# Patient Record
Sex: MALE | Race: BLACK OR AFRICAN AMERICAN | Employment: UNEMPLOYED | URBAN - METROPOLITAN AREA
[De-identification: names, ages, dates, MRNs, and addresses within clinical notes are randomized per-mention and may not be internally consistent; named-entity substitution may affect disease eponyms.]

---

## 2017-11-17 ENCOUNTER — HOSPITAL ENCOUNTER (OUTPATIENT)
Dept: GENERAL RADIOLOGY | Age: 27
Discharge: HOME | End: 2017-11-17
Payer: MEDICARE

## 2017-11-17 ENCOUNTER — HOSPITAL ENCOUNTER (OUTPATIENT)
Dept: PREADMISSION TESTING | Age: 27
Discharge: HOME | End: 2017-11-17
Payer: MEDICARE

## 2017-11-17 VITALS
WEIGHT: 158 LBS | HEIGHT: 68 IN | OXYGEN SATURATION: 98 % | BODY MASS INDEX: 23.95 KG/M2 | SYSTOLIC BLOOD PRESSURE: 146 MMHG | TEMPERATURE: 97.4 F | DIASTOLIC BLOOD PRESSURE: 73 MMHG | RESPIRATION RATE: 16 BRPM | HEART RATE: 58 BPM

## 2017-11-17 DIAGNOSIS — R52 PAIN: ICD-10-CM

## 2017-11-17 LAB
ABSOLUTE EOS #: 0.4 K/UL (ref 0–0.4)
ABSOLUTE IMMATURE GRANULOCYTE: NORMAL K/UL (ref 0–0.3)
ABSOLUTE LYMPH #: 2 K/UL (ref 1–4.8)
ABSOLUTE MONO #: 0.7 K/UL (ref 0.1–1.3)
ANION GAP SERPL CALCULATED.3IONS-SCNC: 10 MMOL/L (ref 9–17)
BASOPHILS # BLD: 1 %
BASOPHILS ABSOLUTE: 0 K/UL (ref 0–0.2)
BUN BLDV-MCNC: 10 MG/DL (ref 6–20)
BUN/CREAT BLD: NORMAL (ref 9–20)
CALCIUM SERPL-MCNC: 9.1 MG/DL (ref 8.6–10.4)
CHLORIDE BLD-SCNC: 106 MMOL/L (ref 98–107)
CO2: 26 MMOL/L (ref 20–31)
CREAT SERPL-MCNC: 0.86 MG/DL (ref 0.7–1.2)
DIFFERENTIAL TYPE: NORMAL
EOSINOPHILS RELATIVE PERCENT: 6 %
GFR AFRICAN AMERICAN: >60 ML/MIN
GFR NON-AFRICAN AMERICAN: >60 ML/MIN
GFR SERPL CREATININE-BSD FRML MDRD: NORMAL ML/MIN/{1.73_M2}
GFR SERPL CREATININE-BSD FRML MDRD: NORMAL ML/MIN/{1.73_M2}
GLUCOSE BLD-MCNC: 86 MG/DL (ref 70–99)
HCT VFR BLD CALC: 45.4 % (ref 41–53)
HEMOGLOBIN: 15.6 G/DL (ref 13.5–17.5)
IMMATURE GRANULOCYTES: NORMAL %
LYMPHOCYTES # BLD: 34 %
MCH RBC QN AUTO: 33.1 PG (ref 26–34)
MCHC RBC AUTO-ENTMCNC: 34.3 G/DL (ref 31–37)
MCV RBC AUTO: 96.7 FL (ref 80–100)
MONOCYTES # BLD: 12 %
PDW BLD-RTO: 13.5 % (ref 11.5–14.9)
PLATELET # BLD: 186 K/UL (ref 150–450)
PLATELET ESTIMATE: NORMAL
PMV BLD AUTO: 10.5 FL (ref 6–12)
POTASSIUM SERPL-SCNC: 4.4 MMOL/L (ref 3.7–5.3)
RBC # BLD: 4.7 M/UL (ref 4.5–5.9)
RBC # BLD: NORMAL 10*6/UL
SEG NEUTROPHILS: 47 %
SEGMENTED NEUTROPHILS ABSOLUTE COUNT: 2.9 K/UL (ref 1.3–9.1)
SODIUM BLD-SCNC: 142 MMOL/L (ref 135–144)
WBC # BLD: 6 K/UL (ref 3.5–11)
WBC # BLD: NORMAL 10*3/UL

## 2017-11-17 PROCEDURE — 36415 COLL VENOUS BLD VENIPUNCTURE: CPT

## 2017-11-17 PROCEDURE — 85025 COMPLETE CBC W/AUTO DIFF WBC: CPT

## 2017-11-17 PROCEDURE — 73620 X-RAY EXAM OF FOOT: CPT

## 2017-11-17 PROCEDURE — 80048 BASIC METABOLIC PNL TOTAL CA: CPT

## 2017-11-17 ASSESSMENT — PAIN DESCRIPTION - LOCATION: LOCATION: FOOT

## 2017-11-17 ASSESSMENT — PAIN DESCRIPTION - ORIENTATION: ORIENTATION: RIGHT

## 2017-11-17 ASSESSMENT — PAIN DESCRIPTION - PAIN TYPE: TYPE: CHRONIC PAIN

## 2017-11-17 NOTE — H&P
HISTORY and [facility]       NAME:  Clemente Morales  MRN: [id number]   YOB: 1990   Date: 11/17/2017   Age: 32 y.o. Gender: male       COMPLAINT AND PRESENT HISTORY:     Clemente Morales is 32 y.o.,  male,undergoing preadmission testing for right Hallux Valgus. Will be undergoing surgery for Right Bunionectomy. The symptoms started 2 years ago. Hx of fx of great toe as a child. Pt C/O of pain, deformity, limitation in the range of motion. Was incarcerated for 6 yrs and wore inadequate shoes and on cement deepti primarily. Complains of irritation of socks to his right foot, pain and numbness to his middle toe. Pt denies any other symptoms. Had a recent wart removed. No redness, swelling or rashes. No recent falls or trauma. PAST MEDICAL HISTORY     Past Medical History:   Diagnosis Date    Diarrhea     Eczema     Poor historian        Pt denies any history of Diabetes mellitus type 2, hypertension, stroke, heart disease, COPD, Asthma, GERD, HLD, Cancer, Seizures,Thyroid disease, Kidney Disease, Hepatitis, TB, Psychiatric Disorders or Substance abuse. SURGICAL HISTORY       Past Surgical History:   Procedure Laterality Date    EYE SURGERY Right     removed part of foreign body       FAMILY HISTORY     History reviewed. No pertinent family history.     SOCIAL HISTORY       Social History     Social History    Marital status: Single     Spouse name: N/A    Number of children: N/A    Years of education: N/A     Social History Main Topics    Smoking status: Current Every Day Smoker     Packs/day: 0.20     Years: 4.00     Types: Cigarettes     Start date: 11/17/2012    Smokeless tobacco: Never Used      Comment: 3-4 cig per day    Alcohol use No    Drug use: No    Sexual activity: Not Asked     Other Topics Concern    None     Social History Narrative    None        REVIEW OF SYSTEMS      No Known Allergies    Current Outpatient Prescriptions on File Prior to Encounter   Medication Sig Dispense Refill    ibuprofen (ADVIL;MOTRIN) 800 MG tablet Take 1 tablet by mouth every 8 hours as needed for Pain 20 tablet 0     No current facility-administered medications on file prior to encounter. General health:  Fairly good. No fever or chills. Skin:  No itching, redness or rash. HEENT:  No headache, epistaxis or sore throat. Visual deficit in right eye              Neck:  No pain, stiffness or masses. Cardiovascular/Respiratory system:  No chest pain, palpitation or shortness of breath. Gastrointestinal tract: No abdominal pain, nausea, vomiting, diarrhea or constipation. Genitourinary:  No burning on micturition. No hesitancy, urgency, frequency or discoloration of urine. Locomotor:  See HPI. Neuropsychiatric:  No referable complaints. GENERAL PHYSICAL EXAM:     Vitals: BP (!) 146/73   Pulse 58   Temp 97.4 °F (36.3 °C)   Resp 16   Ht 5' 8\" (1.727 m)   Wt 158 lb (71.7 kg)   SpO2 98%   BMI 24.02 kg/m²  Body mass index is 24.02 kg/m². GENERAL APPEARANCE:   Roddy Norris is 32 y.o.,  male, not obese, nourished, conscious, alert. Does not appear to be distress or pain at this time. SKIN:  Warm, dry, no cyanosis or jaundice. HEAD:  Normocephalic, atraumatic, no swelling or tenderness. EYES:  Pupils non-equal , reactive to light. Cross -eyed in right. EARS:  No discharge, no marked hearing loss. NOSE:  No rhinorrhea, epistaxis or septal deformity. THROAT:  Not congested. No ulceration bleeding or discharge. NECK:  No stiffness, trachea central.  No palpable masses or L.N.                 CHEST:  Symmetrical and equal on expansion. HEART:  RRR S1 > S2.  No audible murmurs or gallops. LUNGS:  Equal on expansion, normal breath sounds. No adventitious sounds. ABDOMEN:  Obese. Soft on palpation. No localized tenderness. No guarding or rigidity. No palpable organomegaly. LYMPHATICS:  No palpable cervical lymphadenopathy. LOCOMOTOR, BACK AND SPINE:  No tenderness or deformities. Has a limp. Uses a cane to help with ambulation. EXTREMITIES:  Symmetrical, no pedal edema. Steffanys sign negative. No discoloration or ulcerations. NEUROLOGIC:  The patient is conscious, alert, oriented, No apparent focal sensory or motor deficits. PROVISIONAL DIAGNOSES / SURGERY:       WALKER BUNION  FOOT BUNIONECTOMY OLIVER / HAMMERTOE REPAIR ND TOE & MPJ RELEASE 2ND METATARSAL    There are no active problems to display for this patient.           SOM LINK CNP on 11/17/2017 at 2:44 PM

## 2017-11-30 ENCOUNTER — ANESTHESIA EVENT (OUTPATIENT)
Dept: OPERATING ROOM | Age: 27
End: 2017-11-30
Payer: MEDICARE

## 2017-12-01 ENCOUNTER — ANESTHESIA (OUTPATIENT)
Dept: OPERATING ROOM | Age: 27
End: 2017-12-01
Payer: MEDICARE

## 2017-12-01 ENCOUNTER — HOSPITAL ENCOUNTER (OUTPATIENT)
Age: 27
Setting detail: OUTPATIENT SURGERY
Discharge: HOME | End: 2017-12-01
Attending: PODIATRIST | Admitting: PODIATRIST
Payer: MEDICARE

## 2017-12-01 VITALS
TEMPERATURE: 98.1 F | BODY MASS INDEX: 23.95 KG/M2 | HEIGHT: 68 IN | HEART RATE: 80 BPM | DIASTOLIC BLOOD PRESSURE: 73 MMHG | OXYGEN SATURATION: 98 % | WEIGHT: 158 LBS | SYSTOLIC BLOOD PRESSURE: 144 MMHG | RESPIRATION RATE: 16 BRPM

## 2017-12-01 VITALS — SYSTOLIC BLOOD PRESSURE: 86 MMHG | DIASTOLIC BLOOD PRESSURE: 47 MMHG | OXYGEN SATURATION: 100 %

## 2017-12-01 PROBLEM — M20.11 HALLUX ABDUCTOVALGUS WITH BUNIONS, RIGHT: Status: ACTIVE | Noted: 2017-12-01

## 2017-12-01 PROBLEM — M20.41 HAMMERTOE OF SECOND TOE OF RIGHT FOOT: Status: ACTIVE | Noted: 2017-12-01

## 2017-12-01 PROBLEM — M21.611 HALLUX ABDUCTOVALGUS WITH BUNIONS, RIGHT: Status: ACTIVE | Noted: 2017-12-01

## 2017-12-01 PROBLEM — M77.41 METATARSALGIA OF RIGHT FOOT: Status: ACTIVE | Noted: 2017-12-01

## 2017-12-01 PROCEDURE — 3600000012 HC SURGERY LEVEL 2 ADDTL 15MIN: Performed by: PODIATRIST

## 2017-12-01 PROCEDURE — 2500000003 HC RX 250 WO HCPCS: Performed by: PODIATRIST

## 2017-12-01 PROCEDURE — 6360000002 HC RX W HCPCS: Performed by: PODIATRIST

## 2017-12-01 PROCEDURE — 7100000001 HC PACU RECOVERY - ADDTL 15 MIN: Performed by: PODIATRIST

## 2017-12-01 PROCEDURE — 7100000000 HC PACU RECOVERY - FIRST 15 MIN: Performed by: PODIATRIST

## 2017-12-01 PROCEDURE — 2720000010 HC SURG SUPPLY STERILE: Performed by: PODIATRIST

## 2017-12-01 PROCEDURE — 3600000002 HC SURGERY LEVEL 2 BASE: Performed by: PODIATRIST

## 2017-12-01 PROCEDURE — 2580000003 HC RX 258: Performed by: PODIATRIST

## 2017-12-01 PROCEDURE — 3700000000 HC ANESTHESIA ATTENDED CARE: Performed by: PODIATRIST

## 2017-12-01 PROCEDURE — 7100000030 HC ASPR PHASE II RECOVERY - FIRST 15 MIN: Performed by: PODIATRIST

## 2017-12-01 PROCEDURE — C1713 ANCHOR/SCREW BN/BN,TIS/BN: HCPCS | Performed by: PODIATRIST

## 2017-12-01 PROCEDURE — A6446 CONFORM BAND S W>=3" <5"/YD: HCPCS | Performed by: PODIATRIST

## 2017-12-01 PROCEDURE — A6403 STERILE GAUZE>16 <= 48 SQ IN: HCPCS | Performed by: PODIATRIST

## 2017-12-01 PROCEDURE — 6360000002 HC RX W HCPCS: Performed by: NURSE ANESTHETIST, CERTIFIED REGISTERED

## 2017-12-01 PROCEDURE — 7100000031 HC ASPR PHASE II RECOVERY - ADDTL 15 MIN: Performed by: PODIATRIST

## 2017-12-01 PROCEDURE — 2500000003 HC RX 250 WO HCPCS: Performed by: NURSE ANESTHETIST, CERTIFIED REGISTERED

## 2017-12-01 PROCEDURE — 3700000001 HC ADD 15 MINUTES (ANESTHESIA): Performed by: PODIATRIST

## 2017-12-01 PROCEDURE — 2580000003 HC RX 258: Performed by: ANESTHESIOLOGY

## 2017-12-01 DEVICE — K WIRE FIX L6IN DIA0.062IN 1600662] MICROAIRE SURGICAL INSTRUMENTS INC]: Type: IMPLANTABLE DEVICE | Site: FOOT | Status: FUNCTIONAL

## 2017-12-01 DEVICE — K WIRE FIX L6IN DIA0.045IN 1600645] MICROAIRE SURGICAL INSTRUMENTS INC]: Type: IMPLANTABLE DEVICE | Site: SECOND TOE | Status: FUNCTIONAL

## 2017-12-01 RX ORDER — MORPHINE SULFATE 10 MG/ML
2 INJECTION, SOLUTION INTRAMUSCULAR; INTRAVENOUS EVERY 5 MIN PRN
Status: DISCONTINUED | OUTPATIENT
Start: 2017-12-01 | End: 2017-12-01 | Stop reason: HOSPADM

## 2017-12-01 RX ORDER — PROPOFOL 10 MG/ML
INJECTION, EMULSION INTRAVENOUS PRN
Status: DISCONTINUED | OUTPATIENT
Start: 2017-12-01 | End: 2017-12-01 | Stop reason: SDUPTHER

## 2017-12-01 RX ORDER — LIDOCAINE HYDROCHLORIDE 10 MG/ML
INJECTION, SOLUTION EPIDURAL; INFILTRATION; INTRACAUDAL; PERINEURAL PRN
Status: DISCONTINUED | OUTPATIENT
Start: 2017-12-01 | End: 2017-12-01 | Stop reason: HOSPADM

## 2017-12-01 RX ORDER — CEFAZOLIN SODIUM 1 G/3ML
INJECTION, POWDER, FOR SOLUTION INTRAMUSCULAR; INTRAVENOUS
Status: DISCONTINUED
Start: 2017-12-01 | End: 2017-12-01 | Stop reason: HOSPADM

## 2017-12-01 RX ORDER — LIDOCAINE HYDROCHLORIDE 10 MG/ML
INJECTION, SOLUTION EPIDURAL; INFILTRATION; INTRACAUDAL; PERINEURAL PRN
Status: DISCONTINUED | OUTPATIENT
Start: 2017-12-01 | End: 2017-12-01 | Stop reason: SDUPTHER

## 2017-12-01 RX ORDER — ACETAMINOPHEN 325 MG/1
650 TABLET ORAL EVERY 4 HOURS PRN
Status: DISCONTINUED | OUTPATIENT
Start: 2017-12-01 | End: 2017-12-01 | Stop reason: HOSPADM

## 2017-12-01 RX ORDER — MIDAZOLAM HYDROCHLORIDE 1 MG/ML
INJECTION INTRAMUSCULAR; INTRAVENOUS PRN
Status: DISCONTINUED | OUTPATIENT
Start: 2017-12-01 | End: 2017-12-01 | Stop reason: SDUPTHER

## 2017-12-01 RX ORDER — SODIUM CHLORIDE 0.9 % (FLUSH) 0.9 %
10 SYRINGE (ML) INJECTION EVERY 12 HOURS SCHEDULED
Status: DISCONTINUED | OUTPATIENT
Start: 2017-12-01 | End: 2017-12-01 | Stop reason: HOSPADM

## 2017-12-01 RX ORDER — FENTANYL CITRATE 50 UG/ML
INJECTION, SOLUTION INTRAMUSCULAR; INTRAVENOUS PRN
Status: DISCONTINUED | OUTPATIENT
Start: 2017-12-01 | End: 2017-12-01 | Stop reason: SDUPTHER

## 2017-12-01 RX ORDER — KETOROLAC TROMETHAMINE 30 MG/ML
INJECTION, SOLUTION INTRAMUSCULAR; INTRAVENOUS PRN
Status: DISCONTINUED | OUTPATIENT
Start: 2017-12-01 | End: 2017-12-01 | Stop reason: SDUPTHER

## 2017-12-01 RX ORDER — LIDOCAINE HYDROCHLORIDE 10 MG/ML
1 INJECTION, SOLUTION EPIDURAL; INFILTRATION; INTRACAUDAL; PERINEURAL
Status: DISCONTINUED | OUTPATIENT
Start: 2017-12-01 | End: 2017-12-01 | Stop reason: HOSPADM

## 2017-12-01 RX ORDER — ONDANSETRON 2 MG/ML
4 INJECTION INTRAMUSCULAR; INTRAVENOUS
Status: DISCONTINUED | OUTPATIENT
Start: 2017-12-01 | End: 2017-12-01 | Stop reason: HOSPADM

## 2017-12-01 RX ORDER — HYDROMORPHONE HCL 110MG/55ML
0.5 PATIENT CONTROLLED ANALGESIA SYRINGE INTRAVENOUS EVERY 5 MIN PRN
Status: DISCONTINUED | OUTPATIENT
Start: 2017-12-01 | End: 2017-12-01 | Stop reason: HOSPADM

## 2017-12-01 RX ORDER — SODIUM CHLORIDE, SODIUM LACTATE, POTASSIUM CHLORIDE, CALCIUM CHLORIDE 600; 310; 30; 20 MG/100ML; MG/100ML; MG/100ML; MG/100ML
INJECTION, SOLUTION INTRAVENOUS CONTINUOUS
Status: DISCONTINUED | OUTPATIENT
Start: 2017-12-01 | End: 2017-12-01 | Stop reason: HOSPADM

## 2017-12-01 RX ORDER — KETAMINE HYDROCHLORIDE 50 MG/ML
INJECTION, SOLUTION, CONCENTRATE INTRAMUSCULAR; INTRAVENOUS PRN
Status: DISCONTINUED | OUTPATIENT
Start: 2017-12-01 | End: 2017-12-01 | Stop reason: SDUPTHER

## 2017-12-01 RX ORDER — DEXAMETHASONE SODIUM PHOSPHATE 4 MG/ML
INJECTION, SOLUTION INTRA-ARTICULAR; INTRALESIONAL; INTRAMUSCULAR; INTRAVENOUS; SOFT TISSUE PRN
Status: DISCONTINUED | OUTPATIENT
Start: 2017-12-01 | End: 2017-12-01 | Stop reason: SDUPTHER

## 2017-12-01 RX ORDER — SODIUM CHLORIDE 0.9 % (FLUSH) 0.9 %
10 SYRINGE (ML) INJECTION PRN
Status: DISCONTINUED | OUTPATIENT
Start: 2017-12-01 | End: 2017-12-01 | Stop reason: HOSPADM

## 2017-12-01 RX ORDER — OXYCODONE HYDROCHLORIDE AND ACETAMINOPHEN 5; 325 MG/1; MG/1
1 TABLET ORAL EVERY 4 HOURS PRN
Qty: 3 TABLET | Refills: 0 | Status: SHIPPED | OUTPATIENT
Start: 2017-12-01 | End: 2018-11-14

## 2017-12-01 RX ORDER — PROPOFOL 10 MG/ML
INJECTION, EMULSION INTRAVENOUS CONTINUOUS PRN
Status: DISCONTINUED | OUTPATIENT
Start: 2017-12-01 | End: 2017-12-01 | Stop reason: SDUPTHER

## 2017-12-01 RX ORDER — ONDANSETRON 2 MG/ML
INJECTION INTRAMUSCULAR; INTRAVENOUS PRN
Status: DISCONTINUED | OUTPATIENT
Start: 2017-12-01 | End: 2017-12-01 | Stop reason: SDUPTHER

## 2017-12-01 RX ORDER — OXYCODONE HYDROCHLORIDE AND ACETAMINOPHEN 5; 325 MG/1; MG/1
1 TABLET ORAL
Status: DISCONTINUED | OUTPATIENT
Start: 2017-12-01 | End: 2017-12-01 | Stop reason: HOSPADM

## 2017-12-01 RX ORDER — DIPHENHYDRAMINE HYDROCHLORIDE 50 MG/ML
12.5 INJECTION INTRAMUSCULAR; INTRAVENOUS
Status: DISCONTINUED | OUTPATIENT
Start: 2017-12-01 | End: 2017-12-01 | Stop reason: HOSPADM

## 2017-12-01 RX ORDER — CEPHALEXIN 500 MG/1
500 CAPSULE ORAL 2 TIMES DAILY
Qty: 14 CAPSULE | Refills: 0 | Status: SHIPPED | OUTPATIENT
Start: 2017-12-01 | End: 2017-12-08

## 2017-12-01 RX ORDER — BUPIVACAINE HYDROCHLORIDE 5 MG/ML
INJECTION, SOLUTION EPIDURAL; INTRACAUDAL PRN
Status: DISCONTINUED | OUTPATIENT
Start: 2017-12-01 | End: 2017-12-01 | Stop reason: HOSPADM

## 2017-12-01 RX ADMIN — PROPOFOL 20 MG: 10 INJECTION, EMULSION INTRAVENOUS at 08:04

## 2017-12-01 RX ADMIN — HYDROMORPHONE HYDROCHLORIDE 0.5 MG: 1 INJECTION, SOLUTION INTRAMUSCULAR; INTRAVENOUS; SUBCUTANEOUS at 08:55

## 2017-12-01 RX ADMIN — KETOROLAC TROMETHAMINE 30 MG: 30 INJECTION, SOLUTION INTRAMUSCULAR at 09:27

## 2017-12-01 RX ADMIN — CEFAZOLIN 2 G: 1 INJECTION, POWDER, FOR SOLUTION INTRAMUSCULAR; INTRAVENOUS at 07:54

## 2017-12-01 RX ADMIN — PROPOFOL 120 MCG/KG/MIN: 10 INJECTION, EMULSION INTRAVENOUS at 07:56

## 2017-12-01 RX ADMIN — ONDANSETRON 4 MG: 2 INJECTION INTRAMUSCULAR; INTRAVENOUS at 08:33

## 2017-12-01 RX ADMIN — KETAMINE HYDROCHLORIDE 30 MG: 50 INJECTION, SOLUTION INTRAMUSCULAR; INTRAVENOUS at 07:56

## 2017-12-01 RX ADMIN — FENTANYL CITRATE 25 MCG: 50 INJECTION, SOLUTION INTRAMUSCULAR; INTRAVENOUS at 08:10

## 2017-12-01 RX ADMIN — PROPOFOL 100 MG: 10 INJECTION, EMULSION INTRAVENOUS at 08:20

## 2017-12-01 RX ADMIN — DEXAMETHASONE SODIUM PHOSPHATE 8 MG: 4 INJECTION, SOLUTION INTRAMUSCULAR; INTRAVENOUS at 07:56

## 2017-12-01 RX ADMIN — HYDROMORPHONE HYDROCHLORIDE 1 MG: 1 INJECTION, SOLUTION INTRAMUSCULAR; INTRAVENOUS; SUBCUTANEOUS at 08:20

## 2017-12-01 RX ADMIN — PHENYLEPHRINE HYDROCHLORIDE 100 MCG: 10 INJECTION INTRAVENOUS at 09:00

## 2017-12-01 RX ADMIN — LIDOCAINE HYDROCHLORIDE 50 MG: 10 INJECTION, SOLUTION EPIDURAL; INFILTRATION; INTRACAUDAL; PERINEURAL at 07:56

## 2017-12-01 RX ADMIN — SODIUM CHLORIDE, POTASSIUM CHLORIDE, SODIUM LACTATE AND CALCIUM CHLORIDE: 600; 310; 30; 20 INJECTION, SOLUTION INTRAVENOUS at 08:30

## 2017-12-01 RX ADMIN — FENTANYL CITRATE 25 MCG: 50 INJECTION, SOLUTION INTRAMUSCULAR; INTRAVENOUS at 08:05

## 2017-12-01 RX ADMIN — SODIUM CHLORIDE, POTASSIUM CHLORIDE, SODIUM LACTATE AND CALCIUM CHLORIDE: 600; 310; 30; 20 INJECTION, SOLUTION INTRAVENOUS at 07:54

## 2017-12-01 RX ADMIN — FENTANYL CITRATE 50 MCG: 50 INJECTION, SOLUTION INTRAMUSCULAR; INTRAVENOUS at 07:56

## 2017-12-01 RX ADMIN — SODIUM CHLORIDE, POTASSIUM CHLORIDE, SODIUM LACTATE AND CALCIUM CHLORIDE: 600; 310; 30; 20 INJECTION, SOLUTION INTRAVENOUS at 06:38

## 2017-12-01 RX ADMIN — PROPOFOL 20 MG: 10 INJECTION, EMULSION INTRAVENOUS at 08:10

## 2017-12-01 RX ADMIN — MIDAZOLAM 2 MG: 1 INJECTION INTRAMUSCULAR; INTRAVENOUS at 07:54

## 2017-12-01 RX ADMIN — PHENYLEPHRINE HYDROCHLORIDE 100 MCG: 10 INJECTION INTRAVENOUS at 08:35

## 2017-12-01 RX ADMIN — PROPOFOL 20 MG: 10 INJECTION, EMULSION INTRAVENOUS at 08:00

## 2017-12-01 ASSESSMENT — PULMONARY FUNCTION TESTS
PIF_VALUE: 14
PIF_VALUE: 1
PIF_VALUE: 14
PIF_VALUE: 3
PIF_VALUE: 1
PIF_VALUE: 11
PIF_VALUE: 3
PIF_VALUE: 2
PIF_VALUE: 1
PIF_VALUE: 11
PIF_VALUE: 3
PIF_VALUE: 2
PIF_VALUE: 1
PIF_VALUE: 14
PIF_VALUE: 3
PIF_VALUE: 2
PIF_VALUE: 1
PIF_VALUE: 14
PIF_VALUE: 3
PIF_VALUE: 0
PIF_VALUE: 3
PIF_VALUE: 3
PIF_VALUE: 11
PIF_VALUE: 14
PIF_VALUE: 1
PIF_VALUE: 3
PIF_VALUE: 1
PIF_VALUE: 0
PIF_VALUE: 16
PIF_VALUE: 1
PIF_VALUE: 3
PIF_VALUE: 1
PIF_VALUE: 3
PIF_VALUE: 1
PIF_VALUE: 11
PIF_VALUE: 3
PIF_VALUE: 3
PIF_VALUE: 1
PIF_VALUE: 14
PIF_VALUE: 3
PIF_VALUE: 14
PIF_VALUE: 3
PIF_VALUE: 1
PIF_VALUE: 2
PIF_VALUE: 4
PIF_VALUE: 1
PIF_VALUE: 2
PIF_VALUE: 11
PIF_VALUE: 1
PIF_VALUE: 3
PIF_VALUE: 16
PIF_VALUE: 1
PIF_VALUE: 3
PIF_VALUE: 14
PIF_VALUE: 1
PIF_VALUE: 1
PIF_VALUE: 14
PIF_VALUE: 1
PIF_VALUE: 11
PIF_VALUE: 1
PIF_VALUE: 14
PIF_VALUE: 1
PIF_VALUE: 3
PIF_VALUE: 13
PIF_VALUE: 14
PIF_VALUE: 3
PIF_VALUE: 1
PIF_VALUE: 3
PIF_VALUE: 11
PIF_VALUE: 3
PIF_VALUE: 13
PIF_VALUE: 14
PIF_VALUE: 1
PIF_VALUE: 3
PIF_VALUE: 16
PIF_VALUE: 1
PIF_VALUE: 3
PIF_VALUE: 11
PIF_VALUE: 3
PIF_VALUE: 1
PIF_VALUE: 3
PIF_VALUE: 1
PIF_VALUE: 14
PIF_VALUE: 3
PIF_VALUE: 1
PIF_VALUE: 3
PIF_VALUE: 3
PIF_VALUE: 2

## 2017-12-01 ASSESSMENT — PAIN DESCRIPTION - PAIN TYPE: TYPE: REFERRED PAIN

## 2017-12-01 ASSESSMENT — PAIN SCALES - GENERAL
PAINLEVEL_OUTOF10: 0
PAINLEVEL_OUTOF10: 0

## 2017-12-01 ASSESSMENT — LIFESTYLE VARIABLES: SMOKING_STATUS: 1

## 2017-12-01 ASSESSMENT — PAIN - FUNCTIONAL ASSESSMENT: PAIN_FUNCTIONAL_ASSESSMENT: 0-10

## 2017-12-01 NOTE — H&P
HISTORY and [facility]       NAME:  Ahmet Mann  MRN: [id number]   YOB: 1990   Date: 12/1/2017   Age: 32 y.o. Gender: male     H&P Update Note    H&P from 11/17/2017  reviewed and updated, Patient examined. Vitals: /70   Pulse 85   Temp 97.3 °F (36.3 °C) (Oral)   Resp 17   Ht 5' 8\" (1.727 m)   Wt 158 lb (71.7 kg)   SpO2 100%   BMI 24.02 kg/m²  Body mass index is 24.02 kg/m². No interval changes. Pt had F.B. Rt eye, with subsequent surg, Pt has irregular pupil with a divergent squint and visual loss. I concur with the findings.     YUNIEL TOTH PA-C on 12/1/2017 at 7:17 AM

## 2017-12-01 NOTE — FLOWSHEET NOTE
12/01/17 0749   Encounter Summary   Services provided to: Patient and family together   Referral/Consult From: [facility] Family members   Continue Visiting (12/1/17)   Complexity of Encounter Moderate   Length of Encounter 30 minutes   Spiritual Assessment Completed Yes   Routine   Type Pre-procedure   Assessment Coping; Approachable; Anxious; Hopeful   Intervention Discussed relationship with God;Discussed belief system/Episcopal practices/va; Active listening;Explored feelings, thoughts, concerns; Discussed meaning/purpose   Outcome Engaged in conversation; Shared life review; Shared reminiscences; Expressed gratitude;Comfort   Spiritual/Jew   Type Spiritual support

## 2017-12-01 NOTE — ANESTHESIA PRE PROCEDURE
Department of Anesthesiology  Preprocedure Note       Name:  Lydia May   Age:  32 y.o.  :  1990                                          MRN:  [id number]         Date:  2017      Surgeon: Rosendo Sherwood):  René Duncan DPM    Procedure: Procedure(s):  FOOT BUNIONECTOMY OLIVER / HAMMERTOE REPAIR ND TOE & MPJ RELEASE 2ND METATARSAL    Medications prior to admission:   Prior to Admission medications    Medication Sig Start Date End Date Taking? Authorizing Provider   ibuprofen (ADVIL;MOTRIN) 800 MG tablet Take 1 tablet by mouth every 8 hours as needed for Pain 17  Yes Aide Haines PA-C       Current medications:    Current Facility-Administered Medications   Medication Dose Route Frequency Provider Last Rate Last Dose    lactated ringers infusion   Intravenous Continuous Prachi Hinojosa  mL/hr at 17 9790      sodium chloride flush 0.9 % injection 10 mL  10 mL Intravenous 2 times per day Prachi Hinojosa MD        sodium chloride flush 0.9 % injection 10 mL  10 mL Intravenous PRN Prachi Hinojosa MD        lidocaine PF 1 % injection 1 mL  1 mL Intradermal Once PRN Prachi Hinojosa MD        ceFAZolin (ANCEF) 2 g in sterile water 20 mL IV syringe  2 g Intravenous Once René Duncan DPM        ceFAZolin (ANCEF) 1 g injection                Allergies:  No Known Allergies    Problem List:  There is no problem list on file for this patient.       Past Medical History:        Diagnosis Date    Diarrhea     Eczema     Poor historian        Past Surgical History:        Procedure Laterality Date    EYE SURGERY Right     removed part of foreign body       Social History:    Social History   Substance Use Topics    Smoking status: Current Every Day Smoker     Packs/day: 0.20     Years: 4.00     Types: Cigarettes     Start date: 2012    Smokeless tobacco: Never Used      Comment: 3-4 cig per day    Alcohol use No                                Ready to quit: Not Answered  Counseling given: ROS        (-) murmur,  friction rub, carotid bruit and peripheral edema                Neuro/Psych:   Negative Neuro/Psych ROS              GI/Hepatic/Renal: Neg GI/Hepatic/Renal ROS            Endo/Other: Negative Endo/Other ROS                    Abdominal:           Vascular: negative vascular ROS. Anesthesia Plan      general and MAC     ASA 2       Induction: intravenous. MIPS: Postoperative opioids intended and Prophylactic antiemetics administered. Anesthetic plan and risks discussed with patient. Plan discussed with CRNA.                   Carrie Arizmendi MD   12/1/2017

## 2017-12-01 NOTE — OP NOTE
Operative Report  12/1/2017  Rohan Barone  32 y.o. Noahjuan antoniored      Assistants:none    Pre-operative Diagnosis:Hallux Abducto valgus right Hammertoe  2nd right Metatarsalgia 2nd metatarsal right foot    Post-operative Diagnosis:same    Procedure: Ambrosio bunionectomy right foot with K wire fixation. Arthroplasty 2nd toe right foot. MPJ release 2nd metatarsal right foot    Anesthesia:General LMA anesthesia    Hemostasis:Ankle tourniquet at 250    Estimated Blood Loss: minimal    Materials:K wire    Injectables:50:50 mixture of Xylocaine 2% plain and .5% marcaine    []  Marcaine: hexadrol /decadron    Pathology/Microbiology:[]no specimen sent    Complications    Indications: Patient is a 32 y.o. male with a chief complaint of pain right foot who is being seen by Dr. Sachin Singh and being treated for 2 years. At this time all conservative options have been exhausted and surgical intervention is necessary. The procedure has been explained to the patient and they understand the risks, benefits and possible complications including pain, recurrence, swelling, RSDS. No promises have been made as to surgical outcome. Procedure: The patient was transported from the pre-op holding to the operating room and placed in a supine position. A pneumatic ankle tourniquet was applied to the right ankle. A pre-operative injection of 16cc of xylocaine 1% plain and .5% marcaine was injected into the right foot in a H block. The right foot was then prepped and draped in the normal aspetic manner. The right foot was then exsanguinated with an esmark and the tourniquet was inflated to 250 mmHg. Attention was then directed to the dorsal aspect of the  1st metatarsal-phalangeal joint of the right The full thickness incision was made along the dorsal-medial aspect of the right 1st MPJ approximately 4cm longitudinally.  Blunt dissection was carried down to the level of metatarsal-phalangeal capsule, making sure to carefully retract skin edges, avoid vital neurovascular structures and cauterize any/all small bleeding vessels. Blunt dissection was used to visualize the lateral soft tissue attachments of the 1st MPJ. A scalpel was used to release the Adductor Hallucis insertion, Fibers of the deep transverse Metatarsal ligament and the fibular suspensory ligament. A longitudinal capsular incision was then utilized on the medial aspect of the 1st MPJ. Sagittal saw was used to remove the medial eminence. A 0.045 k-wire was driven medial to lateral as a point of reference for the metatarsal parabola of the lesser metatarsals. A 60 degree angle on the medial aspect of the metatarsal head. An Donata Braulio Osteotomy was then performed, making a chevron cut in the 1st metatarsal head. The capital fragment was then transposed laterally, approximately 4mm. The capital fragment was held in place with temporary fixation consisting of a 0.045 kwire. A second  kwire driven Distal plantar latera to proximal dorsal medial. The remaining medial  bone shell is removed with saggital saw and  a rasp is used to smooth the site. The incision was flushed with copious amounts of sterile saline. The subcutaneous tissues were re-appoximated with 3.0 vicryl. The skin was closed using monocryl 4.0. K wire was then bent and cut. A post-op injection of hexadrol and marcaine. The incision was dressed with adaptic, 4x4's, kerlix, etc.     PROCEDURE #2- ARTHROPLASTY PIPJ SECOND DIGIT WITH K WIRE STABILIZATION RIGHT FOOT:   A 3 cm linear longitudinal incision was created over the second digit centered over the PIPJ. The incision was deepened through the subcutaneous tissues with care being taken to identify and retract all vital neurovascular structures. All bleeders were cauterized as necessary. A transverse tenotomy capsulotomy was performed to the proximal intraphalangeal joint of the second digit right/left foot.  The head of the proximal phalanx was then freed of

## 2018-10-28 ENCOUNTER — HOSPITAL ENCOUNTER (EMERGENCY)
Age: 28
Discharge: HOME | End: 2018-10-28
Attending: EMERGENCY MEDICINE
Payer: MEDICARE

## 2018-10-28 VITALS
HEART RATE: 110 BPM | RESPIRATION RATE: 18 BRPM | BODY MASS INDEX: 25.17 KG/M2 | OXYGEN SATURATION: 100 % | HEIGHT: 68 IN | WEIGHT: 166.06 LBS | SYSTOLIC BLOOD PRESSURE: 143 MMHG | DIASTOLIC BLOOD PRESSURE: 78 MMHG | TEMPERATURE: 98 F

## 2018-10-28 DIAGNOSIS — R10.13 EPIGASTRIC PAIN: Primary | ICD-10-CM

## 2018-10-28 LAB
ABSOLUTE EOS #: 0.2 K/UL (ref 0–0.4)
ABSOLUTE IMMATURE GRANULOCYTE: ABNORMAL K/UL (ref 0–0.3)
ABSOLUTE LYMPH #: 1.6 K/UL (ref 1–4.8)
ABSOLUTE MONO #: 0.8 K/UL (ref 0.2–0.8)
ALBUMIN SERPL-MCNC: 4.1 G/DL (ref 3.5–5.2)
ALBUMIN/GLOBULIN RATIO: ABNORMAL (ref 1–2.5)
ALP BLD-CCNC: 72 U/L (ref 40–129)
ALT SERPL-CCNC: 25 U/L (ref 5–41)
AMYLASE: 119 U/L (ref 28–100)
ANION GAP SERPL CALCULATED.3IONS-SCNC: 11 MMOL/L (ref 9–17)
AST SERPL-CCNC: 27 U/L
BASOPHILS # BLD: 0 % (ref 0–2)
BASOPHILS ABSOLUTE: 0 K/UL (ref 0–0.2)
BILIRUB SERPL-MCNC: 0.23 MG/DL (ref 0.3–1.2)
BILIRUBIN DIRECT: <0.08 MG/DL
BILIRUBIN, INDIRECT: ABNORMAL MG/DL (ref 0–1)
BUN BLDV-MCNC: 11 MG/DL (ref 6–20)
BUN/CREAT BLD: 14 (ref 9–20)
CALCIUM SERPL-MCNC: 9 MG/DL (ref 8.6–10.4)
CHLORIDE BLD-SCNC: 109 MMOL/L (ref 98–107)
CO2: 24 MMOL/L (ref 20–31)
CREAT SERPL-MCNC: 0.79 MG/DL (ref 0.7–1.2)
DIFFERENTIAL TYPE: ABNORMAL
EOSINOPHILS RELATIVE PERCENT: 5 % (ref 1–4)
GFR AFRICAN AMERICAN: >60 ML/MIN
GFR NON-AFRICAN AMERICAN: >60 ML/MIN
GFR SERPL CREATININE-BSD FRML MDRD: ABNORMAL ML/MIN/{1.73_M2}
GFR SERPL CREATININE-BSD FRML MDRD: ABNORMAL ML/MIN/{1.73_M2}
GLOBULIN: ABNORMAL G/DL (ref 1.5–3.8)
GLUCOSE BLD-MCNC: 93 MG/DL (ref 70–99)
HCT VFR BLD CALC: 42.3 % (ref 41–53)
HEMOGLOBIN: 14.3 G/DL (ref 13.5–17.5)
IMMATURE GRANULOCYTES: ABNORMAL %
LIPASE: 37 U/L (ref 13–60)
LYMPHOCYTES # BLD: 36 % (ref 24–44)
MCH RBC QN AUTO: 33.1 PG (ref 26–34)
MCHC RBC AUTO-ENTMCNC: 33.8 G/DL (ref 31–37)
MCV RBC AUTO: 97.8 FL (ref 80–100)
MONOCYTES # BLD: 18 % (ref 1–7)
NRBC AUTOMATED: ABNORMAL PER 100 WBC
PDW BLD-RTO: 14 % (ref 11.5–14.5)
PLATELET # BLD: 161 K/UL (ref 130–400)
PLATELET ESTIMATE: ABNORMAL
PMV BLD AUTO: 10.7 FL (ref 6–12)
POTASSIUM SERPL-SCNC: 4 MMOL/L (ref 3.7–5.3)
RBC # BLD: 4.33 M/UL (ref 4.5–5.9)
RBC # BLD: ABNORMAL 10*6/UL
SEG NEUTROPHILS: 41 % (ref 36–66)
SEGMENTED NEUTROPHILS ABSOLUTE COUNT: 1.9 K/UL (ref 1.8–7.7)
SODIUM BLD-SCNC: 144 MMOL/L (ref 135–144)
TOTAL PROTEIN: 6.5 G/DL (ref 6.4–8.3)
WBC # BLD: 4.6 K/UL (ref 3.5–11)
WBC # BLD: ABNORMAL 10*3/UL

## 2018-10-28 PROCEDURE — 83690 ASSAY OF LIPASE: CPT

## 2018-10-28 PROCEDURE — 80048 BASIC METABOLIC PNL TOTAL CA: CPT

## 2018-10-28 PROCEDURE — 6370000000 HC RX 637 (ALT 250 FOR IP): Performed by: NURSE PRACTITIONER

## 2018-10-28 PROCEDURE — 85025 COMPLETE CBC W/AUTO DIFF WBC: CPT

## 2018-10-28 PROCEDURE — 99283 EMERGENCY DEPT VISIT LOW MDM: CPT

## 2018-10-28 PROCEDURE — 82150 ASSAY OF AMYLASE: CPT

## 2018-10-28 PROCEDURE — 80076 HEPATIC FUNCTION PANEL: CPT

## 2018-10-28 RX ORDER — OMEPRAZOLE 40 MG/1
40 CAPSULE, DELAYED RELEASE ORAL DAILY
Qty: 30 CAPSULE | Refills: 0 | Status: SHIPPED | OUTPATIENT
Start: 2018-10-28 | End: 2018-11-14

## 2018-10-28 RX ORDER — DICYCLOMINE HYDROCHLORIDE 10 MG/1
10 CAPSULE ORAL EVERY 6 HOURS PRN
Qty: 20 CAPSULE | Refills: 0 | Status: SHIPPED | OUTPATIENT
Start: 2018-10-28 | End: 2018-11-14

## 2018-10-28 RX ADMIN — LIDOCAINE HYDROCHLORIDE: 20 SOLUTION ORAL; TOPICAL at 11:09

## 2018-10-28 ASSESSMENT — ENCOUNTER SYMPTOMS
SHORTNESS OF BREATH: 0
COUGH: 0
SORE THROAT: 0
VOMITING: 0
ABDOMINAL PAIN: 1
SINUS PRESSURE: 0
NAUSEA: 0
CONSTIPATION: 0
RHINORRHEA: 0
COLOR CHANGE: 0
DIARRHEA: 0
WHEEZING: 0

## 2018-10-28 ASSESSMENT — PAIN DESCRIPTION - PAIN TYPE: TYPE: ACUTE PAIN

## 2018-10-28 ASSESSMENT — PAIN SCALES - GENERAL
PAINLEVEL_OUTOF10: 7
PAINLEVEL_OUTOF10: 6

## 2018-10-28 ASSESSMENT — PAIN DESCRIPTION - DESCRIPTORS: DESCRIPTORS: ACHING

## 2018-10-28 NOTE — ED PROVIDER NOTES
[address] [place] [address] ED  eMERGENCY dEPARTMENT eNCOUnter      Pt Name: Albina Dubin  MRN: [id number]  Carissagfsusan 1990  Date of evaluation: 10/28/2018  Provider: Dolores Martell NP, RODNEY - [facility]       Chief Complaint   Patient presents with    Abdominal Pain    Verruca Vulgaris         HISTORY OF PRESENT ILLNESS  (Location/Symptom, Timing/Onset, Context/Setting, Quality, Duration, Modifying Factors, Severity.)   Albina Dubin is a 25 y.o. male who presents to the emergency department today by private automobile for evaluation of abdominal pain. The patient states that he has had this epigastric abdominal pain for the last 3 years. He states that it has gotten progressively worse since Friday. He denies any nausea or vomiting. He denies any ETOH abuse. Nursing Notes were reviewed. ALLERGIES     Patient has no known allergies. CURRENT MEDICATIONS       Discharge Medication List as of 10/28/2018 12:25 PM      CONTINUE these medications which have NOT CHANGED    Details   ibuprofen (ADVIL;MOTRIN) 800 MG tablet Take 1 tablet by mouth every 8 hours as needed for Pain, Disp-20 tablet, R-0      oxyCODONE-acetaminophen (PERCOCET) 5-325 MG per tablet Take 1 tablet by mouth every 4 hours as needed for Pain ., Disp-3 tablet, R-0Print             PAST MEDICAL HISTORY         Diagnosis Date    Diarrhea     Eczema     Poor historian        SURGICAL HISTORY           Procedure Laterality Date    BUNIONECTOMY Right 12/1/2017    FOOT BUNIONECTOMY OLIVER / HAMMERTOE REPAIR 2ND TOE & MPJ RELEASE 2ND METATARSAL performed by Jeremy Lopez DPM at [address] Right     removed part of foreign body         FAMILY HISTORY     History reviewed. No pertinent family history. No family status information on file. SOCIAL HISTORY      reports that he has been smoking Cigarettes. He started smoking about 5 years ago. He has a 0.80 pack-year smoking history.  He has never used smokeless tobacco.

## 2018-11-14 ENCOUNTER — OFFICE VISIT (OUTPATIENT)
Dept: FAMILY MEDICINE CLINIC | Age: 28
End: 2018-11-14
Payer: MEDICARE

## 2018-11-14 VITALS
OXYGEN SATURATION: 99 % | DIASTOLIC BLOOD PRESSURE: 83 MMHG | HEIGHT: 68 IN | HEART RATE: 71 BPM | WEIGHT: 160.8 LBS | SYSTOLIC BLOOD PRESSURE: 132 MMHG | BODY MASS INDEX: 24.37 KG/M2

## 2018-11-14 DIAGNOSIS — Z13.220 SCREENING, LIPID: ICD-10-CM

## 2018-11-14 DIAGNOSIS — E55.9 VITAMIN D DEFICIENCY: ICD-10-CM

## 2018-11-14 DIAGNOSIS — B07.0 PLANTAR WART OF BOTH FEET: ICD-10-CM

## 2018-11-14 DIAGNOSIS — B35.1 ONYCHOMYCOSIS: ICD-10-CM

## 2018-11-14 DIAGNOSIS — K21.9 GASTROESOPHAGEAL REFLUX DISEASE, ESOPHAGITIS PRESENCE NOT SPECIFIED: ICD-10-CM

## 2018-11-14 DIAGNOSIS — R73.9 HYPERGLYCEMIA: ICD-10-CM

## 2018-11-14 DIAGNOSIS — F41.1 GAD (GENERALIZED ANXIETY DISORDER): ICD-10-CM

## 2018-11-14 DIAGNOSIS — M21.961 FOOT DEFORMITY, BILATERAL: ICD-10-CM

## 2018-11-14 DIAGNOSIS — M21.619 BUNION OF UNSPECIFIED FOOT: ICD-10-CM

## 2018-11-14 DIAGNOSIS — R19.7 DIARRHEA, UNSPECIFIED TYPE: ICD-10-CM

## 2018-11-14 DIAGNOSIS — Z11.4 SCREENING FOR HIV (HUMAN IMMUNODEFICIENCY VIRUS): ICD-10-CM

## 2018-11-14 DIAGNOSIS — R10.10 PAIN OF UPPER ABDOMEN: Primary | ICD-10-CM

## 2018-11-14 DIAGNOSIS — M21.962 FOOT DEFORMITY, BILATERAL: ICD-10-CM

## 2018-11-14 DIAGNOSIS — E53.9 VITAMIN B DEFICIENCY: ICD-10-CM

## 2018-11-14 DIAGNOSIS — B07.8 PALMAR WART: ICD-10-CM

## 2018-11-14 DIAGNOSIS — Z13.29 SCREENING FOR THYROID DISORDER: ICD-10-CM

## 2018-11-14 PROCEDURE — 96160 PT-FOCUSED HLTH RISK ASSMT: CPT | Performed by: FAMILY MEDICINE

## 2018-11-14 PROCEDURE — 4004F PT TOBACCO SCREEN RCVD TLK: CPT | Performed by: FAMILY MEDICINE

## 2018-11-14 PROCEDURE — G8427 DOCREV CUR MEDS BY ELIG CLIN: HCPCS | Performed by: FAMILY MEDICINE

## 2018-11-14 PROCEDURE — G8484 FLU IMMUNIZE NO ADMIN: HCPCS | Performed by: FAMILY MEDICINE

## 2018-11-14 PROCEDURE — G8420 CALC BMI NORM PARAMETERS: HCPCS | Performed by: FAMILY MEDICINE

## 2018-11-14 PROCEDURE — 99204 OFFICE O/P NEW MOD 45 MIN: CPT | Performed by: FAMILY MEDICINE

## 2018-11-14 RX ORDER — BUSPIRONE HYDROCHLORIDE 10 MG/1
10 TABLET ORAL 3 TIMES DAILY
Qty: 90 TABLET | Refills: 0 | Status: SHIPPED | OUTPATIENT
Start: 2018-11-14 | End: 2018-12-14

## 2018-11-14 RX ORDER — PAROXETINE HYDROCHLORIDE 20 MG/1
20 TABLET, FILM COATED ORAL DAILY
Qty: 30 TABLET | Refills: 3 | Status: SHIPPED | OUTPATIENT
Start: 2018-11-14 | End: 2019-05-14

## 2018-11-14 RX ORDER — PANTOPRAZOLE SODIUM 40 MG/1
40 TABLET, DELAYED RELEASE ORAL DAILY
Qty: 30 TABLET | Refills: 3 | Status: SHIPPED | OUTPATIENT
Start: 2018-11-14 | End: 2019-05-14

## 2018-11-14 ASSESSMENT — ENCOUNTER SYMPTOMS
VOICE CHANGE: 0
VOMITING: 0
DIARRHEA: 1
BACK PAIN: 0
CHEST TIGHTNESS: 0
ABDOMINAL PAIN: 1
EYE PAIN: 0
CONSTIPATION: 0
SORE THROAT: 0
NAUSEA: 0
EYE DISCHARGE: 0
RHINORRHEA: 0
SINUS PRESSURE: 0
COLOR CHANGE: 0
ABDOMINAL DISTENTION: 1
BLOOD IN STOOL: 0
SHORTNESS OF BREATH: 0

## 2018-11-14 ASSESSMENT — PATIENT HEALTH QUESTIONNAIRE - PHQ9
SUM OF ALL RESPONSES TO PHQ QUESTIONS 1-9: 6
4. FEELING TIRED OR HAVING LITTLE ENERGY: 0
6. FEELING BAD ABOUT YOURSELF - OR THAT YOU ARE A FAILURE OR HAVE LET YOURSELF OR YOUR FAMILY DOWN: 1
SUM OF ALL RESPONSES TO PHQ QUESTIONS 1-9: 1
2. FEELING DOWN, DEPRESSED OR HOPELESS: 0
SUM OF ALL RESPONSES TO PHQ QUESTIONS 1-9: 1
SUM OF ALL RESPONSES TO PHQ9 QUESTIONS 1 & 2: 0
5. POOR APPETITE OR OVEREATING: 0
1. LITTLE INTEREST OR PLEASURE IN DOING THINGS: 3
8. MOVING OR SPEAKING SO SLOWLY THAT OTHER PEOPLE COULD HAVE NOTICED. OR THE OPPOSITE, BEING SO FIGETY OR RESTLESS THAT YOU HAVE BEEN MOVING AROUND A LOT MORE THAN USUAL: 0
2. FEELING DOWN, DEPRESSED OR HOPELESS: 3
9. THOUGHTS THAT YOU WOULD BE BETTER OFF DEAD, OR OF HURTING YOURSELF: 0
SUM OF ALL RESPONSES TO PHQ9 QUESTIONS 1 & 2: 6
7. TROUBLE CONCENTRATING ON THINGS, SUCH AS READING THE NEWSPAPER OR WATCHING TELEVISION: 0
1. LITTLE INTEREST OR PLEASURE IN DOING THINGS: 0
SUM OF ALL RESPONSES TO PHQ QUESTIONS 1-9: 6
10. IF YOU CHECKED OFF ANY PROBLEMS, HOW DIFFICULT HAVE THESE PROBLEMS MADE IT FOR YOU TO DO YOUR WORK, TAKE CARE OF THINGS AT HOME, OR GET ALONG WITH OTHER PEOPLE: 0
3. TROUBLE FALLING OR STAYING ASLEEP: 0

## 2019-01-09 ENCOUNTER — OFFICE VISIT (OUTPATIENT)
Dept: PODIATRY | Age: 29
End: 2019-01-09
Payer: MEDICARE

## 2019-01-09 VITALS — WEIGHT: 160 LBS | BODY MASS INDEX: 24.25 KG/M2 | HEIGHT: 68 IN

## 2019-01-09 DIAGNOSIS — B07.0 PLANTAR WART, RIGHT FOOT: Primary | ICD-10-CM

## 2019-01-09 DIAGNOSIS — M79.604 PAIN OF RIGHT LOWER EXTREMITY: ICD-10-CM

## 2019-01-09 PROCEDURE — 4004F PT TOBACCO SCREEN RCVD TLK: CPT | Performed by: PODIATRIST

## 2019-01-09 PROCEDURE — 17110 DESTRUCTION B9 LES UP TO 14: CPT | Performed by: PODIATRIST

## 2019-01-09 PROCEDURE — G8427 DOCREV CUR MEDS BY ELIG CLIN: HCPCS | Performed by: PODIATRIST

## 2019-01-09 PROCEDURE — 99203 OFFICE O/P NEW LOW 30 MIN: CPT | Performed by: PODIATRIST

## 2019-01-09 PROCEDURE — G8484 FLU IMMUNIZE NO ADMIN: HCPCS | Performed by: PODIATRIST

## 2019-01-09 PROCEDURE — G8420 CALC BMI NORM PARAMETERS: HCPCS | Performed by: PODIATRIST

## 2019-01-23 ENCOUNTER — OFFICE VISIT (OUTPATIENT)
Dept: PODIATRY | Age: 29
End: 2019-01-23
Payer: MEDICARE

## 2019-01-23 VITALS — WEIGHT: 160 LBS | BODY MASS INDEX: 24.25 KG/M2 | HEIGHT: 68 IN | RESPIRATION RATE: 16 BRPM

## 2019-01-23 DIAGNOSIS — M79.671 BILATERAL FOOT PAIN: Primary | ICD-10-CM

## 2019-01-23 DIAGNOSIS — M79.672 BILATERAL FOOT PAIN: Primary | ICD-10-CM

## 2019-01-23 DIAGNOSIS — D23.71 BENIGN NEOPLASM OF SKIN OF RIGHT FOOT: ICD-10-CM

## 2019-01-23 PROCEDURE — 99213 OFFICE O/P EST LOW 20 MIN: CPT | Performed by: PODIATRIST

## 2019-01-23 PROCEDURE — G8484 FLU IMMUNIZE NO ADMIN: HCPCS | Performed by: PODIATRIST

## 2019-01-23 PROCEDURE — 4004F PT TOBACCO SCREEN RCVD TLK: CPT | Performed by: PODIATRIST

## 2019-01-23 PROCEDURE — 17110 DESTRUCTION B9 LES UP TO 14: CPT | Performed by: PODIATRIST

## 2019-01-23 PROCEDURE — G8420 CALC BMI NORM PARAMETERS: HCPCS | Performed by: PODIATRIST

## 2019-01-23 PROCEDURE — G8427 DOCREV CUR MEDS BY ELIG CLIN: HCPCS | Performed by: PODIATRIST

## 2019-01-24 ENCOUNTER — HOSPITAL ENCOUNTER (OUTPATIENT)
Age: 29
Discharge: HOME | End: 2019-01-24
Payer: MEDICARE

## 2019-01-24 DIAGNOSIS — R10.10 PAIN OF UPPER ABDOMEN: ICD-10-CM

## 2019-01-24 DIAGNOSIS — Z11.4 SCREENING FOR HIV (HUMAN IMMUNODEFICIENCY VIRUS): ICD-10-CM

## 2019-01-24 DIAGNOSIS — R73.9 HYPERGLYCEMIA: ICD-10-CM

## 2019-01-24 DIAGNOSIS — E55.9 VITAMIN D DEFICIENCY: ICD-10-CM

## 2019-01-24 DIAGNOSIS — E53.9 VITAMIN B DEFICIENCY: ICD-10-CM

## 2019-01-24 DIAGNOSIS — Z13.220 SCREENING, LIPID: ICD-10-CM

## 2019-01-24 DIAGNOSIS — Z13.29 SCREENING FOR THYROID DISORDER: ICD-10-CM

## 2019-01-24 LAB
ALBUMIN SERPL-MCNC: 4.5 G/DL (ref 3.5–5.2)
ALBUMIN/GLOBULIN RATIO: 1.9 (ref 1–2.5)
ALP BLD-CCNC: 93 U/L (ref 40–129)
ALT SERPL-CCNC: 24 U/L (ref 5–41)
ANION GAP SERPL CALCULATED.3IONS-SCNC: 14 MMOL/L (ref 9–17)
AST SERPL-CCNC: 24 U/L
BILIRUB SERPL-MCNC: 0.38 MG/DL (ref 0.3–1.2)
BUN BLDV-MCNC: 16 MG/DL (ref 6–20)
BUN/CREAT BLD: ABNORMAL (ref 9–20)
CALCIUM SERPL-MCNC: 9.2 MG/DL (ref 8.6–10.4)
CHLORIDE BLD-SCNC: 108 MMOL/L (ref 98–107)
CHOLESTEROL, FASTING: 127 MG/DL
CHOLESTEROL/HDL RATIO: 2
CO2: 20 MMOL/L (ref 20–31)
CREAT SERPL-MCNC: 0.94 MG/DL (ref 0.7–1.2)
ESTIMATED AVERAGE GLUCOSE: 100 MG/DL
FOLATE: 12.2 NG/ML
GFR AFRICAN AMERICAN: >60 ML/MIN
GFR NON-AFRICAN AMERICAN: >60 ML/MIN
GFR SERPL CREATININE-BSD FRML MDRD: ABNORMAL ML/MIN/{1.73_M2}
GFR SERPL CREATININE-BSD FRML MDRD: ABNORMAL ML/MIN/{1.73_M2}
GLUCOSE BLD-MCNC: 93 MG/DL (ref 70–99)
HBA1C MFR BLD: 5.1 % (ref 4–6)
HCT VFR BLD CALC: 46.6 % (ref 40.7–50.3)
HDLC SERPL-MCNC: 63 MG/DL
HEMOGLOBIN: 15.7 G/DL (ref 13–17)
HIV AG/AB: NONREACTIVE
IRON SATURATION: 31 % (ref 20–55)
IRON: 99 UG/DL (ref 59–158)
LDL CHOLESTEROL: 57 MG/DL (ref 0–130)
MCH RBC QN AUTO: 32.7 PG (ref 25.2–33.5)
MCHC RBC AUTO-ENTMCNC: 33.7 G/DL (ref 28.4–34.8)
MCV RBC AUTO: 97.1 FL (ref 82.6–102.9)
NRBC AUTOMATED: 0 PER 100 WBC
PDW BLD-RTO: 13.5 % (ref 11.8–14.4)
PLATELET # BLD: 183 K/UL (ref 138–453)
PMV BLD AUTO: 12.5 FL (ref 8.1–13.5)
POTASSIUM SERPL-SCNC: 4.2 MMOL/L (ref 3.7–5.3)
RBC # BLD: 4.8 M/UL (ref 4.21–5.77)
SODIUM BLD-SCNC: 142 MMOL/L (ref 135–144)
T3 FREE: 2.95 PG/ML (ref 2.02–4.43)
THYROXINE, FREE: 1.06 NG/DL (ref 0.93–1.7)
TOTAL IRON BINDING CAPACITY: 316 UG/DL (ref 250–450)
TOTAL PROTEIN: 6.9 G/DL (ref 6.4–8.3)
TRIGLYCERIDE, FASTING: 34 MG/DL
TSH SERPL DL<=0.05 MIU/L-ACNC: 0.91 MIU/L (ref 0.3–5)
UNSATURATED IRON BINDING CAPACITY: 217 UG/DL (ref 112–347)
VITAMIN B-12: 489 PG/ML (ref 232–1245)
VITAMIN D 25-HYDROXY: 11.3 NG/ML (ref 30–100)
VLDLC SERPL CALC-MCNC: NORMAL MG/DL (ref 1–30)
WBC # BLD: 4.9 K/UL (ref 3.5–11.3)

## 2019-01-24 PROCEDURE — 80061 LIPID PANEL: CPT

## 2019-01-24 PROCEDURE — 82607 VITAMIN B-12: CPT

## 2019-01-24 PROCEDURE — 83036 HEMOGLOBIN GLYCOSYLATED A1C: CPT

## 2019-01-24 PROCEDURE — 82306 VITAMIN D 25 HYDROXY: CPT

## 2019-01-24 PROCEDURE — 83550 IRON BINDING TEST: CPT

## 2019-01-24 PROCEDURE — 85027 COMPLETE CBC AUTOMATED: CPT

## 2019-01-24 PROCEDURE — 82746 ASSAY OF FOLIC ACID SERUM: CPT

## 2019-01-24 PROCEDURE — 83540 ASSAY OF IRON: CPT

## 2019-01-24 PROCEDURE — 36415 COLL VENOUS BLD VENIPUNCTURE: CPT

## 2019-01-24 PROCEDURE — 84481 FREE ASSAY (FT-3): CPT

## 2019-01-24 PROCEDURE — 87389 HIV-1 AG W/HIV-1&-2 AB AG IA: CPT

## 2019-01-24 PROCEDURE — 84443 ASSAY THYROID STIM HORMONE: CPT

## 2019-01-24 PROCEDURE — 80053 COMPREHEN METABOLIC PANEL: CPT

## 2019-01-24 PROCEDURE — 84439 ASSAY OF FREE THYROXINE: CPT

## 2019-01-28 ENCOUNTER — OFFICE VISIT (OUTPATIENT)
Dept: FAMILY MEDICINE CLINIC | Age: 29
End: 2019-01-28
Payer: MEDICARE

## 2019-01-28 VITALS
BODY MASS INDEX: 25.79 KG/M2 | SYSTOLIC BLOOD PRESSURE: 115 MMHG | DIASTOLIC BLOOD PRESSURE: 81 MMHG | OXYGEN SATURATION: 100 % | HEIGHT: 68 IN | HEART RATE: 76 BPM | WEIGHT: 170.2 LBS

## 2019-01-28 DIAGNOSIS — M77.41 METATARSALGIA OF RIGHT FOOT: ICD-10-CM

## 2019-01-28 DIAGNOSIS — E55.9 VITAMIN D DEFICIENCY: Primary | ICD-10-CM

## 2019-01-28 PROCEDURE — 4004F PT TOBACCO SCREEN RCVD TLK: CPT | Performed by: FAMILY MEDICINE

## 2019-01-28 PROCEDURE — G8427 DOCREV CUR MEDS BY ELIG CLIN: HCPCS | Performed by: FAMILY MEDICINE

## 2019-01-28 PROCEDURE — G8484 FLU IMMUNIZE NO ADMIN: HCPCS | Performed by: FAMILY MEDICINE

## 2019-01-28 PROCEDURE — 96160 PT-FOCUSED HLTH RISK ASSMT: CPT | Performed by: FAMILY MEDICINE

## 2019-01-28 PROCEDURE — G8419 CALC BMI OUT NRM PARAM NOF/U: HCPCS | Performed by: FAMILY MEDICINE

## 2019-01-28 PROCEDURE — 99213 OFFICE O/P EST LOW 20 MIN: CPT | Performed by: FAMILY MEDICINE

## 2019-01-28 RX ORDER — ERGOCALCIFEROL 1.25 MG/1
50000 CAPSULE ORAL WEEKLY
Qty: 12 CAPSULE | Refills: 3 | Status: SHIPPED | OUTPATIENT
Start: 2019-01-28 | End: 2019-05-14

## 2019-01-28 ASSESSMENT — PATIENT HEALTH QUESTIONNAIRE - PHQ9
2. FEELING DOWN, DEPRESSED OR HOPELESS: 0
1. LITTLE INTEREST OR PLEASURE IN DOING THINGS: 3
SUM OF ALL RESPONSES TO PHQ QUESTIONS 1-9: 0
5. POOR APPETITE OR OVEREATING: 0
9. THOUGHTS THAT YOU WOULD BE BETTER OFF DEAD, OR OF HURTING YOURSELF: 0
SUM OF ALL RESPONSES TO PHQ QUESTIONS 1-9: 6
4. FEELING TIRED OR HAVING LITTLE ENERGY: 0
7. TROUBLE CONCENTRATING ON THINGS, SUCH AS READING THE NEWSPAPER OR WATCHING TELEVISION: 0
6. FEELING BAD ABOUT YOURSELF - OR THAT YOU ARE A FAILURE OR HAVE LET YOURSELF OR YOUR FAMILY DOWN: 0
8. MOVING OR SPEAKING SO SLOWLY THAT OTHER PEOPLE COULD HAVE NOTICED. OR THE OPPOSITE, BEING SO FIGETY OR RESTLESS THAT YOU HAVE BEEN MOVING AROUND A LOT MORE THAN USUAL: 0
2. FEELING DOWN, DEPRESSED OR HOPELESS: 3
SUM OF ALL RESPONSES TO PHQ9 QUESTIONS 1 & 2: 0
1. LITTLE INTEREST OR PLEASURE IN DOING THINGS: 0
3. TROUBLE FALLING OR STAYING ASLEEP: 0
SUM OF ALL RESPONSES TO PHQ9 QUESTIONS 1 & 2: 6
SUM OF ALL RESPONSES TO PHQ QUESTIONS 1-9: 6
SUM OF ALL RESPONSES TO PHQ QUESTIONS 1-9: 0
10. IF YOU CHECKED OFF ANY PROBLEMS, HOW DIFFICULT HAVE THESE PROBLEMS MADE IT FOR YOU TO DO YOUR WORK, TAKE CARE OF THINGS AT HOME, OR GET ALONG WITH OTHER PEOPLE: 0

## 2019-01-28 ASSESSMENT — ENCOUNTER SYMPTOMS
ABDOMINAL PAIN: 0
CHEST TIGHTNESS: 0
VOMITING: 0
SINUS PRESSURE: 0
BACK PAIN: 0
EYE DISCHARGE: 0
DIARRHEA: 0
COLOR CHANGE: 0
VOICE CHANGE: 0
NAUSEA: 0
SHORTNESS OF BREATH: 0
EYE PAIN: 0
ABDOMINAL DISTENTION: 0
SORE THROAT: 0
CONSTIPATION: 0
RHINORRHEA: 0

## 2019-02-06 ENCOUNTER — OFFICE VISIT (OUTPATIENT)
Dept: PODIATRY | Age: 29
End: 2019-02-06
Payer: MEDICARE

## 2019-02-06 VITALS — WEIGHT: 170 LBS | HEIGHT: 68 IN | RESPIRATION RATE: 16 BRPM | BODY MASS INDEX: 25.76 KG/M2

## 2019-02-06 DIAGNOSIS — R26.2 DIFFICULTY WALKING: ICD-10-CM

## 2019-02-06 DIAGNOSIS — M79.604 PAIN OF RIGHT LOWER EXTREMITY: ICD-10-CM

## 2019-02-06 DIAGNOSIS — D23.71 BENIGN NEOPLASM OF SKIN OF RIGHT FOOT: Primary | ICD-10-CM

## 2019-02-06 PROCEDURE — G8427 DOCREV CUR MEDS BY ELIG CLIN: HCPCS | Performed by: PODIATRIST

## 2019-02-06 PROCEDURE — G8484 FLU IMMUNIZE NO ADMIN: HCPCS | Performed by: PODIATRIST

## 2019-02-06 PROCEDURE — 17110 DESTRUCTION B9 LES UP TO 14: CPT | Performed by: PODIATRIST

## 2019-02-06 PROCEDURE — 4004F PT TOBACCO SCREEN RCVD TLK: CPT | Performed by: PODIATRIST

## 2019-02-06 PROCEDURE — 99213 OFFICE O/P EST LOW 20 MIN: CPT | Performed by: PODIATRIST

## 2019-02-06 PROCEDURE — G8419 CALC BMI OUT NRM PARAM NOF/U: HCPCS | Performed by: PODIATRIST

## 2019-04-09 ENCOUNTER — OFFICE VISIT (OUTPATIENT)
Dept: PODIATRY | Age: 29
End: 2019-04-09
Payer: MEDICARE

## 2019-04-09 VITALS — BODY MASS INDEX: 25.76 KG/M2 | RESPIRATION RATE: 16 BRPM | HEIGHT: 68 IN | WEIGHT: 170 LBS

## 2019-04-09 DIAGNOSIS — D23.72 BENIGN NEOPLASM OF SKIN OF LOWER LIMB, INCLUDING HIP, LEFT: ICD-10-CM

## 2019-04-09 DIAGNOSIS — M79.671 BILATERAL FOOT PAIN: ICD-10-CM

## 2019-04-09 DIAGNOSIS — D23.71 BENIGN NEOPLASM OF SKIN OF RIGHT FOOT: Primary | ICD-10-CM

## 2019-04-09 DIAGNOSIS — M79.672 BILATERAL FOOT PAIN: ICD-10-CM

## 2019-04-09 DIAGNOSIS — R26.2 DIFFICULTY WALKING: ICD-10-CM

## 2019-04-09 PROCEDURE — 4004F PT TOBACCO SCREEN RCVD TLK: CPT | Performed by: PODIATRIST

## 2019-04-09 PROCEDURE — 17110 DESTRUCTION B9 LES UP TO 14: CPT | Performed by: PODIATRIST

## 2019-04-09 PROCEDURE — 99213 OFFICE O/P EST LOW 20 MIN: CPT | Performed by: PODIATRIST

## 2019-04-09 PROCEDURE — G8428 CUR MEDS NOT DOCUMENT: HCPCS | Performed by: PODIATRIST

## 2019-04-09 PROCEDURE — G8419 CALC BMI OUT NRM PARAM NOF/U: HCPCS | Performed by: PODIATRIST

## 2019-04-09 NOTE — PROGRESS NOTES
[facility]  [facility] [address]  [address] [place]   Dept: [phone]  Dept Fax: [phone]    BENIGN NEOPLASM PROGRESS NOTE  Date of patient's visit: 4/9/2019  Patient's Name:  Subhash Mason YOB: 1990            Patient Care Team:  Leena Roy MD as PCP - General (Family Medicine)  Leena Roy MD as PCP - S Attributed Provider  Florencia Rodriguez DPM as Physician (Podiatry)  Daisy Sorensen DPM as Physician (Podiatry)        Subhash Mason is a 25 y.o. male with the chief complaint of painful lesions on his bilateral foot but worse    feet. . They have been present for yearsduration. The lesions are present on the bilateral foot. The patient has 2 lesion that is deep seated and has a painful core. Treatment has included prior surgery by other podiatrist and debridement here. Review of Systems    Review of Systems:  History obtained fromchart review and the patient  General ROS: negative for - chills, fatigue, fever, night sweats or weight gain  Constitutional: Negative for chills, diaphoresis, fatigue, fever and unexpected weight change. Musculoskeletal: Positive for arthralgias, gait problem and joint swelling. Neurological ROS: negative for -behavioral changes, confusion, headaches or seizures. Negative for weakness and numbness. Dermatological ROS: negative for - mole changes, rash  Cardiovascular: Negative for leg swelling. Gastrointestinal: Negative for constipation, diarrhea, nausea and vomiting. Lower extremity physical exam:    Vascular: Pedal pulses are palpable. No edema or varicosities. Neurology: Sensation is normal. Reflexes are within normal limits. Orthopedic: Joint range of motion is normal. Muscle strength is normal.Osseous prominence noted in association with hyperkeratosis. Decreasefat pad noted. Pain to palpation to prominence and lesion.      Dermatology:  Skin lesion present to the right and left plantar foot with a central core and petchaie noted to the lesions periphery. Pain on palpation of the lesion      X rays 3 views right foot:  Metal marker seen. No foreign body. No bony abnormality. No DJD  Assessment:   Diagnosis Orders   1. Benign neoplasm of skin of right foot  XR FOOT RIGHT (MIN 3 VIEWS)    07014 - [place] DESTRUCTION BENIGN LESIONS UP TO 14    XR FOOT RIGHT (MIN 3 VIEWS)   2. Difficulty walking  XR FOOT RIGHT (MIN 3 VIEWS)    55270 - [place] DESTRUCTION BENIGN LESIONS UP TO 14    XR FOOT RIGHT (MIN 3 VIEWS)   3. Bilateral foot pain  XR FOOT RIGHT (MIN 3 VIEWS)    97559 - [place] DESTRUCTION BENIGN LESIONS UP TO 14    XR FOOT RIGHT (MIN 3 VIEWS)   4. Benign neoplasm of skin of lower limb, including hip, left  XR FOOT RIGHT (MIN 3 VIEWS)    21686 - [place] DESTRUCTION BENIGN LESIONS UP TO 14    XR FOOT RIGHT (MIN 3 VIEWS)       Plan:  The lesion was partially debrided and silver nitrate was applied with an apperature pad under occlusion. The patient will leave in place for 24-48 hours and than remove. The patient tolerated the procedure well and without complication.    Pt will follow up in 9 weeks     Electronically signed by Electronically signed by Daisy Sorensen DPM on 4/9/2019 at 8:24 AM

## 2019-04-29 ENCOUNTER — OFFICE VISIT (OUTPATIENT)
Dept: PODIATRY | Age: 29
End: 2019-04-29
Payer: MEDICARE

## 2019-04-29 VITALS — BODY MASS INDEX: 25.76 KG/M2 | WEIGHT: 170 LBS | HEIGHT: 68 IN | RESPIRATION RATE: 16 BRPM

## 2019-04-29 DIAGNOSIS — M79.671 BILATERAL FOOT PAIN: ICD-10-CM

## 2019-04-29 DIAGNOSIS — D23.71 BENIGN NEOPLASM OF SKIN OF RIGHT FOOT: Primary | ICD-10-CM

## 2019-04-29 DIAGNOSIS — M20.42 HAMMER TOES OF BOTH FEET: ICD-10-CM

## 2019-04-29 DIAGNOSIS — M79.672 BILATERAL FOOT PAIN: ICD-10-CM

## 2019-04-29 DIAGNOSIS — M21.612 BUNION, LEFT FOOT: ICD-10-CM

## 2019-04-29 DIAGNOSIS — M20.41 HAMMER TOES OF BOTH FEET: ICD-10-CM

## 2019-04-29 DIAGNOSIS — R26.2 DIFFICULTY WALKING: ICD-10-CM

## 2019-04-29 PROCEDURE — 17110 DESTRUCTION B9 LES UP TO 14: CPT | Performed by: PODIATRIST

## 2019-04-29 PROCEDURE — 4004F PT TOBACCO SCREEN RCVD TLK: CPT | Performed by: PODIATRIST

## 2019-04-29 PROCEDURE — 99213 OFFICE O/P EST LOW 20 MIN: CPT | Performed by: PODIATRIST

## 2019-04-29 PROCEDURE — G8428 CUR MEDS NOT DOCUMENT: HCPCS | Performed by: PODIATRIST

## 2019-04-29 PROCEDURE — G8419 CALC BMI OUT NRM PARAM NOF/U: HCPCS | Performed by: PODIATRIST

## 2019-04-29 NOTE — PROGRESS NOTES
[facility]  [facility] [address]  [address] [place]   Dept: [phone]  Dept Fax: [phone]    RETURN PATIENT PROGRESS NOTE  Date of patient's visit: 4/29/2019  Patient's Name:  Criselda Epley YOB: 1990            Patient Care Team:  Magy Mack MD as PCP - General (Family Medicine)  Magy Mack MD as PCP - S Attributed Provider  Starla Turner DPM as Physician (Podiatry)  Luis Enrique Williamson DPM as Physician (Podiatry)       Criselda Epley 25 y.o. male that presents for follow-up of lesion to right foot and bilateral foot pain  Chief Complaint   Patient presents with    Benign Neoplasm    Foot Pain     bilateral     Pt's primary care physician is Pia Urban MD   Symptoms began year(s) ago and are unchanged . Patient relates pain is Present. Pain is rated 5 out of 10 and is described as intermittent. Treatments prior to today's visit include: debridement of lesions at office visits. He would like to discuss surgery for left foot bunion. Currently denies F/C/N/V. Allergies   Allergen Reactions    Seasonal        Past Medical History:   Diagnosis Date    Diarrhea     Eczema     Poor historian        Prior to Admission medications    Medication Sig Start Date End Date Taking? Authorizing Provider   vitamin D (ERGOCALCIFEROL) 43215 units CAPS capsule Take 1 capsule by mouth once a week 1/28/19  Yes Magy Mack MD   PARoxetine (PAXIL) 20 MG tablet Take 1 tablet by mouth daily 11/14/18  Yes Magy Mack MD   pantoprazole (PROTONIX) 40 MG tablet Take 1 tablet by mouth daily 11/14/18  Yes Magy Mack MD       Review of Systems    Review of Systems:  History obtained from chart review and the patient  General ROS: negative for - chills, fatigue, fever, night sweats or weight gain  Constitutional: Negative for chills, diaphoresis, fatigue, fever and unexpected weight change.   Musculoskeletal: Positive for arthralgias, gait problem and joint swelling. Neurological ROS: negative for - behavioral changes, confusion, headaches or seizures. Negative for weakness and numbness. Dermatological ROS: negative for - mole changes, rash  Cardiovascular: Negative for leg swelling. Gastrointestinal: Negative for constipation, diarrhea, nausea and vomiting. Lower Extremity Physical Examination:     Vitals:   Vitals:    04/29/19 0830   Resp: 16     General: AAO x 3 in NAD. Dermatologic Exam:  Skin lesion/ulceration Absent . Skin No rashes or nodules noted. .       Musculoskeletal:     1st MPJ ROM decreased, Bilateral. Increased lateral hallux deviation with enlarged medial erik exostosis 1st MPJ, left foot. Muscle strength 5/5, Bilateral.  Pain present upon palpation of 1st MPJ and skin lesions plantar 2nd MTH, toya. Medial longitudinal arch, Bilateral WNL. Ankle ROM WNL,Bilateral.    Dorsally contracted digits digits 2  Bilateral.     Vascular: DP and PT pulses palpable 2/4, Bilateral.  CFT <3 seconds, Bilateral.  Hair growth present to the level of the digits, Bilateral.  Edema absent, Bilateral.  Varicosities absent, Bilateral. Erythema absent, Bilateral    Neurological: Sensation intact to light touch to level of digits, Bilateral.  Protective sensation intact 10/10 sites via 5.07/10g Troy-Spike Monofilament, Bilateral.  negative Tinel's, Bilateral.  negative Valleix sign, Bilateral.      Integument: Warm, dry, supple, Bilateral. Benign skin neoplasm sub 2nd MTH, otya and left 2nd PIPJ with petechiae. Open lesion absent, Bilateral.  Interdigital maceration absent to web spaces 1-4, Bilateral.  Nails are normal in length, thickness and color 1-5 bilateral.  Fissures absent, Bilateral.     Asessment: Patient is a 25 y.o. male with:    Diagnosis Orders   1. Benign neoplasm of skin of right foot  46222 - [place] DESTRUCTION BENIGN LESIONS UP TO 14   2. Difficulty walking  11821 - [place] DESTRUCTION BENIGN LESIONS UP TO 14   3. Bilateral foot pain  13133 - [place] DESTRUCTION BENIGN LESIONS UP TO 14   4. Bunion, left foot  51629 - [place] DESTRUCTION BENIGN LESIONS UP TO 14   5. Hammer toes of both feet  11223 - [place] DESTRUCTION BENIGN LESIONS UP TO 14       Plan: Patient examined and evaluated. Current condition and treatment options discussed in detail. The lesion was partially excised and Pyrogallic acid was applied under occlusion. The patient will leave in place for 24-48 hours and than remove. The patient tolerated the procedure well and without complication. Discussed in depth that surgery is last treatment option. He would like to proceed with surgery to correct left foot bunion and hammertoes. All questions answered and patient understands. Verbal and written instructions given to patient. Contact office with any questions/problems/concerns. No orders of the defined types were placed in this encounter. No orders of the defined types were placed in this encounter. RTC in 1week(s) s/p surgery date.     4/29/2019      Electronically signed by Gutierrez Turner DPM on 4/29/2019 at 8:43 AM  4/29/2019

## 2019-05-14 ENCOUNTER — HOSPITAL ENCOUNTER (OUTPATIENT)
Dept: PREADMISSION TESTING | Age: 29
Discharge: HOME | End: 2019-05-18
Payer: MEDICARE

## 2019-05-14 VITALS
HEART RATE: 60 BPM | RESPIRATION RATE: 16 BRPM | WEIGHT: 173.6 LBS | DIASTOLIC BLOOD PRESSURE: 74 MMHG | HEIGHT: 68 IN | BODY MASS INDEX: 26.31 KG/M2 | SYSTOLIC BLOOD PRESSURE: 137 MMHG | OXYGEN SATURATION: 100 %

## 2019-05-14 LAB
ABSOLUTE EOS #: 0.2 K/UL (ref 0–0.44)
ABSOLUTE IMMATURE GRANULOCYTE: 0.02 K/UL (ref 0–0.3)
ABSOLUTE LYMPH #: 1.84 K/UL (ref 1.1–3.7)
ABSOLUTE MONO #: 0.55 K/UL (ref 0.1–1.2)
BASOPHILS # BLD: 0 % (ref 0–2)
BASOPHILS ABSOLUTE: <0.03 K/UL (ref 0–0.2)
DIFFERENTIAL TYPE: NORMAL
EOSINOPHILS RELATIVE PERCENT: 4 % (ref 1–4)
HCT VFR BLD CALC: 42.9 % (ref 40.7–50.3)
HEMOGLOBIN: 14.6 G/DL (ref 13–17)
IMMATURE GRANULOCYTES: 0 %
LYMPHOCYTES # BLD: 33 % (ref 24–43)
MCH RBC QN AUTO: 32.4 PG (ref 25.2–33.5)
MCHC RBC AUTO-ENTMCNC: 34 G/DL (ref 28.4–34.8)
MCV RBC AUTO: 95.3 FL (ref 82.6–102.9)
MONOCYTES # BLD: 10 % (ref 3–12)
NRBC AUTOMATED: 0 PER 100 WBC
PDW BLD-RTO: 13 % (ref 11.8–14.4)
PLATELET # BLD: 197 K/UL (ref 138–453)
PLATELET ESTIMATE: NORMAL
PMV BLD AUTO: 11.6 FL (ref 8.1–13.5)
RBC # BLD: 4.5 M/UL (ref 4.21–5.77)
RBC # BLD: NORMAL 10*6/UL
SEG NEUTROPHILS: 53 % (ref 36–65)
SEGMENTED NEUTROPHILS ABSOLUTE COUNT: 2.93 K/UL (ref 1.5–8.1)
WBC # BLD: 5.6 K/UL (ref 3.5–11.3)
WBC # BLD: NORMAL 10*3/UL

## 2019-05-14 PROCEDURE — 85025 COMPLETE CBC W/AUTO DIFF WBC: CPT

## 2019-05-14 PROCEDURE — 36415 COLL VENOUS BLD VENIPUNCTURE: CPT

## 2019-05-14 NOTE — H&P
History and Physical Service   [place] [address]    HISTORY AND PHYSICAL EXAMINATION            Date of Evaluation: 5/14/2019  Patient name:  Laurita Parra  MRN:   [id number]  YOB: 1990  PCP:    Suma Lundy MD    History Obtained From:     Patient    History of Present Illness: This is Laurita Parra a 25 y.o. male who presents for a pre-admission testing appointment for an upcoming left foot lapidus weil 2nd metatarsal 2nd arthroplasty, right 2nd metatarsal condylectomy by Dr. Gretchen Sanchez scheduled on 05/16/2019 at 0730 due to left HAV, benign neoplasm. The patient's chief complaint is constant, 4-9/10 bilateral foot pain. Right foot pain has been present for 4-5 years and the left foot pain started 7 months ago. Pain is aggravated by walking and wearing shoes. Pain is minimally relieved with rest.  Pt denies taking pain medication. Numbness and tingling in the right hallux and right 2nd toe. Right 3rd toe edema. Pt reports a nail punctured his right foot in [id number]. S/p right bunionectomy in 2017. Denies recent falls and injuries. Past Medical History:     Past Medical History:   Diagnosis Date    Diarrhea     Resolved (Written 05/14/2019)    Eczema     Poor historian     Pt denies cardiac disease, respiratory disease, and diabetes. Past Surgical History:     Past Surgical History:   Procedure Laterality Date    BUNIONECTOMY Right 12/1/2017    FOOT BUNIONECTOMY OLIVER / HAMMERTOE REPAIR 2ND TOE & MPJ RELEASE 2ND METATARSAL performed by Joan Ribera DPM at [address] Right     removed part of foreign body        Medications Prior to Admission:     Prior to Admission medications    Not on File        Allergies:     Seasonal    Social History:     Tobacco:    reports that he has been smoking cigarettes. He started smoking about 6 years ago. He has been smoking about 0.00 packs per day for the past 4.00 years.  He has never used smokeless tobacco.  Alcohol:      has no alcohol history on file. Drug Use:  reports that he has current or past drug history. Drug: Marijuana. Frequency: 7.00 times per week. Instructed pt not to smoke the day of surgery. Pt voiced understanding. Functional Capacity:   1) Pt is able to walk 2 city blocks or more on level ground without SOB. 2) Pt is able to climb 2 flights of stairs without SOB. 3) Pt is able to walk up a hill for 1-2 city blocks without SOB. Family History:     Family History   Adopted: Yes   Family history unknown: Yes       Review of Systems:     Positive and Negative as described in HPI. CONSTITUTIONAL: Negative for fevers, chills, sweats, fatigue, and weight loss. HEENT:  Negative for glasses, hearing changes, rhinorrhea, and throat pain. RESPIRATORY: Denies asthma, COPD, and sleep apnea. Negative for shortness of breath, cough, congestion, and wheezing. CARDIOVASCULAR: Negative for chest pain, blood clot, irregular heartbeat, and palpitations. GASTROINTESTINAL: Negative for reflux, nausea, vomiting, diarrhea, constipation, change in bowel habits, and abdominal pain. GENITOURINARY: Negative for difficulty of urination, burning with urination, and frequency. INTEGUMENT: Negative for rash, skin lesions, and easy bruising. HEMATOLOGIC/LYMPHATIC: See HPI. ALLERGIC/IMMUNOLOGIC: Negative for urticaria and itching. ENDOCRINE: Negative for diabetes, increase in thirst, increase in urination, and heat or cold intolerance. MUSCULOSKELETAL: See HPI. NEUROLOGICAL: Numbness and tingling in the right hallux and the right 2nd toe. Negative for headaches, dizziness, and lightheadedness. [place] BEHAVIOR/PSYCH: Anxiety. Negative for depression. Physical Exam:   /74   Pulse 60   Resp 16   Ht 5' 8\" (1.727 m)   Wt 173 lb 9.6 oz (78.7 kg)   SpO2 100%   BMI 26.40 kg/m²       General Appearance:  Alert, well appearing, and in no acute distress.    Mental status:  Oriented to person, place, and time. Head:  Normocephalic and atraumatic. Eye: Right lazy eye. No icterus, redness, pupils equal and reactive, left extraocular eye movements intact, and conjunctiva clear. Ear:  Hearing grossly intact. Nose:  No drainage noted. Mouth:  Airway is patent. Mucous membranes moist.  Neck:  Supple and no carotid bruits noted. Lungs:  Bilateral equal air entry, clear to auscultation, no wheezing, rales or rhonchi, and normal effort. Cardiovascular:  Normal rate, regular rhythm, no murmur, gallop, or rub. Abdomen:  Soft, non-tender, non-distended, and active bowel sounds. Neurologic:  Normal speech and cranial nerves II, V, VII through XII grossly intact. Strength 5/5 bilaterally. Skin: Multiple tattoos. No gross lesions, rashes, bruising, or bleeding on exposed skin area. Extremities:  Posterior tibial pulses 2+ bilaterally. No calf tenderness with palpation. Psych:  Normal affect.      Investigations:      Laboratory Testing:  Recent Results (from the past 24 hour(s))   CBC Auto Differential    Collection Time: 05/14/19 12:30 PM   Result Value Ref Range    WBC 5.6 3.5 - 11.3 k/uL    RBC 4.50 4.21 - 5.77 m/uL    Hemoglobin 14.6 13.0 - 17.0 g/dL    Hematocrit 42.9 40.7 - 50.3 %    MCV 95.3 82.6 - 102.9 fL    MCH 32.4 25.2 - 33.5 pg    MCHC 34.0 28.4 - 34.8 g/dL    RDW 13.0 11.8 - 14.4 %    Platelets 074 050 - 416 k/uL    MPV 11.6 8.1 - 13.5 fL    NRBC Automated 0.0 0.0 per 100 WBC    Differential Type NOT REPORTED     Seg Neutrophils 53 36 - 65 %    Lymphocytes 33 24 - 43 %    Monocytes 10 3 - 12 %    Eosinophils % 4 1 - 4 %    Basophils 0 0 - 2 %    Immature Granulocytes 0 0 %    Segs Absolute 2.93 1.50 - 8.10 k/uL    Absolute Lymph # 1.84 1.10 - 3.70 k/uL    Absolute Mono # 0.55 0.10 - 1.20 k/uL    Absolute Eos # 0.20 0.00 - 0.44 k/uL    Basophils # <0.03 0.00 - 0.20 k/uL    Absolute Immature Granulocyte 0.02 0.00 - 0.30 k/uL    WBC Morphology NOT REPORTED     RBC Morphology NOT REPORTED Platelet Estimate NOT REPORTED        Recent Labs     05/14/19  1230   HGB 14.6   HCT 42.9   WBC 5.6   MCV 95.3     Diagnosis:      1. Left HAV, benign neoplasm    Plans:     1.  Left foot lapidus weil 2nd metatarsal 2nd arthroplasty, right 2nd metatarsal condylectomy      RODNEY Sánchez CNP  5/14/2019  12:51 PM

## 2019-05-14 NOTE — PRE-PROCEDURE INSTRUCTIONS
[address] [facility] ON Thursday, 5/16/19 at 6:00 AM    Once you enter the hospital lobby, take the elevators to the second floor. Check-In is at the surgery registration desk. If you have been given a blood band, you must bring it with you the day of surgery. Continue to take your home medications as you normally do up to and including the night before surgery with the exception of any blood thinning medications. Please stop any blood thinning medications as directed by your surgeon or prescribing physician. Failure to stop certain medications may interfere with your scheduled surgery. These may include:  Aspirin, Warfarin (Coumadin), Clopidogrel (Plavix), Ibuprofen (Motrin, Advil), Naproxen (Aleve), Meloxicam (Mobic), Celecoxib (Celebrex), Diclofenac, Eliquis, Pradaxa, Xarelto, Effient, Fish Oil, Herbal supplements. Tylenol/Acetaminophen is okay. If you are diabetic, do not take any of your diabetic medications by mouth the morning of surgery. If you are taking insulin contact the doctor that manages your diabetes for instructions about any changes to your insulin dosages the day before surgery. Do not inject insulin or other injectable diabetic medications the morning of surgery unless otherwise instructed by the doctor who manages your diabetes. Please take the following medication(s) the day of surgery with a small sip of water:  None. Please use your inhalers at home the day of surgery. PREPARING FOR YOUR SURGERY:     Before surgery, you can play an important role in your own health. Because skin is not sterile, we need to be sure that your skin is as free of germs as possible before surgery by carefully washing before surgery. Preparing or prepping skin before surgery can reduce the risk of a surgical site infection.   Do not shave the area of your body where your surgery will be performed unless you received specific permission from your physician.     You will need to shower at home the night before surgery and the morning of surgery with a special soap called chlorhexidine gluconate (CHG*). *Not to be used by people allergic to Chlorhexidine Gluconate (CHG). Following these instructions will help you be sure that your skin is clean before surgery. Instructions on cleaning your skin before surgery: The night before your surgery:      You will need to shower with warm water (not hot) and the CHG soap.  Use a clean wash cloth and a clean towel. Have clean clothes available to put on after the shower.    First wash your hair with regular shampoo. Rinse your hair and body thoroughly to remove the shampoo. [facility] Wash your face with your regular soap or water only. Thoroughly rinse your body with warm water from the neck down.  Turn water off to prevent rinsing the soap off too soon.  With a clean wet washcloth and half of the CHG soap in the bottle, lather your entire body from the neck down. Do not use CHG soap near your eyes or ears to avoid injury to those areas.  Wash thoroughly, paying special attention to the area where your surgery will be performed.  Wash your body gently for five (5) minutes. Avoid scrubbing your skin too hard.  Turn the water back on and rinse your body thoroughly.  Pat yourself dry with a clean, soft towel. Do not apply lotion, cream or powder.  Dress with clean freshly washed clothes. The morning of surgery:     Repeat shower following steps above - using remaining half of CHG soap in bottle. Patient Instructions:    [facility] If you are having any type of anesthesia you are to have nothing to eat or drink after midnight the night before your surgery. This includes gum, mints, water or smoking or chewing tobacco.  The only exception to this is a small sip of water to take with any morning dose of heart, blood pressure, or seizure medications.   No alcoholic beverages for 24 hours prior to make your surgical experience as comfortable as possible    . Transportation After Your Surgery/Procedure: You will need a friend or family member to drive you home after your procedure. Your  must be 25years of age or older and able to sign off on your discharge instructions. A taxi cab or any other form of public transportation is not acceptable. Your friend or family member must stay at the hospital throughout your procedure. Someone must remain with you for the first 24 hours after your surgery if you receive anesthesia or medication. If you do not have someone to stay with you, your procedure may be cancelled.       If you have any other questions regarding your procedure or the day of surgery, please call [phone]      _________________________  ____________________________  Signature (Patient)              Signature (Provider) & date

## 2019-05-15 ENCOUNTER — ANESTHESIA EVENT (OUTPATIENT)
Dept: OPERATING ROOM | Age: 29
End: 2019-05-15
Payer: MEDICARE

## 2019-05-16 ENCOUNTER — APPOINTMENT (OUTPATIENT)
Dept: GENERAL RADIOLOGY | Age: 29
End: 2019-05-16
Attending: PODIATRIST
Payer: MEDICARE

## 2019-05-16 ENCOUNTER — HOSPITAL ENCOUNTER (OUTPATIENT)
Age: 29
Setting detail: OUTPATIENT SURGERY
Discharge: HOME | End: 2019-05-16
Attending: PODIATRIST | Admitting: PODIATRIST
Payer: MEDICARE

## 2019-05-16 ENCOUNTER — ANESTHESIA (OUTPATIENT)
Dept: OPERATING ROOM | Age: 29
End: 2019-05-16
Payer: MEDICARE

## 2019-05-16 VITALS — SYSTOLIC BLOOD PRESSURE: 114 MMHG | DIASTOLIC BLOOD PRESSURE: 59 MMHG | TEMPERATURE: 95.9 F | OXYGEN SATURATION: 100 %

## 2019-05-16 VITALS
HEART RATE: 58 BPM | TEMPERATURE: 97.5 F | BODY MASS INDEX: 26.22 KG/M2 | OXYGEN SATURATION: 99 % | DIASTOLIC BLOOD PRESSURE: 71 MMHG | RESPIRATION RATE: 13 BRPM | HEIGHT: 68 IN | SYSTOLIC BLOOD PRESSURE: 139 MMHG | WEIGHT: 173 LBS

## 2019-05-16 DIAGNOSIS — G89.18 ACUTE POST-OPERATIVE PAIN: Primary | ICD-10-CM

## 2019-05-16 PROCEDURE — 7100000010 HC PHASE II RECOVERY - FIRST 15 MIN: Performed by: PODIATRIST

## 2019-05-16 PROCEDURE — 6360000002 HC RX W HCPCS: Performed by: NURSE ANESTHETIST, CERTIFIED REGISTERED

## 2019-05-16 PROCEDURE — 3700000000 HC ANESTHESIA ATTENDED CARE: Performed by: PODIATRIST

## 2019-05-16 PROCEDURE — 2500000003 HC RX 250 WO HCPCS: Performed by: NURSE ANESTHETIST, CERTIFIED REGISTERED

## 2019-05-16 PROCEDURE — 7100000001 HC PACU RECOVERY - ADDTL 15 MIN: Performed by: PODIATRIST

## 2019-05-16 PROCEDURE — 7100000000 HC PACU RECOVERY - FIRST 15 MIN: Performed by: PODIATRIST

## 2019-05-16 PROCEDURE — 3600000013 HC SURGERY LEVEL 3 ADDTL 15MIN: Performed by: PODIATRIST

## 2019-05-16 PROCEDURE — 2500000003 HC RX 250 WO HCPCS: Performed by: PODIATRIST

## 2019-05-16 PROCEDURE — C1713 ANCHOR/SCREW BN/BN,TIS/BN: HCPCS | Performed by: PODIATRIST

## 2019-05-16 PROCEDURE — 2709999900 HC NON-CHARGEABLE SUPPLY: Performed by: PODIATRIST

## 2019-05-16 PROCEDURE — 2580000003 HC RX 258: Performed by: NURSE ANESTHETIST, CERTIFIED REGISTERED

## 2019-05-16 PROCEDURE — 3700000001 HC ADD 15 MINUTES (ANESTHESIA): Performed by: PODIATRIST

## 2019-05-16 PROCEDURE — 3209999900 FLUORO FOR SURGICAL PROCEDURES

## 2019-05-16 PROCEDURE — 2720000010 HC SURG SUPPLY STERILE: Performed by: PODIATRIST

## 2019-05-16 PROCEDURE — 6360000002 HC RX W HCPCS: Performed by: PODIATRIST

## 2019-05-16 PROCEDURE — 28297 COR HLX VLGS JT ARTHRD: CPT | Performed by: PODIATRIST

## 2019-05-16 PROCEDURE — 73620 X-RAY EXAM OF FOOT: CPT

## 2019-05-16 PROCEDURE — 3600000003 HC SURGERY LEVEL 3 BASE: Performed by: PODIATRIST

## 2019-05-16 PROCEDURE — 2580000003 HC RX 258: Performed by: ANESTHESIOLOGY

## 2019-05-16 PROCEDURE — 28308 INCISION OF METATARSAL: CPT | Performed by: PODIATRIST

## 2019-05-16 PROCEDURE — 7100000011 HC PHASE II RECOVERY - ADDTL 15 MIN: Performed by: PODIATRIST

## 2019-05-16 DEVICE — CANNULATED SCREW
Type: IMPLANTABLE DEVICE | Site: FOOT | Status: FUNCTIONAL
Brand: ASNIS

## 2019-05-16 RX ORDER — LIDOCAINE HYDROCHLORIDE 20 MG/ML
INJECTION, SOLUTION EPIDURAL; INFILTRATION; INTRACAUDAL; PERINEURAL PRN
Status: DISCONTINUED | OUTPATIENT
Start: 2019-05-16 | End: 2019-05-16 | Stop reason: SDUPTHER

## 2019-05-16 RX ORDER — FENTANYL CITRATE 50 UG/ML
25 INJECTION, SOLUTION INTRAMUSCULAR; INTRAVENOUS EVERY 5 MIN PRN
Status: DISCONTINUED | OUTPATIENT
Start: 2019-05-16 | End: 2019-05-16 | Stop reason: HOSPADM

## 2019-05-16 RX ORDER — SODIUM CHLORIDE 0.9 % (FLUSH) 0.9 %
10 SYRINGE (ML) INJECTION EVERY 12 HOURS SCHEDULED
Status: DISCONTINUED | OUTPATIENT
Start: 2019-05-16 | End: 2019-05-16 | Stop reason: HOSPADM

## 2019-05-16 RX ORDER — CEFAZOLIN SODIUM 2 G/50ML
2 SOLUTION INTRAVENOUS ONCE
Status: COMPLETED | OUTPATIENT
Start: 2019-05-16 | End: 2019-05-16

## 2019-05-16 RX ORDER — ONDANSETRON 2 MG/ML
4 INJECTION INTRAMUSCULAR; INTRAVENOUS
Status: DISCONTINUED | OUTPATIENT
Start: 2019-05-16 | End: 2019-05-16 | Stop reason: HOSPADM

## 2019-05-16 RX ORDER — KETOROLAC TROMETHAMINE 30 MG/ML
INJECTION, SOLUTION INTRAMUSCULAR; INTRAVENOUS PRN
Status: DISCONTINUED | OUTPATIENT
Start: 2019-05-16 | End: 2019-05-16 | Stop reason: SDUPTHER

## 2019-05-16 RX ORDER — GLYCOPYRROLATE 1 MG/5 ML
SYRINGE (ML) INTRAVENOUS PRN
Status: DISCONTINUED | OUTPATIENT
Start: 2019-05-16 | End: 2019-05-16 | Stop reason: SDUPTHER

## 2019-05-16 RX ORDER — ONDANSETRON 2 MG/ML
INJECTION INTRAMUSCULAR; INTRAVENOUS PRN
Status: DISCONTINUED | OUTPATIENT
Start: 2019-05-16 | End: 2019-05-16 | Stop reason: SDUPTHER

## 2019-05-16 RX ORDER — LIDOCAINE HYDROCHLORIDE 10 MG/ML
INJECTION, SOLUTION EPIDURAL; INFILTRATION; INTRACAUDAL; PERINEURAL PRN
Status: DISCONTINUED | OUTPATIENT
Start: 2019-05-16 | End: 2019-05-16 | Stop reason: ALTCHOICE

## 2019-05-16 RX ORDER — LIDOCAINE HYDROCHLORIDE 10 MG/ML
1 INJECTION, SOLUTION EPIDURAL; INFILTRATION; INTRACAUDAL; PERINEURAL
Status: DISCONTINUED | OUTPATIENT
Start: 2019-05-17 | End: 2019-05-16 | Stop reason: HOSPADM

## 2019-05-16 RX ORDER — OXYCODONE HYDROCHLORIDE AND ACETAMINOPHEN 5; 325 MG/1; MG/1
1 TABLET ORAL
Status: DISCONTINUED | OUTPATIENT
Start: 2019-05-16 | End: 2019-05-16 | Stop reason: HOSPADM

## 2019-05-16 RX ORDER — FENTANYL CITRATE 50 UG/ML
50 INJECTION, SOLUTION INTRAMUSCULAR; INTRAVENOUS EVERY 5 MIN PRN
Status: DISCONTINUED | OUTPATIENT
Start: 2019-05-16 | End: 2019-05-16 | Stop reason: HOSPADM

## 2019-05-16 RX ORDER — BUPIVACAINE HYDROCHLORIDE 5 MG/ML
INJECTION, SOLUTION EPIDURAL; INTRACAUDAL PRN
Status: DISCONTINUED | OUTPATIENT
Start: 2019-05-16 | End: 2019-05-16 | Stop reason: ALTCHOICE

## 2019-05-16 RX ORDER — PHENYLEPHRINE HCL IN 0.9% NACL 1 MG/10 ML
SYRINGE (ML) INTRAVENOUS PRN
Status: DISCONTINUED | OUTPATIENT
Start: 2019-05-16 | End: 2019-05-16 | Stop reason: SDUPTHER

## 2019-05-16 RX ORDER — SODIUM CHLORIDE 9 MG/ML
INJECTION, SOLUTION INTRAVENOUS CONTINUOUS
Status: DISCONTINUED | OUTPATIENT
Start: 2019-05-17 | End: 2019-05-16 | Stop reason: HOSPADM

## 2019-05-16 RX ORDER — SODIUM CHLORIDE 0.9 % (FLUSH) 0.9 %
10 SYRINGE (ML) INJECTION PRN
Status: DISCONTINUED | OUTPATIENT
Start: 2019-05-16 | End: 2019-05-16 | Stop reason: HOSPADM

## 2019-05-16 RX ORDER — OXYCODONE HYDROCHLORIDE AND ACETAMINOPHEN 5; 325 MG/1; MG/1
1 TABLET ORAL EVERY 6 HOURS PRN
Qty: 28 TABLET | Refills: 0 | Status: SHIPPED | OUTPATIENT
Start: 2019-05-16 | End: 2019-05-23

## 2019-05-16 RX ORDER — MIDAZOLAM HYDROCHLORIDE 1 MG/ML
INJECTION INTRAMUSCULAR; INTRAVENOUS PRN
Status: DISCONTINUED | OUTPATIENT
Start: 2019-05-16 | End: 2019-05-16 | Stop reason: SDUPTHER

## 2019-05-16 RX ORDER — SODIUM CHLORIDE, SODIUM LACTATE, POTASSIUM CHLORIDE, CALCIUM CHLORIDE 600; 310; 30; 20 MG/100ML; MG/100ML; MG/100ML; MG/100ML
INJECTION, SOLUTION INTRAVENOUS CONTINUOUS PRN
Status: DISCONTINUED | OUTPATIENT
Start: 2019-05-16 | End: 2019-05-16 | Stop reason: SDUPTHER

## 2019-05-16 RX ORDER — FENTANYL CITRATE 50 UG/ML
INJECTION, SOLUTION INTRAMUSCULAR; INTRAVENOUS PRN
Status: DISCONTINUED | OUTPATIENT
Start: 2019-05-16 | End: 2019-05-16 | Stop reason: SDUPTHER

## 2019-05-16 RX ORDER — SODIUM CHLORIDE, SODIUM LACTATE, POTASSIUM CHLORIDE, CALCIUM CHLORIDE 600; 310; 30; 20 MG/100ML; MG/100ML; MG/100ML; MG/100ML
INJECTION, SOLUTION INTRAVENOUS CONTINUOUS
Status: DISCONTINUED | OUTPATIENT
Start: 2019-05-17 | End: 2019-05-16 | Stop reason: HOSPADM

## 2019-05-16 RX ORDER — PROPOFOL 10 MG/ML
INJECTION, EMULSION INTRAVENOUS PRN
Status: DISCONTINUED | OUTPATIENT
Start: 2019-05-16 | End: 2019-05-16 | Stop reason: SDUPTHER

## 2019-05-16 RX ORDER — DEXAMETHASONE SODIUM PHOSPHATE 10 MG/ML
INJECTION INTRAMUSCULAR; INTRAVENOUS PRN
Status: DISCONTINUED | OUTPATIENT
Start: 2019-05-16 | End: 2019-05-16 | Stop reason: SDUPTHER

## 2019-05-16 RX ADMIN — MIDAZOLAM 2 MG: 1 INJECTION INTRAMUSCULAR; INTRAVENOUS at 07:25

## 2019-05-16 RX ADMIN — SODIUM CHLORIDE, POTASSIUM CHLORIDE, SODIUM LACTATE AND CALCIUM CHLORIDE: 600; 310; 30; 20 INJECTION, SOLUTION INTRAVENOUS at 06:33

## 2019-05-16 RX ADMIN — PROPOFOL 200 MG: 10 INJECTION, EMULSION INTRAVENOUS at 07:31

## 2019-05-16 RX ADMIN — LIDOCAINE HYDROCHLORIDE 80 MG: 20 INJECTION, SOLUTION EPIDURAL; INFILTRATION; INTRACAUDAL; PERINEURAL at 07:31

## 2019-05-16 RX ADMIN — ONDANSETRON 4 MG: 2 INJECTION, SOLUTION INTRAMUSCULAR; INTRAVENOUS at 07:42

## 2019-05-16 RX ADMIN — Medication 100 MCG: at 08:22

## 2019-05-16 RX ADMIN — SODIUM CHLORIDE, POTASSIUM CHLORIDE, SODIUM LACTATE AND CALCIUM CHLORIDE: 600; 310; 30; 20 INJECTION, SOLUTION INTRAVENOUS at 07:25

## 2019-05-16 RX ADMIN — KETOROLAC TROMETHAMINE 30 MG: 30 INJECTION, SOLUTION INTRAMUSCULAR; INTRAVENOUS at 09:34

## 2019-05-16 RX ADMIN — Medication 0.2 MG: at 07:42

## 2019-05-16 RX ADMIN — SODIUM CHLORIDE, POTASSIUM CHLORIDE, SODIUM LACTATE AND CALCIUM CHLORIDE: 600; 310; 30; 20 INJECTION, SOLUTION INTRAVENOUS at 09:02

## 2019-05-16 RX ADMIN — CEFAZOLIN SODIUM 2 G: 2 SOLUTION INTRAVENOUS at 07:44

## 2019-05-16 RX ADMIN — Medication 100 MCG: at 07:31

## 2019-05-16 RX ADMIN — DEXAMETHASONE SODIUM PHOSPHATE 10 MG: 10 INJECTION INTRAMUSCULAR; INTRAVENOUS at 07:42

## 2019-05-16 RX ADMIN — Medication 50 MCG: at 09:40

## 2019-05-16 RX ADMIN — Medication 50 MCG: at 09:46

## 2019-05-16 ASSESSMENT — PULMONARY FUNCTION TESTS
PIF_VALUE: 15
PIF_VALUE: 15
PIF_VALUE: 1
PIF_VALUE: 15
PIF_VALUE: 15
PIF_VALUE: 12
PIF_VALUE: 14
PIF_VALUE: 14
PIF_VALUE: 15
PIF_VALUE: 12
PIF_VALUE: 12
PIF_VALUE: 13
PIF_VALUE: 12
PIF_VALUE: 13
PIF_VALUE: 15
PIF_VALUE: 15
PIF_VALUE: 13
PIF_VALUE: 12
PIF_VALUE: 15
PIF_VALUE: 14
PIF_VALUE: 15
PIF_VALUE: 12
PIF_VALUE: 13
PIF_VALUE: 15
PIF_VALUE: 12
PIF_VALUE: 16
PIF_VALUE: 13
PIF_VALUE: 14
PIF_VALUE: 15
PIF_VALUE: 14
PIF_VALUE: 6
PIF_VALUE: 15
PIF_VALUE: 15
PIF_VALUE: 13
PIF_VALUE: 15
PIF_VALUE: 14
PIF_VALUE: 12
PIF_VALUE: 14
PIF_VALUE: 13
PIF_VALUE: 15
PIF_VALUE: 15
PIF_VALUE: 14
PIF_VALUE: 1
PIF_VALUE: 15
PIF_VALUE: 14
PIF_VALUE: 14
PIF_VALUE: 15
PIF_VALUE: 13
PIF_VALUE: 15
PIF_VALUE: 14
PIF_VALUE: 13
PIF_VALUE: 14
PIF_VALUE: 13
PIF_VALUE: 15
PIF_VALUE: 15
PIF_VALUE: 14
PIF_VALUE: 12
PIF_VALUE: 15
PIF_VALUE: 14
PIF_VALUE: 15
PIF_VALUE: 14
PIF_VALUE: 13
PIF_VALUE: 12
PIF_VALUE: 14
PIF_VALUE: 14
PIF_VALUE: 15
PIF_VALUE: 14
PIF_VALUE: 14
PIF_VALUE: 12
PIF_VALUE: 14
PIF_VALUE: 15
PIF_VALUE: 14
PIF_VALUE: 15
PIF_VALUE: 15
PIF_VALUE: 14
PIF_VALUE: 12
PIF_VALUE: 12
PIF_VALUE: 14
PIF_VALUE: 15
PIF_VALUE: 15
PIF_VALUE: 14
PIF_VALUE: 15
PIF_VALUE: 12
PIF_VALUE: 14
PIF_VALUE: 14
PIF_VALUE: 12
PIF_VALUE: 14
PIF_VALUE: 14
PIF_VALUE: 15
PIF_VALUE: 14
PIF_VALUE: 15
PIF_VALUE: 0
PIF_VALUE: 0
PIF_VALUE: 12
PIF_VALUE: 14
PIF_VALUE: 14
PIF_VALUE: 15
PIF_VALUE: 14
PIF_VALUE: 12
PIF_VALUE: 14
PIF_VALUE: 15
PIF_VALUE: 14
PIF_VALUE: 15
PIF_VALUE: 14
PIF_VALUE: 15
PIF_VALUE: 14
PIF_VALUE: 15
PIF_VALUE: 13
PIF_VALUE: 15
PIF_VALUE: 15
PIF_VALUE: 14
PIF_VALUE: 12
PIF_VALUE: 12
PIF_VALUE: 15
PIF_VALUE: 14
PIF_VALUE: 15
PIF_VALUE: 15
PIF_VALUE: 14
PIF_VALUE: 14
PIF_VALUE: 17
PIF_VALUE: 15
PIF_VALUE: 14
PIF_VALUE: 12
PIF_VALUE: 15
PIF_VALUE: 2
PIF_VALUE: 12
PIF_VALUE: 13
PIF_VALUE: 25
PIF_VALUE: 14
PIF_VALUE: 14
PIF_VALUE: 17
PIF_VALUE: 15
PIF_VALUE: 12
PIF_VALUE: 15
PIF_VALUE: 15
PIF_VALUE: 0
PIF_VALUE: 14
PIF_VALUE: 14
PIF_VALUE: 15
PIF_VALUE: 15
PIF_VALUE: 12
PIF_VALUE: 14
PIF_VALUE: 15
PIF_VALUE: 15
PIF_VALUE: 14
PIF_VALUE: 6
PIF_VALUE: 15
PIF_VALUE: 14
PIF_VALUE: 19
PIF_VALUE: 14
PIF_VALUE: 15
PIF_VALUE: 14
PIF_VALUE: 12
PIF_VALUE: 15
PIF_VALUE: 15
PIF_VALUE: 12
PIF_VALUE: 14
PIF_VALUE: 16
PIF_VALUE: 14
PIF_VALUE: 15

## 2019-05-16 ASSESSMENT — PAIN - FUNCTIONAL ASSESSMENT: PAIN_FUNCTIONAL_ASSESSMENT: 0-10

## 2019-05-16 ASSESSMENT — PAIN SCALES - GENERAL
PAINLEVEL_OUTOF10: 0

## 2019-05-16 NOTE — ANESTHESIA POSTPROCEDURE EVALUATION
Department of Anesthesiology  Postprocedure Note    Patient: Jacinto Ramos  MRN: [id number]  YOB: 1990  Date of evaluation: 5/16/2019  Time:  11:31 AM     Procedure Summary     Date:  05/16/19 Room / Location:  [facility] / [facility]    Anesthesia Start:  0725 Anesthesia Stop:  0715    Procedure:  LEFT FOOT LAPIDUS BUNIONECTOMY AND 2ND METATARSAL 2ND DIGIT ARTHROPLASTY, RIGHT 2ND METATARSAL CONDYLECTOMY (Bilateral ) Diagnosis:  (DX HAV LEFT, BENIGN NEOPLASM)    Surgeon:  Marina Vaughn DPM Responsible Provider:  Tracey Scherer MD    Anesthesia Type:  general ASA Status:  2          Anesthesia Type: general    Yudith Phase I: Yudith Score: 9    Yudith Phase II:      Last vitals: Reviewed and per EMR flowsheets.        Anesthesia Post Evaluation    Complications: no

## 2019-05-16 NOTE — H&P
History and Physical Update    Pt Name: Gosia House  MRN: [id number]  [place]: 1990  Date of evaluation: 5/16/2019      [x] I have reviewed the H&P by JERRI Melo CNP dated 5/14/19 in Epic which meets the criteria for an Interval History and Physical note and is attached below. [x] I have examined  Gosia House  There are no changes to the patient who is scheduled for a Left foot lapidus weil 2nd metatarsal , 2nd arthroplasty, right 2nd metatrsal condylectomy by Dr Radha Francois for left HAV, benign neoplasm. The patient denies health changes, fever, chills, productive cough, SOB, chest pain, open sores or wounds. Vital signs: /77   Pulse (!) 46   Temp 97.7 °F (36.5 °C) (Oral)   Resp 18   Ht 5' 8\" (1.727 m)   Wt 173 lb (78.5 kg)   SpO2 96%   BMI 26.30 kg/m²     Allergies:  Seasonal    Medications:    Prior to Admission medications    Not on File       This is a 25 y.o. male who is pleasant, cooperative, alert and oriented x3, in no acute distress. Heart: Heart sounds are normal.  HR 46 Bradycardic rate and regular rhythm without symptoms  No murmur, gallop or rub. Lungs: Normal respiratory effort with good air exchange, unlabored and clear to auscultation without wheezes or rales bilaterally   Abdomen: soft, nontender, nondistended with bowel sounds.        Labs:  Recent Labs     05/14/19  1230   HGB 14.6   HCT 42.9   WBC 5.6   MCV 95.3          Priscila Macias APRN, ANP-BC  Electronically signed 5/16/2019 at 6:43 AM        RODNEY Olivier - CNP   Nurse Practitioner   General Surgery   H&P   Cosign Needed   Date of Service:  5/14/2019 12:39 PM               Cosign Needed        Expand All Collapse All       History and Physical Service   [address] [facility]            Date of Evaluation:     5/14/2019  Patient name:              Gosia House  MRN:                           [id number]  YOB: 1990  PCP: Yonas Ruiz MD     History Obtained From:      Patient     History of Present Illness: This is Ady Douglas a 25 y.o. male who presents for a pre-admission testing appointment for an upcoming left foot lapidus weil 2nd metatarsal 2nd arthroplasty, right 2nd metatarsal condylectomy by Dr. Sherri Rodarte scheduled on 05/16/2019 at 0730 due to left HAV, benign neoplasm. The patient's chief complaint is constant, 4-9/10 bilateral foot pain. Right foot pain has been present for 4-5 years and the left foot pain started 7 months ago. Pain is aggravated by walking and wearing shoes. Pain is minimally relieved with rest.  Pt denies taking pain medication. Numbness and tingling in the right hallux and right 2nd toe. Right 3rd toe edema. Pt reports a nail punctured his right foot in 2809-3286. S/p right bunionectomy in 2017. Denies recent falls and injuries. Past Medical History:      Past Medical History        Past Medical History:   Diagnosis Date    Diarrhea       Resolved (Written 05/14/2019)    Eczema      Poor historian         Pt denies cardiac disease, respiratory disease, and diabetes. Past Surgical History:      Past Surgical History         Past Surgical History:   Procedure Laterality Date    BUNIONECTOMY Right 12/1/2017     FOOT BUNIONECTOMY OLIVER / HAMMERTOE REPAIR 2ND TOE & MPJ RELEASE 2ND METATARSAL performed by Odette Louise DPM at [address] Right       removed part of foreign body            Medications Prior to Admission:      Home Medications   Prior to Admission medications    Not on File            Allergies:      Seasonal     Social History:      Tobacco:    reports that he has been smoking cigarettes. He started smoking about 6 years ago. He has been smoking about 0.00 packs per day for the past 4.00 years. He has never used smokeless tobacco.  Alcohol:      has no alcohol history on file.   Drug Use:  reports that he has current or past drug history. Drug: Marijuana. Frequency: 7.00 times per week. Instructed pt not to smoke the day of surgery. Pt voiced understanding. Functional Capacity:              1) Pt is able to walk 2 city blocks or more on level ground without SOB. 2) Pt is able to climb 2 flights of stairs without SOB. 3) Pt is able to walk up a hill for 1-2 city blocks without SOB. Family History:      Family History   Family History   Adopted: Yes   Family history unknown: Yes            Review of Systems:      Positive and Negative as described in HPI. CONSTITUTIONAL: Negative for fevers, chills, sweats, fatigue, and weight loss. HEENT:  Negative for glasses, hearing changes, rhinorrhea, and throat pain. RESPIRATORY: Denies asthma, COPD, and sleep apnea. Negative for shortness of breath, cough, congestion, and wheezing. CARDIOVASCULAR: Negative for chest pain, blood clot, irregular heartbeat, and palpitations. GASTROINTESTINAL: Negative for reflux, nausea, vomiting, diarrhea, constipation, change in bowel habits, and abdominal pain. GENITOURINARY: Negative for difficulty of urination, burning with urination, and frequency. INTEGUMENT: Negative for rash, skin lesions, and easy bruising. HEMATOLOGIC/LYMPHATIC: See HPI. ALLERGIC/IMMUNOLOGIC: Negative for urticaria and itching. ENDOCRINE: Negative for diabetes, increase in thirst, increase in urination, and heat or cold intolerance. MUSCULOSKELETAL: See HPI. NEUROLOGICAL: Numbness and tingling in the right hallux and the right 2nd toe. Negative for headaches, dizziness, and lightheadedness. [place] BEHAVIOR/PSYCH: Anxiety. Negative for depression. Physical Exam:   /74   Pulse 60   Resp 16   Ht 5' 8\" (1.727 m)   Wt 173 lb 9.6 oz (78.7 kg)   SpO2 100%   BMI 26.40 kg/m²        General Appearance:  Alert, well appearing, and in no acute distress. Mental status:  Oriented to person, place, and time.   Head:  Normocephalic and atraumatic. Eye: Right lazy eye. No icterus, redness, pupils equal and reactive, left extraocular eye movements intact, and conjunctiva clear. Ear:  Hearing grossly intact. Nose:  No drainage noted. Mouth:  Airway is patent. Mucous membranes moist.  Neck:  Supple and no carotid bruits noted. Lungs:  Bilateral equal air entry, clear to auscultation, no wheezing, rales or rhonchi, and normal effort. Cardiovascular:  Normal rate, regular rhythm, no murmur, gallop, or rub. Abdomen:  Soft, non-tender, non-distended, and active bowel sounds. Neurologic:  Normal speech and cranial nerves II, V, VII through XII grossly intact. Strength 5/5 bilaterally. Skin: Multiple tattoos. No gross lesions, rashes, bruising, or bleeding on exposed skin area. Extremities:  Posterior tibial pulses 2+ bilaterally. No calf tenderness with palpation. Psych:  Normal affect.       Investigations:       Laboratory Testing:  Recent Results         Recent Results (from the past 24 hour(s))   CBC Auto Differential     Collection Time: 05/14/19 12:30 PM   Result Value Ref Range     WBC 5.6 3.5 - 11.3 k/uL     RBC 4.50 4.21 - 5.77 m/uL     Hemoglobin 14.6 13.0 - 17.0 g/dL     Hematocrit 42.9 40.7 - 50.3 %     MCV 95.3 82.6 - 102.9 fL     MCH 32.4 25.2 - 33.5 pg     MCHC 34.0 28.4 - 34.8 g/dL     RDW 13.0 11.8 - 14.4 %     Platelets 360 236 - 543 k/uL     MPV 11.6 8.1 - 13.5 fL     NRBC Automated 0.0 0.0 per 100 WBC     Differential Type NOT REPORTED       Seg Neutrophils 53 36 - 65 %     Lymphocytes 33 24 - 43 %     Monocytes 10 3 - 12 %     Eosinophils % 4 1 - 4 %     Basophils 0 0 - 2 %     Immature Granulocytes 0 0 %     Segs Absolute 2.93 1.50 - 8.10 k/uL     Absolute Lymph # 1.84 1.10 - 3.70 k/uL     Absolute Mono # 0.55 0.10 - 1.20 k/uL     Absolute Eos # 0.20 0.00 - 0.44 k/uL     Basophils # <0.03 0.00 - 0.20 k/uL     Absolute Immature Granulocyte 0.02 0.00 - 0.30 k/uL     WBC Morphology NOT REPORTED       RBC Morphology NOT REPORTED       Platelet Estimate NOT REPORTED                  Recent Labs     05/14/19  1230   HGB 14.6   HCT 42.9   WBC 5.6   MCV 95.3      Diagnosis:       1. Left HAV, benign neoplasm     Plans:      1.  Left foot lapidus weil 2nd metatarsal 2nd arthroplasty, right 2nd metatarsal condylectomy        RODNEY Perla CNP  5/14/2019  12:51 PM                   ·   Pre-Admission Testing Visit on 5/14/2019   ·     ·   Revision & Routing History   ·     ·   Detailed Report   ·     H&P Info     Author Note Status Last Update User   RODNEY Perla CNP Cosign Needed RODNEY Perla CNP   Last Update Date/Time: 5/14/2019  1:27 PM   Chart Review Routing History     Recipient Method Report Sent By   Magy Mack MD   Phone: [phone]    In [address] [employer] Routed Notes RODNEY Perla CNP   Sent: 5/14/2019  1:27 PM   Filed: 05/14/2019   Magy Mack MD   Phone: [phone]    In [address] Routed Notes RODNEY Perla CNP   Sent: 5/14/2019  1:17 PM   Filed: 05/14/2019   Magy Mack MD   Phone: [phone]    In [address] Routed Notes RODNEY Prela CNP   Sent: 5/14/2019  1:08 PM   Filed: 05/14/2019   Magy Mack MD   Phone: [phone]    In [address] Routed Notes RODNEY Perla CNP   Sent: 5/14/2019  1:07 PM   Filed: 05/14/2019

## 2019-05-16 NOTE — BRIEF OP NOTE
PODIATRY BRIEF OP NOTE    PATIENT NAME: Loras Landau  YOB: 1990  -  25 y.o. male  MRN: [id number]  DATE: 5/16/2019  BILLING #: [id number]    Surgeon(s):  Lexa Cho DPM     ASSISTANTS: Kathlyne Curling, DPM PGY2 & Rick Johnson MS#    PRE-OP DIAGNOSIS:   1. Hallux abductovalgus, left foot  2. 2nd digit hammertoe, left foot  3. 2nd digit hammertoe, right foot  4. Bilateral foot pain    POST-OP DIAGNOSIS: Same as above    PROCEDURE:   1. Lapidus bunionectomy with 2 screw fixation, left foot  2. Application posterior splint, LLE  3. Weil osteotomy with 1 screw fixation of 2nd metatarsal, left foot  4. Weil osteotomy with 1 screw fixation of 2nd metatarsal, right foot     ANESTHESIA: General with local 20 cc 1:1 mix 1% lidocaine plain & 0.5% marcaine plain (5cc R foot/15cc L foot)    HEMOSTASIS: Pneumatic ankle tourniquet @ 250 mmHg for 120 minutes (L) & 30 minutes (R)    ESTIMATED BLOOD LOSS: Less than 10 cc    MATERIALS: 3-0 PDS, 4-0 Monocryl, 4-0 Nylon  Implant Name Type Inv.  Item Serial No.  Lot No. LRB No. Used   SCREW ROSSANA ASNIS MICRO [id number] Screw/Plate/Nail/Neal SCREW ROSSANA ASNIS MICRO [id number]  [employer]: [employer]  Left 1   SCREW ROSSANA 3.0 TIT Screw/Plate/Nail/Neal SCREW ROSSANA 3.0 TIT  [employer]: [employer]  Left 1   IMPL KWIRE 2.0MM Screw/Plate/Nail/Neal IMPL KWIRE 2.0MM  [employer]: [employer]  Left 2   IMPL KWIRE 1.2 F/3.0 TIT SCREW Screw/Plate/Nail/Neal IMPL KWIRE 1.2 F/3.0 TIT SCREW  [employer]: [employer]  Left 2   SCREW ROSSANA ASNIS MICRO [id number] Screw/Plate/Nail/Neal SCREW ROSSANA ASNIS MICRO [id number]  [employer]: ORTH[employer]  Left 1   IMPL KWIRE .8 F/2.0 TIT SCREW Screw/Plate/Nail/Neal IMPL KWIRE .8 F/2.0 TIT SCREW  [employer]: [employer]  Left 1   IMPL KWIRE .8 F/2.0 TIT SCREW Screw/Plate/Nail/Neal IMPL KWIRE .8 F/2.0 TIT SCREW  [employer]: ORTH[employer]  Right 1   SCREW ROSSANA ASNIS MICRO [id number] Screw/Plate/Nail/Neal SCREW ROSSANA ASNIS MICRO [id number]  [employer]: [employer]TH[employer]  Right 1 INJECTABLES: 10 cc 0.5% marcaine plain post-op in L foot    SPECIMEN: None    COMPLICATIONS: None    FINDINGS: Consistent with above dx    Maggi Harden, [facility]   5/16/2019 at 10:39 AM

## 2019-05-16 NOTE — PROGRESS NOTES
Nicolas Rodrigues requires elevating legrest due to s/p bilateral foot surgery. Nicolas Rodrigues was evaluated today and a DME order was entered for a standard walker because he requires this to successfully complete daily living tasks of ambulating. A standard walker is necessary due to the patient's impaired ambulation and mobility restrictions and he can ambulate only by using a walker instead of a lesser assistive device, such as a cane or crutch. The need for this equipment was discussed with the patient and he understands and is in agreement.      Thanh Betancourt DPM

## 2019-05-16 NOTE — ANESTHESIA PRE PROCEDURE
Department of Anesthesiology  Preprocedure Note       Name:  Duke Mcfadden   Age:  25 y.o.  :  1990                                          MRN:  [id number]         Date:  2019      Surgeon: Suraj Summers):  Lee Bunch DPM    Procedure: LEFT FOOT LAPIDUS WEIL 2ND METATARSAL 2ND ARTHROPLASTY, RIGHT 2ND METATARSAL CONDYLECTOMY- ROMEO (Bilateral )    Medications prior to admission:   Prior to Admission medications    Not on File       Current medications:    Current Facility-Administered Medications   Medication Dose Route Frequency Provider Last Rate Last Dose    [START ON 2019] 0.9 % sodium chloride infusion   Intravenous Continuous Hamilton MADDIE Cho, DO        [START ON 2019] lactated ringers infusion   Intravenous Continuous Gil Ganser,  125 mL/hr at 19 1236      sodium chloride flush 0.9 % injection 10 mL  10 mL Intravenous 2 times per day Hamilton MADDIE Cho, DO        sodium chloride flush 0.9 % injection 10 mL  10 mL Intravenous PRN Gil Ganser, DO        [START ON 2019] lidocaine PF 1 % injection 1 mL  1 mL Intradermal Once PRN Hamilton Cho, DO        ceFAZolin (ANCEF) 2 g in dextrose 3 % 50 mL IVPB (duplex)  2 g Intravenous Once Lee Bunch DPM           Allergies:     Allergies   Allergen Reactions    Seasonal        Problem List:    Patient Active Problem List   Diagnosis Code    Hallux abductovalgus with bunions, right M20.11, M21.611    Hammertoe of second toe of right foot M20.41    Metatarsalgia of right foot M77.41       Past Medical History:        Diagnosis Date    Diarrhea     Resolved (Written 2019)    Eczema     Lazy eye, right     Poor historian        Past Surgical History:        Procedure Laterality Date    BUNIONECTOMY Right 2017    FOOT BUNIONECTOMY OLIVER / HAMMERTOE REPAIR 2ND TOE & MPJ RELEASE 2ND METATARSAL performed by Jori Marinelli DPM at [place] [address] Right     removed part of foreign body Social History:    Social History     Tobacco Use    Smoking status: Current Every Day Smoker     Packs/day: 0.00     Years: 4.00     Pack years: 0.00     Types: Cigarettes     Start date: 11/17/2012    Smokeless tobacco: Never Used    Tobacco comment: 3-4 cig. a day   Substance Use Topics    Alcohol use: Not on file     Comment: Rare                                Ready to quit: Not Answered  Counseling given: Not Answered  Comment: 3-4 cig. a day      Vital Signs (Current):   Vitals:    05/16/19 0619 05/16/19 0621   BP:  136/77   Pulse:  (!) 46   Resp: 20 18   Temp:  97.7 °F (36.5 °C)   TempSrc: Oral Oral   SpO2: 100% 96%   Weight: 173 lb (78.5 kg)    Height: 5' 8\" (1.727 m)                                               BP Readings from Last 3 Encounters:   05/16/19 136/77   05/14/19 137/74   01/28/19 115/81       NPO Status: Time of last liquid consumption: 2100                        Time of last solid consumption: 2100                        Date of last liquid consumption: 05/15/19                        Date of last solid food consumption: 05/15/19    BMI:   Wt Readings from Last 3 Encounters:   05/16/19 173 lb (78.5 kg)   05/14/19 173 lb 9.6 oz (78.7 kg)   04/29/19 170 lb (77.1 kg)     Body mass index is 26.3 kg/m².     CBC:   Lab Results   Component Value Date    WBC 5.6 05/14/2019    RBC 4.50 05/14/2019    HGB 14.6 05/14/2019    HCT 42.9 05/14/2019    MCV 95.3 05/14/2019    RDW 13.0 05/14/2019     05/14/2019       CMP:   Lab Results   Component Value Date     01/24/2019    K 4.2 01/24/2019     01/24/2019    CO2 20 01/24/2019    BUN 16 01/24/2019    CREATININE 0.94 01/24/2019    GFRAA >60 01/24/2019    LABGLOM >60 01/24/2019    GLUCOSE 93 01/24/2019    PROT 6.9 01/24/2019    CALCIUM 9.2 01/24/2019    BILITOT 0.38 01/24/2019    ALKPHOS 93 01/24/2019    AST 24 01/24/2019    ALT 24 01/24/2019       POC Tests: No results for input(s): POCGLU, POCNA, POCK, POCCL, POCBUN, POCHEMO, POCHCT in the last 72 hours. Coags: No results found for: PROTIME, INR, APTT    HCG (If Applicable): No results found for: PREGTESTUR, PREGSERUM, HCG, HCGQUANT     ABGs: No results found for: PHART, PO2ART, OSI3GUR, ADV0VWZ, BEART, T4LDUXNQ     Type & Screen (If Applicable):  No results found for: LABABO, LABRH    Anesthesia Evaluation    Airway: Mallampati: I  TM distance: >3 FB   Neck ROM: full  Mouth opening: > = 3 FB Dental:          Pulmonary:                              Cardiovascular:                      Neuro/Psych:               GI/Hepatic/Renal:             Endo/Other:                     Abdominal:           Vascular:                                        Anesthesia Plan      general     ASA 2             Anesthetic plan and risks discussed with patient.                       Faith Puente MD   5/16/2019

## 2019-05-16 NOTE — PROGRESS NOTES
Luke Weaver was evaluated today and a DME order was entered for a wheeled walker with seat because he requires this to successfully complete daily living tasks of ambulating. A wheeled walker with seat is necessary due to the patient's unsteady gait, upper body weakness, inability to  and ambulation device, ambulating only short distances by pushing a walker, and the need to sit for a short time before resuming ambulation. These tasks cannot be completed with a lesser ambulation device such as a cane, crutch, or standard walker. The need for this equipment was discussed with the patient and he understands and is in agreement.       Gabo Kerns DPM

## 2019-05-16 NOTE — OP NOTE
PODIATRY OPERATIVE NOTE     PATIENT NAME: Kelsey Woody  YOB: 1990  -  25 y.o. male  MRN: [id number]  DATE: 5/16/2019  BILLING #: [id number]     Surgeon(s):  Angelica Carrel, DPM      ASSISTANTS: Colleen Alejandro DPM PGY2 & Keagan Ford MS3     PRE-OP DIAGNOSIS:   1. Hallux abductovalgus, left foot  2. 2nd digit hammertoe, left foot  3. 2nd digit hammertoe, right foot  4. Bilateral foot pain     POST-OP DIAGNOSIS: Same as above     PROCEDURE:   1. Lapidus bunionectomy with 2 screw fixation, left foot  2. Application posterior splint, LLE  3. Weil osteotomy with 1 screw fixation of 2nd metatarsal, left foot  4. Weil osteotomy with 1 screw fixation of 2nd metatarsal, right foot      ANESTHESIA: General with local 20 cc 1:1 mix 1% lidocaine plain & 0.5% marcaine plain (5cc R foot/15cc L foot)     HEMOSTASIS: Pneumatic ankle tourniquet @ 250 mmHg for 120 minutes (L) & 30 minutes (R)     ESTIMATED BLOOD LOSS: Less than 10 cc     MATERIALS: 3-0 PDS, 4-0 Monocryl, 4-0 Nylon  Implant Name Type Inv.  Item Serial No.  Lot No. LRB No. Used   SCREW ROSSANA ASNIS MICRO [id number] Screw/Plate/Nail/Neal SCREW ROSSANA ASNIS MICRO 3.0X24MM   [employer]OME[employer]: ORTHOPAEDICS   Left 1   SCREW ROSSANA 3.0 TIT Screw/Plate/Nail/Neal SCREW ROSSANA 3.0 TIT   [employer]: ORTHOPAEDICS   Left 1   IMPL KWIRE 2.0MM Screw/Plate/Nail/Neal IMPL KWIRE 2.0MM   [employer]: ORTHOPA[employer]   Left 2   IMPL KWIRE 1.2 F/3.0 TIT SCREW Screw/Plate/Nail/Neal IMPL KWIRE 1.2 F/3.0 TIT SCREW   [employer]: ORTHOPAED[employer]   Left 2   SCREW ROSSANA ASNIS MICRO [id number] Screw/Plate/Nail/Neal SCREW ROSSANA ASNIS MICRO [id number]   [employer]: ORTHOPAEDICS   Left 1   IMPL KWIRE .8 F/2.0 TIT SCREW Screw/Plate/Nail/Neal IMPL KWIRE .8 F/2.0 TIT SCREW   [employer]OME[employer]: ORTHOPA[employer]   Left 1   IMPL KWIRE .8 F/2.0 TIT SCREW Screw/Plate/Nail/Neal IMPL KWIRE .8 F/2.0 TIT SCREW   [employer]: ORTHOPAEDICS   Right 1   SCREW ROSSANA ASNYOLI MICRO [id number] Screw/Plate/Nail/Neal SCREW ROSSANA ASNYOLI MICRO [id number]   [employer]: ORTHOPAEDICS bunion deformity. Good apposition and alignment of the 1st met and 1st cuneiform was noted using fluoroscopy and a 0.062 k-wire was inserted across the joint for temporary fixation. A [place] 3.0 cannulate screw was inserted via AO technique from distal plantar medial metatarsal to proximal dorsal lateral medial cuneiform. The k-wire was then removed. Next, a [place] 3.0 cannulated screw was inserted from dorsolateral metatarsal to plantar lateral medial cuneiform using AO technique. Fluoro imaging was used to confirm good position/stability. Attention was then directed to medial first metatarsal head, where a sagittal saw was used to resect the medial eminence. Bone was excised and passed to the back table. Rough edges were smoothed with power rasp. Satisfactory HAV correction was noted. Next, attention was directed to the left 2nd metatarsal where a dorsolinear incision was made along the MPJ with a #15 blade. Dissection thru subcutaneous tissue performed with care to retract neurovascular structure and the extensor tendon. Linear capsulotomy was performed, capsule was sharply reflected and collaterals were incised to expose the metatarsal head. A sagittal saw was used to create and osteotomy from distal dorsal metatarsal head articular cartilage parallel to the weightbearing surface. The physiologic position of the MPJ occurred and the position was fixated in the distal metatarsal shaft with a 2.0 cannulated Analy screw using AO technique. Adequate reduction/stability noted. Over-hanging metatarsal head cartilage was excised with rongeur. The right foot was exsanguinated with esmark, elevated and tourniquet inflated to 250 mmHg. Attention was directed to the right 2nd metatarsal where a dorsolinear incision was made along the MPJ with a #15 blade. Dissection thru subcutaneous tissue performed with care to retract neurovascular structure and the extensor tendon.  Linear capsulotomy was performed, capsule was sharply reflected and collaterals were incised to expose the metatarsal head. A sagittal saw was used to create and osteotomy from distal dorsal metatarsal head articular cartilage parallel to the weightbearing surface. The physiologic position of the MPJ occurred and the position was fixated in the distal metatarsal shaft with a 2.0 cannulated [place] screw using AO technique. Adequate reduction/stability noted. Over-hanging metatarsal head cartilage was excised with rongeur. The surgical sites were irrigated copious amounts of normal sterile saline. Adequate hemostasis was noted throughout the surgical beds. The capsule was re-approximated using 3-0 PDS. The subcutaneous tissues were re-approximated using 4-0 Monocryl and skin was re-approximated with 4-0 Nylon. A post-op injection of 10 cc of 0.5% marcaine plain was injected in the left foot. The incisions were dressed with adaptic and a dry sterile dressing consisting of 4x4 gauze, kerlix, and an ACE wrap. The tourniquets were deflated and CFT was noted intact to all digits. A posterior splint was applied to the LLE, post-application CFT intact to digits. Patient tolerated the procedure and the anesthesia well and was transferred to the recovery room with vital signs stable. Following a period of post-operative monitoring the patient will be discharged home. Patient was instructed to follow-up with Dr. Oscar Ramos within one week. Post-operative instructions were given to the patient - Rx Percocet, NWB to LLE with walker/crutches, keep dressing/splint c/d/i, and elevate/ice at rest for edema control.       JASMIN Suarez [facility]   Podiatric Medicine & Surgery

## 2019-05-20 DIAGNOSIS — M21.612 BUNION, LEFT FOOT: Primary | ICD-10-CM

## 2019-05-24 ENCOUNTER — TELEPHONE (OUTPATIENT)
Dept: PODIATRY | Age: 29
End: 2019-05-24

## 2019-05-24 NOTE — TELEPHONE ENCOUNTER
Patient's wife called, said that is bandage is  Dirty and wet. . Informed patients wife that  [facility] hd appointment with us on 5/22/19, patient's said that they completely forgot. Called Dr. Amol Brenner  Asking what should we tell the patient. Per Dr. Amol Brenner the patient can come to our office or go to the ER. Called Jodi  [facility] back patient will come to our office today between 1:00 and 2:00 pm.am

## 2019-05-24 NOTE — TELEPHONE ENCOUNTER
The came in for dressing change. All the pt needed was some extra padding at the top of the cast because it was painful & digging into his calf. I took down the ace wrap, cast off and he immediately said it felt better. So I added two ABD pads to the cast at the top so it wasn't digging into his skin at all. I confirmed that was the only problem. I did not take down the any dressing from there.

## 2019-05-27 ENCOUNTER — APPOINTMENT (OUTPATIENT)
Dept: GENERAL RADIOLOGY | Age: 29
End: 2019-05-27
Payer: MEDICARE

## 2019-05-27 ENCOUNTER — HOSPITAL ENCOUNTER (EMERGENCY)
Age: 29
Discharge: HOME | End: 2019-05-27
Attending: EMERGENCY MEDICINE
Payer: MEDICARE

## 2019-05-27 VITALS
DIASTOLIC BLOOD PRESSURE: 74 MMHG | TEMPERATURE: 98.8 F | SYSTOLIC BLOOD PRESSURE: 131 MMHG | HEIGHT: 68 IN | RESPIRATION RATE: 15 BRPM | BODY MASS INDEX: 27.28 KG/M2 | OXYGEN SATURATION: 100 % | WEIGHT: 180 LBS | HEART RATE: 59 BPM

## 2019-05-27 DIAGNOSIS — S62.399A: Primary | ICD-10-CM

## 2019-05-27 PROCEDURE — 73120 X-RAY EXAM OF HAND: CPT

## 2019-05-27 PROCEDURE — 2580000003 HC RX 258: Performed by: EMERGENCY MEDICINE

## 2019-05-27 PROCEDURE — 73130 X-RAY EXAM OF HAND: CPT

## 2019-05-27 PROCEDURE — 2700000000 HC OXYGEN THERAPY PER DAY

## 2019-05-27 PROCEDURE — 73080 X-RAY EXAM OF ELBOW: CPT

## 2019-05-27 PROCEDURE — 94770 HC ETCO2 MONITOR DAILY: CPT

## 2019-05-27 PROCEDURE — 94762 N-INVAS EAR/PLS OXIMTRY CONT: CPT

## 2019-05-27 PROCEDURE — 94761 N-INVAS EAR/PLS OXIMETRY MLT: CPT

## 2019-05-27 PROCEDURE — 26605 TREAT METACARPAL FRACTURE: CPT

## 2019-05-27 PROCEDURE — 99283 EMERGENCY DEPT VISIT LOW MDM: CPT

## 2019-05-27 PROCEDURE — 6370000000 HC RX 637 (ALT 250 FOR IP): Performed by: PHYSICIAN ASSISTANT

## 2019-05-27 PROCEDURE — 2500000003 HC RX 250 WO HCPCS: Performed by: PHYSICIAN ASSISTANT

## 2019-05-27 PROCEDURE — 73110 X-RAY EXAM OF WRIST: CPT

## 2019-05-27 RX ORDER — SODIUM CHLORIDE 9 MG/ML
INJECTION, SOLUTION INTRAVENOUS CONTINUOUS
Status: DISCONTINUED | OUTPATIENT
Start: 2019-05-27 | End: 2019-05-27 | Stop reason: HOSPADM

## 2019-05-27 RX ORDER — ETOMIDATE 2 MG/ML
10 INJECTION INTRAVENOUS ONCE
Status: DISCONTINUED | OUTPATIENT
Start: 2019-05-27 | End: 2019-05-27 | Stop reason: HOSPADM

## 2019-05-27 RX ORDER — IBUPROFEN 800 MG/1
800 TABLET ORAL EVERY 8 HOURS PRN
Qty: 30 TABLET | Refills: 0 | Status: SHIPPED | OUTPATIENT
Start: 2019-05-27 | End: 2021-11-17

## 2019-05-27 RX ORDER — ETOMIDATE 2 MG/ML
INJECTION INTRAVENOUS DAILY PRN
Status: COMPLETED | OUTPATIENT
Start: 2019-05-27 | End: 2019-05-27

## 2019-05-27 RX ORDER — HYDROCODONE BITARTRATE AND ACETAMINOPHEN 5; 325 MG/1; MG/1
1-2 TABLET ORAL EVERY 8 HOURS PRN
Qty: 15 TABLET | Refills: 0 | Status: SHIPPED | OUTPATIENT
Start: 2019-05-27 | End: 2019-05-30

## 2019-05-27 RX ORDER — HYDROCODONE BITARTRATE AND ACETAMINOPHEN 5; 325 MG/1; MG/1
1 TABLET ORAL ONCE
Status: COMPLETED | OUTPATIENT
Start: 2019-05-27 | End: 2019-05-27

## 2019-05-27 RX ADMIN — ETOMIDATE 5 MG: 2 INJECTION, SOLUTION INTRAVENOUS at 13:24

## 2019-05-27 RX ADMIN — ETOMIDATE 2.5 MG: 2 INJECTION, SOLUTION INTRAVENOUS at 13:20

## 2019-05-27 RX ADMIN — HYDROCODONE BITARTRATE AND ACETAMINOPHEN 1 TABLET: 5; 325 TABLET ORAL at 11:25

## 2019-05-27 RX ADMIN — SODIUM CHLORIDE 1000 ML: 9 INJECTION, SOLUTION INTRAVENOUS at 13:17

## 2019-05-27 RX ADMIN — ETOMIDATE 2.5 MG: 2 INJECTION, SOLUTION INTRAVENOUS at 13:22

## 2019-05-27 ASSESSMENT — PAIN SCALES - GENERAL
PAINLEVEL_OUTOF10: 5
PAINLEVEL_OUTOF10: 10
PAINLEVEL_OUTOF10: 10
PAINLEVEL_OUTOF10: 8
PAINLEVEL_OUTOF10: 8

## 2019-05-27 ASSESSMENT — PAIN DESCRIPTION - DESCRIPTORS: DESCRIPTORS: CONSTANT;THROBBING;SHARP

## 2019-05-27 ASSESSMENT — PAIN DESCRIPTION - ORIENTATION: ORIENTATION: RIGHT

## 2019-05-27 ASSESSMENT — PAIN DESCRIPTION - LOCATION: LOCATION: HAND

## 2019-05-27 ASSESSMENT — PAIN SCALES - WONG BAKER: WONGBAKER_NUMERICALRESPONSE: 10

## 2019-05-27 ASSESSMENT — PAIN DESCRIPTION - FREQUENCY: FREQUENCY: CONTINUOUS

## 2019-05-27 NOTE — ED PROVIDER NOTES
[address] [place] [address] ED  [facility]      Pt Name: Gosia House  MRN: [id number]  [place] 1990  Date ofevaluation: 5/27/2019  Provider: Purvi Barros Dr       Chief Complaint   Patient presents with    Hand Injury     right fell today         HISTORY OF PRESENT ILLNESS  (Location/Symptom, Timing/Onset, Context/Setting, Quality, Duration, Modifying Factors, Severity.)   Gosia House is a 25 y.o. male who presents to the emergency department with right hand and wrist pain status post punching the ground today. Patient has braces and splints on both feet status post recent foot surgery. Injury occurred just prior to arrival.  Pain is mild to moderate, constant, throbbing      Nursing Notes were reviewed. ALLERGIES     Seasonal    CURRENT MEDICATIONS       Discharge Medication List as of 5/27/2019  2:51 PM          PAST MEDICAL HISTORY         Diagnosis Date    Diarrhea     Resolved (Written 05/14/2019)    Eczema     Lazy eye, right     Poor historian        SURGICAL HISTORY           Procedure Laterality Date    BUNIONECTOMY Right 12/1/2017    FOOT BUNIONECTOMY OLIVER / HAMMERTOE REPAIR 2ND TOE & MPJ RELEASE 2ND METATARSAL performed by Yamile Cisse DPM at [address] [place] [address] Bilateral 5/16/2019    LEFT FOOT LAPIDUS BUNIONECTOMY AND 2ND METATARSAL 2ND DIGIT ARTHROPLASTY, RIGHT 2ND METATARSAL CONDYLECTOMY performed by Radha Francois DPM at [facility]     removed part of foreign body    FOOT SURGERY Bilateral 05/16/2019     LEFT FOOT LAPIDUS BUNIONECTOMY AND 2ND METATARSAL 2ND DIGIT ARTHROPLASTY, RIGHT 2ND METATARSAL CONDYLECTOMY (Bilateral )         FAMILY HISTORY           Adopted: Yes   Family history unknown: Yes     No family status information on file. SOCIAL HISTORY      reports that he has been smoking cigarettes. He started smoking about 6 years ago.  He has been smoking about 0.00 packs per day for the past 4.00 years. He has never used smokeless tobacco. He reports that he has current or past drug history. Drug: Marijuana. Frequency: 7.00 times per week. REVIEW OFSYSTEMS    (2-9 systems for level 4, 10 or more for level 5)   Review of Systems    Except as noted above the remainder of the review of systems was reviewed and negative. PHYSICAL EXAM    (up to 7 for level 4, 8 or more for level 5)     ED Triage Vitals [05/27/19 1111]   BP Temp Temp Source Pulse Resp SpO2 Height Weight   121/82 98.8 °F (37.1 °C) Oral 68 16 99 % 5' 8\" (1.727 m) 180 lb (81.6 kg)      Physical Exam   Constitutional: He is oriented to person, place, and time. He appears well-developed. HENT:   Head: Normocephalic and atraumatic. Eyes: EOM are normal.   Neck: Normal range of motion. Neck supple. Musculoskeletal: Normal range of motion. Right hand: He exhibits tenderness, bony tenderness and deformity. Hands:  Neurological: He is alert and oriented to person, place, and time. Skin: Skin is warm. Psychiatric: He has a normal mood and affect. His behavior is normal.               DIAGNOSTIC RESULTS     EKG: All EKG's are interpreted by the Emergency Department Physician who either signs or Co-signs this chart in the absence of a cardiologist.        RADIOLOGY:   Non-plain film images such as CT, Ultrasound and MRI are read by the radiologist. Plain radiographic images arevisualized and preliminarily interpreted by the emergency physician with the below findings:        Interpretation per the Radiologist below, if available at thetime of this note:          ED BEDSIDE ULTRASOUND:   Performed by ED Physician - none    LABS:  Labs Reviewed - No data to display    All other labs were within normal range or not returned as of this dictation.     EMERGENCY DEPARTMENT COURSE and DIFFERENTIAL DIAGNOSIS/MDM:   Vitals:    Vitals:    05/27/19 1348 05/27/19 1357 05/27/19 1402 05/27/19 1418   BP: 131/88 130/87 (!) 145/81 131/74   Pulse: 60 59 64 59   Resp: 16 17 17 15   Temp:       TempSrc:       SpO2: 100% 100% 100%    Weight:       Height:           X-rays show fracture and dislocation of the third fourth and fifth metacarpals. This was reduced without difficulty under conscious sedation. Patient given a sugar tone splint and discharged home with orthopedic referral and pain medication. CONSULTS:  None    PROCEDURES:  Ortho Injury  Date/Time: 5/27/2019 3:01 PM  Performed by: Nereyda Carlos PA-C  Authorized by: Lexie Ashby MD   Consent: Verbal consent not obtained. Consent given by: patient  Patient understanding: patient states understanding of the procedure being performed  Patient consent: the patient's understanding of the procedure matches consent given  Patient identity confirmed: verbally with patient and arm band  Injury location: hand  Location details: right hand  Injury type: fracture-dislocation  Pre-procedure neurovascular assessment: neurovascularly intact  Pre-procedure distal perfusion: normal  Pre-procedure neurological function: normal  Pre-procedure range of motion: reduced    Sedation:  Patient sedated: yes  Sedatives: etomidate    Manipulation performed: yes  Skin traction used: yes  Reduction successful: yes  X-ray confirmed reduction: yes  Immobilization: splint  Splint type: sugar tong  Supplies used: ocl. Post-procedure neurovascular assessment: post-procedure neurovascularly intact  Post-procedure distal perfusion: normal  Post-procedure neurological function: normal  Post-procedure range of motion: unchanged  Patient tolerance: Patient tolerated the procedure well with no immediate complications              FINAL IMPRESSION      1.  Closed traumatic displaced fracture of multiple metacarpal bones, initial encounter          DISPOSITION/PLAN   DISPOSITION Decision To Discharge 05/27/2019 02:48:18 PM      PATIENTREFERRED TO:   Anjelica Esparza [facility] [address] [place] [address]  [phone]            DISCHARGE MEDICATIONS:     Discharge Medication List as of 5/27/2019  2:51 PM      START taking these medications    Details   HYDROcodone-acetaminophen (NORCO) 5-325 MG per tablet Take 1-2 tablets by mouth every 8 hours as needed for Pain for up to 3 days. , Disp-15 tablet, R-0Print      ibuprofen (ADVIL;MOTRIN) 800 MG tablet Take 1 tablet by mouth every 8 hours as needed for Pain, Disp-30 tablet, R-0Print                 (Please note that portions of this note were completed with a voice recognition program.  Efforts were made to edit thedictations but occasionally words are mis-transcribed.)    GABRIELE Melendrez PA-C  05/27/19 2141

## 2019-05-27 NOTE — ED PROVIDER NOTES
neurovascular intact.            Complications: none    Total conscious sedation time 15 minutes       [place]illa Park MD  05/27/19 1420

## 2019-05-27 NOTE — ED NOTES
Pt presents to ED via wheelchair c/o of injury to right hand. Pt states he fell today while using his crutches. Pt rates pain 10/10. No swelling or bruising noted. Deformity noted.  Palp radial pulse with cap refill less than 3 seconds       Rafael Werner RN  05/27/19 5831

## 2019-05-29 ENCOUNTER — OFFICE VISIT (OUTPATIENT)
Dept: PODIATRY | Age: 29
End: 2019-05-29

## 2019-05-29 ENCOUNTER — OFFICE VISIT (OUTPATIENT)
Dept: ORTHOPEDIC SURGERY | Age: 29
End: 2019-05-29
Payer: MEDICARE

## 2019-05-29 VITALS
HEIGHT: 68 IN | HEART RATE: 68 BPM | DIASTOLIC BLOOD PRESSURE: 86 MMHG | BODY MASS INDEX: 27.26 KG/M2 | SYSTOLIC BLOOD PRESSURE: 130 MMHG | WEIGHT: 179.9 LBS

## 2019-05-29 VITALS — HEIGHT: 68 IN | RESPIRATION RATE: 16 BRPM | BODY MASS INDEX: 25.76 KG/M2 | WEIGHT: 170 LBS

## 2019-05-29 DIAGNOSIS — S62.309A CLOSED FRACTURE OF MULTIPLE METACARPAL BONES, INITIAL ENCOUNTER: Primary | ICD-10-CM

## 2019-05-29 DIAGNOSIS — Z98.890 POST-OPERATIVE STATE: Primary | ICD-10-CM

## 2019-05-29 PROCEDURE — 99203 OFFICE O/P NEW LOW 30 MIN: CPT | Performed by: PHYSICIAN ASSISTANT

## 2019-05-29 PROCEDURE — 99024 POSTOP FOLLOW-UP VISIT: CPT | Performed by: PODIATRIST

## 2019-05-29 ASSESSMENT — ENCOUNTER SYMPTOMS
APNEA: 0
SHORTNESS OF BREATH: 0
NAUSEA: 0
DIARRHEA: 0
COUGH: 0
CONSTIPATION: 0
ABDOMINAL DISTENTION: 0
CHEST TIGHTNESS: 0
VOMITING: 0
COLOR CHANGE: 0
ABDOMINAL PAIN: 0

## 2019-05-29 NOTE — PROGRESS NOTES
[facility]  [facility] [address]  [address] [place] [address]  Dept: [phone]  Dept Fax: [phone]    POST-OP PROGRESS NOTE  Date of patient's visit: 5/29/2019  Patient's Name:  Loras Landau YOB: 1990            Patient Care Team:  Cristino Kelley MD as PCP - General (Family Medicine)  Cristino Kelley MD as PCP - S Attributed Provider  Lexa Cho DPM as Physician (Podiatry)  Aubrie Baker DPM as Physician (Podiatry)        Chief Complaint   Patient presents with    Post-Op Check     2 wk bunionectomy       Pt's primary care physician is Anika Egan MD last seen 1/28/19    Subjective: Loras Landau is a 25 y.o. male who presents to the office today 2week(s)  S/P right foot bunionectomy and 2nd metatarsal arthroscopy, right 2nd metatarsal condylectomy bilataeral for correction of bunion. He relates he has been walking on the splint. Also relates to punching a wall and broke his hand. Pt is going for hand surgery this Friday. Problem List Items Addressed This Visit     None      Visit Diagnoses     Post-operative state    -  Primary      . Patient relates pain is Present . Pain is rated 5 out of 10 and is described as intermittent. Currently denies F/C/N/V. Is patient taking pain medications as prescribed and is controlling pain    Physical Examination:  Incision is coapted, sutures/steri-strips are intact. Minimal bleeding post operatively. Edema present. No erythema. No Pus. Operative correction is satisfactory. Assessment: Loras Landau is status post as above  Normal post operative course. Doing well          ICD-10-CM    1. Post-operative state Z98.890          Plan:  Patient examined and evaluated. Current condition and treatment options discussed in detail. Advised pt to remain nonweightbearing to LLE. Applied new splint to LLE. Will get xrays at his 3 week post op appoint.  Verbal and written instructions given to patient. Orders: No orders of the defined types were placed in this encounter. Contact office with any questions/problems/concerns. RTC in 1week(s).      Electronically signed by Stephanie Menjivar DPM on 5/29/2019 at 1:51 PM  5/29/2019

## 2019-05-29 NOTE — PROGRESS NOTES
with the documentation provided by other staff and have reviewed their documentation prior to providing my signature indicating agreement. Vitals:   /86   Pulse 68   Ht 5' 7.99\" (1.727 m)   Wt 179 lb 14.3 oz (81.6 kg)   BMI 27.36 kg/m²  Body mass index is 27.36 kg/m². Physical Examination:     Orthopedics:    GENERAL: Alert and oriented X3 in no acute distress. SKIN: Intact without lesions or ulcerations. NEURO: Musculoskeletal and axillary nerves intact to sensory and motor testing. VASC: Capillary refill is less than 3 seconds. Fracture:    LOCATION: Right Hand   SITE: Distal neurocirculatory status is intact. EXAM: Sensation is intact to light touch, there is full motor function of the extremity. PALP: fracture site is palpated with minimal pain. ROM: hand and fingers are moving well. Assessment:     1. Closed fracture of multiple metacarpal bones, initial encounter        Procedures:    Procedure: no    Radiology:   Xr Elbow Right (min 3 Views)    Result Date: 5/27/2019  EXAMINATION: 3 XRAY VIEWS OF THE RIGHT ELBOW; 3 XRAY VIEWS OF THE RIGHT HAND; 3 XRAY VIEWS OF THE RIGHT WRIST 5/27/2019 11:50 am COMPARISON: None. HISTORY: ORDERING SYSTEM PROVIDED HISTORY: Pain TECHNOLOGIST PROVIDED HISTORY: Pain Ordering Physician Provided Reason for Exam: fell landed on hand to catch self Acuity: Unknown Type of Exam: Unknown 49-year-old male who fell and landed to catch himself; right upper extremity pain FINDINGS: Right elbow: Osseous alignment is normal.  Joint spaces are well maintained. No acute fracture or gross dislocation is seen. The radial head and radial neck appear intact. There is no significant elevation of the posterior fat pad or sail sign to suggest a joint effusion. Mild enthesopathy at the distal triceps tendon insertion.  Right wrist: Soft tissue swelling at the ulnar and dorsal aspect of the hand with dorsal dislocation of the 3rd through 5th metacarpals and intra-articular fractures at the 3rd through 5th carpometacarpal joints, best appreciated on the lateral view. Scaphoid appears intact. No chondrocalcinosis. Right hand: Soft tissue swelling along the ulnar and dorsal aspect of the right hand secondary to fracture dislocations involving the 3rd through 5th carpometacarpal joints best appreciated on the lateral view. Complete dorsal dislocation of the 3rd through 5th proximal metacarpals in relation to the carpus with associated fracture fragments. No marginal erosions. Scaphoid appears intact. Remaining visualized osseous structures appear grossly intact. No chondrocalcinosis. Right elbow: No acute fracture or gross dislocation. Right wrist: Acute fracture dislocations along the 3rd through 5th carpometacarpal joints with ulnar and dorsal soft tissue swelling. Right hand: 1. Acute fracture dislocation involving the 3rd through 5th CMC joints with ulnar and dorsal soft tissue swelling and associated fracture fragments. 2. Complete dorsal dislocation of the 3rd through 5th proximal metacarpals in relation to the carpus. Xr Wrist Right (min 3 Views)    Result Date: 5/27/2019  EXAMINATION: 3 XRAY VIEWS OF THE RIGHT ELBOW; 3 XRAY VIEWS OF THE RIGHT HAND; 3 XRAY VIEWS OF THE RIGHT WRIST 5/27/2019 11:50 am COMPARISON: None. HISTORY: ORDERING SYSTEM PROVIDED HISTORY: Pain TECHNOLOGIST PROVIDED HISTORY: Pain Ordering Physician Provided Reason for Exam: fell landed on hand to catch self Acuity: Unknown Type of Exam: Unknown 43-year-old male who fell and landed to catch himself; right upper extremity pain FINDINGS: Right elbow: Osseous alignment is normal.  Joint spaces are well maintained. No acute fracture or gross dislocation is seen. The radial head and radial neck appear intact. There is no significant elevation of the posterior fat pad or sail sign to suggest a joint effusion. Mild enthesopathy at the distal triceps tendon insertion.  Right wrist: Soft tissue swelling at the ulnar and dorsal aspect of the hand with dorsal dislocation of the 3rd through 5th metacarpals and intra-articular fractures at the 3rd through 5th carpometacarpal joints, best appreciated on the lateral view. Scaphoid appears intact. No chondrocalcinosis. Right hand: Soft tissue swelling along the ulnar and dorsal aspect of the right hand secondary to fracture dislocations involving the 3rd through 5th carpometacarpal joints best appreciated on the lateral view. Complete dorsal dislocation of the 3rd through 5th proximal metacarpals in relation to the carpus with associated fracture fragments. No marginal erosions. Scaphoid appears intact. Remaining visualized osseous structures appear grossly intact. No chondrocalcinosis. Right elbow: No acute fracture or gross dislocation. Right wrist: Acute fracture dislocations along the 3rd through 5th carpometacarpal joints with ulnar and dorsal soft tissue swelling. Right hand: 1. Acute fracture dislocation involving the 3rd through 5th CMC joints with ulnar and dorsal soft tissue swelling and associated fracture fragments. 2. Complete dorsal dislocation of the 3rd through 5th proximal metacarpals in relation to the carpus. Xr Hand Right (2 Views)    Result Date: 5/27/2019  EXAMINATION: TWO XRAY VIEWS OF THE RIGHT HAND 5/27/2019 1:41 pm COMPARISON: 05/27/2019, 1140 hours HISTORY: ORDERING SYSTEM PROVIDED HISTORY: Post Reduction TECHNOLOGIST PROVIDED HISTORY: Post Reduction Ordering Physician Provided Reason for Exam: Post reduction rt hand Acuity: Unknown Type of Exam: Unknown 49-year-old male with postreduction views of the right hand FINDINGS: Overlying cast/splint material limits evaluation of fine osseous detail. There is improved alignment of the fracture dislocation involving the 3rd through 5th metacarpal bases and CMC joints. No superimposed acute osseous abnormality identified. Mild soft tissue swelling at the dorsum of the hand.      1. Overlying cast/splint material limits evaluation of fine osseous detail. 2. Improved alignment of the fracture dislocation involving the 3rd through 5th metacarpal bases and CMC joints. Mild associated soft tissue swelling at the dorsum of the hand. 3. No superimposed acute osseous abnormality evident. Xr Hand Right (min 3 Views)    Result Date: 5/27/2019  EXAMINATION: 3 XRAY VIEWS OF THE RIGHT ELBOW; 3 XRAY VIEWS OF THE RIGHT HAND; 3 XRAY VIEWS OF THE RIGHT WRIST 5/27/2019 11:50 am COMPARISON: None. HISTORY: ORDERING SYSTEM PROVIDED HISTORY: Pain TECHNOLOGIST PROVIDED HISTORY: Pain Ordering Physician Provided Reason for Exam: fell landed on hand to catch self Acuity: Unknown Type of Exam: Unknown 78-year-old male who fell and landed to catch himself; right upper extremity pain FINDINGS: Right elbow: Osseous alignment is normal.  Joint spaces are well maintained. No acute fracture or gross dislocation is seen. The radial head and radial neck appear intact. There is no significant elevation of the posterior fat pad or sail sign to suggest a joint effusion. Mild enthesopathy at the distal triceps tendon insertion. Right wrist: Soft tissue swelling at the ulnar and dorsal aspect of the hand with dorsal dislocation of the 3rd through 5th metacarpals and intra-articular fractures at the 3rd through 5th carpometacarpal joints, best appreciated on the lateral view. Scaphoid appears intact. No chondrocalcinosis. Right hand: Soft tissue swelling along the ulnar and dorsal aspect of the right hand secondary to fracture dislocations involving the 3rd through 5th carpometacarpal joints best appreciated on the lateral view. Complete dorsal dislocation of the 3rd through 5th proximal metacarpals in relation to the carpus with associated fracture fragments. No marginal erosions. Scaphoid appears intact. Remaining visualized osseous structures appear grossly intact. No chondrocalcinosis.      Right elbow: No acute fracture or gross dislocation. Right wrist: Acute fracture dislocations along the 3rd through 5th carpometacarpal joints with ulnar and dorsal soft tissue swelling. Right hand: 1. Acute fracture dislocation involving the 3rd through 5th CMC joints with ulnar and dorsal soft tissue swelling and associated fracture fragments. 2. Complete dorsal dislocation of the 3rd through 5th proximal metacarpals in relation to the carpus. Plan:   Fracture Treatment : I reviewed the X-ray with the patient and I informed them that the hand had three dislocated hand bones with fractures that will require surgery so he may get the best hand he can possibly have. We discussed the etiologies and natural histories of Closed traumatic fracture dislocation of multiple metacarpal bones in the right hand. We discussed the various treatment alternatives including anti-inflammatory medications, physical therapy, injections, further imaging studies and as a last result surgery. During today's visit, I explained to the patient that he should not be weight bearing on his hand and he should do his best not to weight bear on his feet since he had a surgery on both of them 15 days ago. From there, I told him that we will keep the hospital splint on but shorten it so his fractures will be stable going into surgery on Friday. The patient asked me if he is okay to have a stick so he can itch his arm when he is in the cast. I then told him that he should not stick anything down the cast. The patient then stated that he will not stick anything down his cast and he was just playing. At this time, the patient has opted for returning to the office tomorrow for a pre-op appt with Dr. Gosia Fine. The cast should become fully dry within 26-98 hours of application. The patient was instructed to keep the cast dry at all times and will cover it if there is a possibility of it becoming wet.  The patient was also instructed to not stick anything down the cast to avoid causing

## 2019-05-30 ENCOUNTER — OFFICE VISIT (OUTPATIENT)
Dept: ORTHOPEDIC SURGERY | Age: 29
End: 2019-05-30
Payer: MEDICARE

## 2019-05-30 ENCOUNTER — ANESTHESIA EVENT (OUTPATIENT)
Dept: OPERATING ROOM | Age: 29
End: 2019-05-30
Payer: MEDICARE

## 2019-05-30 VITALS — HEIGHT: 68 IN | WEIGHT: 180 LBS | BODY MASS INDEX: 27.28 KG/M2

## 2019-05-30 DIAGNOSIS — S62.399D: Primary | ICD-10-CM

## 2019-05-30 PROCEDURE — G8427 DOCREV CUR MEDS BY ELIG CLIN: HCPCS | Performed by: ORTHOPAEDIC SURGERY

## 2019-05-30 PROCEDURE — G8419 CALC BMI OUT NRM PARAM NOF/U: HCPCS | Performed by: ORTHOPAEDIC SURGERY

## 2019-05-30 PROCEDURE — 99212 OFFICE O/P EST SF 10 MIN: CPT | Performed by: ORTHOPAEDIC SURGERY

## 2019-05-30 PROCEDURE — 4004F PT TOBACCO SCREEN RCVD TLK: CPT | Performed by: ORTHOPAEDIC SURGERY

## 2019-05-30 ASSESSMENT — ENCOUNTER SYMPTOMS
COLOR CHANGE: 0
RESPIRATORY NEGATIVE: 1
DIARRHEA: 0
SHORTNESS OF BREATH: 0
CONSTIPATION: 0
COUGH: 0
ABDOMINAL PAIN: 0
VOMITING: 0
APNEA: 0
CHEST TIGHTNESS: 0
NAUSEA: 0
ABDOMINAL DISTENTION: 0

## 2019-05-30 NOTE — PROGRESS NOTES
[facility]  [facility] [id number]  Dept: [phone]  Dept Fax: [phone]        Fracture Follow Up      Subjective:     Chief Complaint   Patient presents with    Pre-op Exam     RIGHT  HAND OPEN REDUCTION INTERNAL FIXATION VS CRPP (Right ) DOS 5/30/19       HPI:     Obed Spence is a 25y.o. year old male who presents to our office today regarding his Closed traumatic displaced fracture of multiple metacarpal bones. The injury was caused by a punch to the ground. The date of injury was on 05/27/19. Therefore, we are 2 day(s) post injury. Patient went to [address] [place] [address],[address] ED for initial treatment which included x-ray and was given a splint and a script for Norco. Patient has tried Norco and Motrin for the pain. The opposite hand is okay. The splint was in good repair. Patient works at [employer]. Patient also had surgery on both of his feet 15 days ago. He is here for a pre-op discussion. Review of Systems   Constitutional: Positive for activity change. Negative for appetite change, fatigue and fever. Respiratory: Negative. Negative for apnea, cough, chest tightness and shortness of breath. Cardiovascular: Negative. Negative for chest pain, palpitations and leg swelling. Gastrointestinal: Negative for abdominal distention, abdominal pain, constipation, diarrhea, nausea and vomiting. Genitourinary: Negative for difficulty urinating, dysuria and hematuria. Musculoskeletal: Positive for arthralgias, gait problem and joint swelling. Negative for myalgias. Skin: Negative for color change and rash. Neurological: Negative for dizziness, weakness, numbness and headaches. Psychiatric/Behavioral: Positive for sleep disturbance. I have reviewed the CC, HPI, ROS, PMH, FHX, Social History, and if not present in this note, I have reviewed in the patient's chart.    I agree with the documentation provided by other staff and have reviewed their documentation prior to providing my signature indicating agreement. Vitals:   Ht 5' 8\" (1.727 m)   Wt 180 lb (81.6 kg)   BMI 27.37 kg/m²  Body mass index is 27.37 kg/m². Physical Examination:     Orthopedics:    GENERAL: Alert and oriented X3 in no acute distress. SKIN: Intact without lesions or ulcerations. NEURO: Musculoskeletal and axillary nerves intact to sensory and motor testing. VASC: Capillary refill is less than 3 seconds. Fracture:    LOCATION: Right hand  SITE: Distal neurocirculatory status is intact. EXAM: Sensation is intact to light touch there is full motor function of the extremity  PALP: fracture site is palpated with  pain. ROM: fingers acceptable, swelling acceptable. Assessment:     1. Closed traumatic displaced fracture of multiple metacarpal bones with routine healing, subsequent encounter        Procedures:    Procedure: no    Radiology:     TWO XRAY VIEWS OF THE RIGHT HAND       5/27/2019 1:41 pm       COMPARISON:   05/27/2019, 1140 hours       HISTORY:   ORDERING SYSTEM PROVIDED HISTORY: Post Reduction   TECHNOLOGIST PROVIDED HISTORY:   Post Reduction   Ordering Physician Provided Reason for Exam: Post reduction rt hand   Acuity: Unknown   Type of Exam: Unknown       78-year-old male with postreduction views of the right hand       FINDINGS:   Overlying cast/splint material limits evaluation of fine osseous detail.       There is improved alignment of the fracture dislocation involving the 3rd   through 5th metacarpal bases and CMC joints.  No superimposed acute osseous   abnormality identified.  Mild soft tissue swelling at the dorsum of the hand.           Impression   1. Overlying cast/splint material limits evaluation of fine osseous detail. 2. Improved alignment of the fracture dislocation involving the 3rd through   5th metacarpal bases and CMC joints.  Mild associated soft tissue swelling at   the dorsum of the hand.    3. No superimposed acute osseous abnormality evident. Plan:   Fracture Treatment : I reviewed the X-ray with the patient and I informed them that he needs a surgery to stabilize the dislocated joints. We discussed the etiologies and natural histories of Closed traumatic displaced fracture/dislocation of multiple metacarpal bones with routine healing. We discussed the various treatment alternatives including anti-inflammatory medications, physical therapy, injections, further imaging studies and as a last result surgery. During today's visit, we discussed the risks and benefits of the procedure. The patient has opted for proceeding with a surgery. Patient should return to the clinic in 1 week to follow up with  Laura Stahl PA-C, ATC. The patient will call the office immediately with any problems. No orders of the defined types were placed in this encounter. No orders of the defined types were placed in this encounter. Deya Nino PA-C, ATC, am scribing for and in the presence of Shelley Lucero D.O.. 5/30/2019  5:29 PM      I, Shelley Lucero DO, have personally seen this patient, reviewed the chart including history, and imaging if done. I personally  performed the physical exam and obtained any needed additional history. I placed orders, performed or supervised procedures and developed the treatment plan.       Electronically signed by Pennie Rodgers PA-C, on 5/30/2019 at 5:29 PM

## 2019-05-31 ENCOUNTER — APPOINTMENT (OUTPATIENT)
Dept: GENERAL RADIOLOGY | Age: 29
End: 2019-05-31
Attending: ORTHOPAEDIC SURGERY
Payer: MEDICARE

## 2019-05-31 ENCOUNTER — ANESTHESIA (OUTPATIENT)
Dept: OPERATING ROOM | Age: 29
End: 2019-05-31
Payer: MEDICARE

## 2019-05-31 ENCOUNTER — HOSPITAL ENCOUNTER (OUTPATIENT)
Age: 29
Setting detail: OUTPATIENT SURGERY
Discharge: HOME | End: 2019-05-31
Attending: ORTHOPAEDIC SURGERY | Admitting: ORTHOPAEDIC SURGERY
Payer: MEDICARE

## 2019-05-31 VITALS
TEMPERATURE: 97.3 F | HEIGHT: 68 IN | BODY MASS INDEX: 26.31 KG/M2 | SYSTOLIC BLOOD PRESSURE: 123 MMHG | DIASTOLIC BLOOD PRESSURE: 79 MMHG | WEIGHT: 173.6 LBS | OXYGEN SATURATION: 100 % | RESPIRATION RATE: 13 BRPM | HEART RATE: 59 BPM

## 2019-05-31 VITALS — SYSTOLIC BLOOD PRESSURE: 89 MMHG | OXYGEN SATURATION: 100 % | TEMPERATURE: 96.4 F | DIASTOLIC BLOOD PRESSURE: 54 MMHG

## 2019-05-31 DIAGNOSIS — S63.054A DISLOCATION OF CARPOMETACARPAL JOINT OF RIGHT HAND, INITIAL ENCOUNTER: Primary | ICD-10-CM

## 2019-05-31 PROBLEM — S63.056A CMC (CARPOMETACARPAL JOINT) DISLOCATION: Status: ACTIVE | Noted: 2019-05-31

## 2019-05-31 PROCEDURE — 2580000003 HC RX 258: Performed by: ANESTHESIOLOGY

## 2019-05-31 PROCEDURE — 7100000011 HC PHASE II RECOVERY - ADDTL 15 MIN: Performed by: ORTHOPAEDIC SURGERY

## 2019-05-31 PROCEDURE — 7100000010 HC PHASE II RECOVERY - FIRST 15 MIN: Performed by: ORTHOPAEDIC SURGERY

## 2019-05-31 PROCEDURE — C1713 ANCHOR/SCREW BN/BN,TIS/BN: HCPCS | Performed by: ORTHOPAEDIC SURGERY

## 2019-05-31 PROCEDURE — 7100000000 HC PACU RECOVERY - FIRST 15 MIN: Performed by: ORTHOPAEDIC SURGERY

## 2019-05-31 PROCEDURE — 6360000002 HC RX W HCPCS: Performed by: NURSE ANESTHETIST, CERTIFIED REGISTERED

## 2019-05-31 PROCEDURE — 7100000001 HC PACU RECOVERY - ADDTL 15 MIN: Performed by: ORTHOPAEDIC SURGERY

## 2019-05-31 PROCEDURE — 2500000003 HC RX 250 WO HCPCS: Performed by: NURSE ANESTHETIST, CERTIFIED REGISTERED

## 2019-05-31 PROCEDURE — 26676 PIN HAND DISLOCATION: CPT | Performed by: ORTHOPAEDIC SURGERY

## 2019-05-31 PROCEDURE — 73130 X-RAY EXAM OF HAND: CPT

## 2019-05-31 PROCEDURE — 3600000002 HC SURGERY LEVEL 2 BASE: Performed by: ORTHOPAEDIC SURGERY

## 2019-05-31 PROCEDURE — 6360000002 HC RX W HCPCS: Performed by: ANESTHESIOLOGY

## 2019-05-31 PROCEDURE — 2709999900 HC NON-CHARGEABLE SUPPLY: Performed by: ORTHOPAEDIC SURGERY

## 2019-05-31 PROCEDURE — 3700000001 HC ADD 15 MINUTES (ANESTHESIA): Performed by: ORTHOPAEDIC SURGERY

## 2019-05-31 PROCEDURE — 3600000012 HC SURGERY LEVEL 2 ADDTL 15MIN: Performed by: ORTHOPAEDIC SURGERY

## 2019-05-31 PROCEDURE — 2500000003 HC RX 250 WO HCPCS: Performed by: ORTHOPAEDIC SURGERY

## 2019-05-31 PROCEDURE — 3209999900 FLUORO FOR SURGICAL PROCEDURES

## 2019-05-31 PROCEDURE — 6360000002 HC RX W HCPCS: Performed by: ORTHOPAEDIC SURGERY

## 2019-05-31 PROCEDURE — 3700000000 HC ANESTHESIA ATTENDED CARE: Performed by: ORTHOPAEDIC SURGERY

## 2019-05-31 DEVICE — IMPLANTABLE DEVICE
Type: IMPLANTABLE DEVICE | Site: HAND | Status: FUNCTIONAL
Brand: MICROAIRE®

## 2019-05-31 RX ORDER — HYDROMORPHONE HCL 110MG/55ML
0.25 PATIENT CONTROLLED ANALGESIA SYRINGE INTRAVENOUS EVERY 5 MIN PRN
Status: DISCONTINUED | OUTPATIENT
Start: 2019-05-31 | End: 2019-05-31 | Stop reason: HOSPADM

## 2019-05-31 RX ORDER — GLYCOPYRROLATE 1 MG/5 ML
SYRINGE (ML) INTRAVENOUS PRN
Status: DISCONTINUED | OUTPATIENT
Start: 2019-05-31 | End: 2019-05-31 | Stop reason: SDUPTHER

## 2019-05-31 RX ORDER — FENTANYL CITRATE 50 UG/ML
50 INJECTION, SOLUTION INTRAMUSCULAR; INTRAVENOUS EVERY 5 MIN PRN
Status: DISCONTINUED | OUTPATIENT
Start: 2019-05-31 | End: 2019-05-31 | Stop reason: HOSPADM

## 2019-05-31 RX ORDER — PROPOFOL 10 MG/ML
INJECTION, EMULSION INTRAVENOUS PRN
Status: DISCONTINUED | OUTPATIENT
Start: 2019-05-31 | End: 2019-05-31 | Stop reason: SDUPTHER

## 2019-05-31 RX ORDER — ONDANSETRON 2 MG/ML
INJECTION INTRAMUSCULAR; INTRAVENOUS PRN
Status: DISCONTINUED | OUTPATIENT
Start: 2019-05-31 | End: 2019-05-31 | Stop reason: SDUPTHER

## 2019-05-31 RX ORDER — SODIUM CHLORIDE, SODIUM LACTATE, POTASSIUM CHLORIDE, CALCIUM CHLORIDE 600; 310; 30; 20 MG/100ML; MG/100ML; MG/100ML; MG/100ML
INJECTION, SOLUTION INTRAVENOUS CONTINUOUS
Status: DISCONTINUED | OUTPATIENT
Start: 2019-06-01 | End: 2019-05-31 | Stop reason: HOSPADM

## 2019-05-31 RX ORDER — HYDROMORPHONE HCL 110MG/55ML
0.5 PATIENT CONTROLLED ANALGESIA SYRINGE INTRAVENOUS EVERY 5 MIN PRN
Status: DISCONTINUED | OUTPATIENT
Start: 2019-05-31 | End: 2019-05-31 | Stop reason: HOSPADM

## 2019-05-31 RX ORDER — ONDANSETRON 2 MG/ML
4 INJECTION INTRAMUSCULAR; INTRAVENOUS
Status: DISCONTINUED | OUTPATIENT
Start: 2019-05-31 | End: 2019-05-31 | Stop reason: HOSPADM

## 2019-05-31 RX ORDER — BUPIVACAINE HYDROCHLORIDE 5 MG/ML
INJECTION, SOLUTION PERINEURAL PRN
Status: DISCONTINUED | OUTPATIENT
Start: 2019-05-31 | End: 2019-05-31 | Stop reason: ALTCHOICE

## 2019-05-31 RX ORDER — MIDAZOLAM HYDROCHLORIDE 1 MG/ML
INJECTION INTRAMUSCULAR; INTRAVENOUS PRN
Status: DISCONTINUED | OUTPATIENT
Start: 2019-05-31 | End: 2019-05-31 | Stop reason: SDUPTHER

## 2019-05-31 RX ORDER — LIDOCAINE HYDROCHLORIDE 10 MG/ML
1 INJECTION, SOLUTION EPIDURAL; INFILTRATION; INTRACAUDAL; PERINEURAL
Status: DISCONTINUED | OUTPATIENT
Start: 2019-06-01 | End: 2019-05-31 | Stop reason: HOSPADM

## 2019-05-31 RX ORDER — HYDROCODONE BITARTRATE AND ACETAMINOPHEN 5; 325 MG/1; MG/1
1 TABLET ORAL EVERY 4 HOURS PRN
Qty: 30 TABLET | Refills: 0 | Status: SHIPPED | OUTPATIENT
Start: 2019-05-31 | End: 2019-06-07

## 2019-05-31 RX ORDER — DEXAMETHASONE SODIUM PHOSPHATE 10 MG/ML
INJECTION INTRAMUSCULAR; INTRAVENOUS PRN
Status: DISCONTINUED | OUTPATIENT
Start: 2019-05-31 | End: 2019-05-31 | Stop reason: SDUPTHER

## 2019-05-31 RX ORDER — LIDOCAINE HYDROCHLORIDE 20 MG/ML
INJECTION, SOLUTION EPIDURAL; INFILTRATION; INTRACAUDAL; PERINEURAL PRN
Status: DISCONTINUED | OUTPATIENT
Start: 2019-05-31 | End: 2019-05-31 | Stop reason: SDUPTHER

## 2019-05-31 RX ORDER — NEOSTIGMINE METHYLSULFATE 5 MG/5 ML
SYRINGE (ML) INTRAVENOUS PRN
Status: DISCONTINUED | OUTPATIENT
Start: 2019-05-31 | End: 2019-05-31 | Stop reason: SDUPTHER

## 2019-05-31 RX ORDER — CEFAZOLIN SODIUM 2 G/50ML
2 SOLUTION INTRAVENOUS ONCE
Status: COMPLETED | OUTPATIENT
Start: 2019-05-31 | End: 2019-05-31

## 2019-05-31 RX ORDER — FENTANYL CITRATE 50 UG/ML
25 INJECTION, SOLUTION INTRAMUSCULAR; INTRAVENOUS EVERY 5 MIN PRN
Status: DISCONTINUED | OUTPATIENT
Start: 2019-05-31 | End: 2019-05-31 | Stop reason: HOSPADM

## 2019-05-31 RX ORDER — FENTANYL CITRATE 50 UG/ML
INJECTION, SOLUTION INTRAMUSCULAR; INTRAVENOUS PRN
Status: DISCONTINUED | OUTPATIENT
Start: 2019-05-31 | End: 2019-05-31 | Stop reason: SDUPTHER

## 2019-05-31 RX ORDER — ROCURONIUM BROMIDE 10 MG/ML
INJECTION, SOLUTION INTRAVENOUS PRN
Status: DISCONTINUED | OUTPATIENT
Start: 2019-05-31 | End: 2019-05-31 | Stop reason: SDUPTHER

## 2019-05-31 RX ADMIN — PROPOFOL 200 MG: 10 INJECTION, EMULSION INTRAVENOUS at 15:42

## 2019-05-31 RX ADMIN — Medication 100 MCG: at 15:42

## 2019-05-31 RX ADMIN — DEXAMETHASONE SODIUM PHOSPHATE 10 MG: 10 INJECTION INTRAMUSCULAR; INTRAVENOUS at 15:53

## 2019-05-31 RX ADMIN — Medication 0.4 MG: at 16:22

## 2019-05-31 RX ADMIN — FENTANYL CITRATE 50 MCG: 50 INJECTION, SOLUTION INTRAMUSCULAR; INTRAVENOUS at 17:18

## 2019-05-31 RX ADMIN — LIDOCAINE HYDROCHLORIDE 100 MG: 20 INJECTION, SOLUTION EPIDURAL; INFILTRATION; INTRACAUDAL; PERINEURAL at 15:42

## 2019-05-31 RX ADMIN — SODIUM CHLORIDE, POTASSIUM CHLORIDE, SODIUM LACTATE AND CALCIUM CHLORIDE: 600; 310; 30; 20 INJECTION, SOLUTION INTRAVENOUS at 14:01

## 2019-05-31 RX ADMIN — ONDANSETRON 4 MG: 2 INJECTION, SOLUTION INTRAMUSCULAR; INTRAVENOUS at 15:53

## 2019-05-31 RX ADMIN — FENTANYL CITRATE 50 MCG: 50 INJECTION, SOLUTION INTRAMUSCULAR; INTRAVENOUS at 17:05

## 2019-05-31 RX ADMIN — MIDAZOLAM 2 MG: 1 INJECTION INTRAMUSCULAR; INTRAVENOUS at 15:39

## 2019-05-31 RX ADMIN — Medication 3 MG: at 16:22

## 2019-05-31 RX ADMIN — ROCURONIUM BROMIDE 50 MG: 10 INJECTION, SOLUTION INTRAVENOUS at 15:42

## 2019-05-31 RX ADMIN — Medication 0.2 MG: at 15:57

## 2019-05-31 RX ADMIN — CEFAZOLIN SODIUM 2 G: 2 SOLUTION INTRAVENOUS at 15:52

## 2019-05-31 ASSESSMENT — PULMONARY FUNCTION TESTS
PIF_VALUE: 21
PIF_VALUE: 15
PIF_VALUE: 12
PIF_VALUE: 25
PIF_VALUE: 17
PIF_VALUE: 17
PIF_VALUE: 13
PIF_VALUE: 15
PIF_VALUE: 16
PIF_VALUE: 22
PIF_VALUE: 17
PIF_VALUE: 12
PIF_VALUE: 20
PIF_VALUE: 17
PIF_VALUE: 16
PIF_VALUE: 17
PIF_VALUE: 17
PIF_VALUE: 21
PIF_VALUE: 2
PIF_VALUE: 16
PIF_VALUE: 17
PIF_VALUE: 16
PIF_VALUE: 17
PIF_VALUE: 16
PIF_VALUE: 16
PIF_VALUE: 15
PIF_VALUE: 12
PIF_VALUE: 1
PIF_VALUE: 16
PIF_VALUE: 6
PIF_VALUE: 17
PIF_VALUE: 17
PIF_VALUE: 16
PIF_VALUE: 17
PIF_VALUE: 16
PIF_VALUE: 17
PIF_VALUE: 15
PIF_VALUE: 17
PIF_VALUE: 12
PIF_VALUE: 1
PIF_VALUE: 17
PIF_VALUE: 12
PIF_VALUE: 0
PIF_VALUE: 12
PIF_VALUE: 17
PIF_VALUE: 18
PIF_VALUE: 17
PIF_VALUE: 12
PIF_VALUE: 17
PIF_VALUE: 12
PIF_VALUE: 17
PIF_VALUE: 16
PIF_VALUE: 16
PIF_VALUE: 15

## 2019-05-31 ASSESSMENT — PAIN DESCRIPTION - PROGRESSION
CLINICAL_PROGRESSION: GRADUALLY WORSENING
CLINICAL_PROGRESSION: GRADUALLY IMPROVING
CLINICAL_PROGRESSION: GRADUALLY WORSENING

## 2019-05-31 ASSESSMENT — PAIN DESCRIPTION - ORIENTATION
ORIENTATION: RIGHT

## 2019-05-31 ASSESSMENT — PAIN SCALES - GENERAL
PAINLEVEL_OUTOF10: 5
PAINLEVEL_OUTOF10: 0
PAINLEVEL_OUTOF10: 8
PAINLEVEL_OUTOF10: 7
PAINLEVEL_OUTOF10: 7
PAINLEVEL_OUTOF10: 8
PAINLEVEL_OUTOF10: 6
PAINLEVEL_OUTOF10: 7

## 2019-05-31 ASSESSMENT — PAIN DESCRIPTION - PAIN TYPE
TYPE: ACUTE PAIN
TYPE: SURGICAL PAIN

## 2019-05-31 ASSESSMENT — PAIN DESCRIPTION - LOCATION
LOCATION: HAND

## 2019-05-31 ASSESSMENT — LIFESTYLE VARIABLES: SMOKING_STATUS: 1

## 2019-05-31 ASSESSMENT — PAIN DESCRIPTION - ONSET
ONSET: ON-GOING
ONSET: ON-GOING
ONSET: GRADUAL

## 2019-05-31 ASSESSMENT — PAIN DESCRIPTION - DESCRIPTORS
DESCRIPTORS: THROBBING
DESCRIPTORS: THROBBING
DESCRIPTORS: ACHING
DESCRIPTORS: THROBBING

## 2019-05-31 NOTE — BRIEF OP NOTE
Brief Postoperative Note  ______________________________________________________________    Patient: Yareli Cortes  YOB: 1990  MRN: [id number]  Date of Procedure: 5/31/2019    Pre-Op Diagnosis: DX CLOSED FX dislocation of 3-5 CMC Joints    Post-Op Diagnosis: Same       Procedure(s):  RIGHT  HAND CRPP, RIGHT 3RD, 4TH, 5TH METOCARPAL DISLOCATION    Anesthesia: General    Surgeon(s):  Samantha Negrete DO    Assistant:   DO Carmelita Walsh DO    Estimated Blood Loss (mL): 2    Complications: None    Specimens:   * No specimens in log *    Implants:  Implant Name Type Inv. Item Serial No.  Lot No. LRB No. Used   GRAFT 2020 [facility] SURG REG COMPOSIX LP W/INTRO [id number] Mesh GRAFT 2020 [facility] SURG REG COMPOSIX LP W/INTRO [id number]  [employer]  Right 3         Drains: * No LDAs found *    Findings: See op note for details.      Merlin Rhein, [address]  Date: 5/31/2019  Time: 4:42 PM

## 2019-05-31 NOTE — H&P
History and Physical Update    Pt Name: Marko Zayas  MRN: [id number]  [place]: 1990  Date of evaluation: 5/31/2019      [x] I have reviewed the Orthopedic Note by Harley Vega PA-C dated 5/29/19 in [facility]  which meets the criteria for an Interval History and Physical note and is attached below    [x] I have examined  Marko Zayas  There are no changes to the patient who is scheduled for a Right hand open reduction internal fixation vs CRPP by Dr David Lacy for closed traumatic multiple displaced  metacarpal fractures right hand Pain rated 8/10 He denies health changes, fever, chills, productive cough, SOB, chest pain, open sores or wounds. Vital signs: /70   Pulse 61   Temp 98.4 °F (36.9 °C) (Oral)   Resp 18   Ht 5' 8\" (1.727 m)   Wt 173 lb 9.6 oz (78.7 kg)   SpO2 99%   BMI 26.40 kg/m²     Allergies:  Seasonal    Medications:    Prior to Admission medications    Medication Sig Start Date End Date Taking? Authorizing Provider   ibuprofen (ADVIL;MOTRIN) 800 MG tablet Take 1 tablet by mouth every 8 hours as needed for Pain 5/27/19  Yes My Ann PA-C       This is a 25 y.o. male who is pleasant, cooperative, alert and oriented x3, in no acute distress. Heart: Heart sounds are normal.  HR 61 regular rate and rhythm without murmur, gallop or rub. Lungs: Normal respiratory effort with good air exchange, unlabored and clear to auscultation without wheezes or rales bilaterally   Abdomen: soft, nontender, nondistended with bowel sounds. Extremities: Splinted right forearm/hand  Mild swelling of all fingers  Warm to touch with normal capillary refill and sensation to touch.         Labs:  Recent Labs     05/14/19  1230   HGB 14.6   HCT 42.9   WBC 5.6   MCV 95.3          Myak, Izabella Latisha  RODNEY ANP-BC  Electronically signed 5/31/2019 at 1:53 PM        Damian Davidson PA-C   Physician Assistant   Physician Assistant   Progress Notes      Signed   Encounter Date:  5/29/2019 Signed        Expand All Collapse All           Show:Clear all  [x]Manual[x]Template[]Copied    Added by:  [x]Alex Uriostegui PA-C      []Miguelangel for details            [address] [address]  Dept: [phone]  Dept Fax: [phone]         Fracture Follow Up        Subjective:           Chief Complaint   Patient presents with    Hand Injury       closed traumatic displaced fx of multiple metacarpal bone rt hand DOI 5/27/19 pt stated he punched the ground after becoming upset that he fell. pt seen at [address] [place] [address]          HPI:      Initial visit:      Veronica Pimentel is a 25y.o. year old male who presents to our office today regarding his Closed traumatic displaced fracture of multiple metacarpal bones. The injury was caused by a punch to the ground. The date of injury was on 05/27/19. Therefore, we are 2 day(s) post injury. Patient went to [address] [place], [place] [address] ED for initial treatment which included x-ray and was given a splint and a script for Norco. Patient has tried Norco and Motrin for the pain. The opposite hand is okay. The splint was in good repair. Patient works at [employer]. Patient also had surgery on both of his feet 15 days ago. Review of Systems   Constitutional: Positive for activity change. Negative for appetite change. Respiratory: Negative for apnea, cough, chest tightness and shortness of breath. Cardiovascular: Negative for chest pain, palpitations and leg swelling. Gastrointestinal: Negative for abdominal distention, abdominal pain, constipation, diarrhea, nausea and vomiting. Genitourinary: Negative for difficulty urinating, dysuria and hematuria. Musculoskeletal: Positive for arthralgias, gait problem and joint swelling. Negative for myalgias. Skin: Negative for color change and rash.    Neurological: Negative for dizziness, weakness, numbness and headaches. Psychiatric/Behavioral: Negative for sleep disturbance. I have reviewed the CC, HPI, ROS, PMH, FHX, Social History, and if not present in this note, I have reviewed in the patient's chart. I agree with the documentation provided by other staff and have reviewed their documentation prior to providing my signature indicating agreement. Vitals:   /86   Pulse 68   Ht 5' 7.99\" (1.727 m)   Wt 179 lb 14.3 oz (81.6 kg)   BMI 27.36 kg/m²  Body mass index is 27.36 kg/m². Physical Examination:      Orthopedics:     GENERAL: Alert and oriented X3 in no acute distress. SKIN: Intact without lesions or ulcerations. NEURO: Musculoskeletal and axillary nerves intact to sensory and motor testing. VASC: Capillary refill is less than 3 seconds. Fracture:     LOCATION: Right Hand   SITE: Distal neurocirculatory status is intact. EXAM: Sensation is intact to light touch, there is full motor function of the extremity. PALP: fracture site is palpated with minimal pain. ROM: hand and fingers are moving well. Assessment:      1. Closed fracture of multiple metacarpal bones, initial encounter          Procedures:    Procedure: no     Radiology:   Xr Elbow Right (min 3 Views)     Result Date: 5/27/2019  EXAMINATION: 3 XRAY VIEWS OF THE RIGHT ELBOW; 3 XRAY VIEWS OF THE RIGHT HAND; 3 XRAY VIEWS OF THE RIGHT WRIST 5/27/2019 11:50 am COMPARISON: None. HISTORY: ORDERING SYSTEM PROVIDED HISTORY: Pain TECHNOLOGIST PROVIDED HISTORY: Pain Ordering Physician Provided Reason for Exam: fell landed on hand to catch self Acuity: Unknown Type of Exam: Unknown 33-year-old male who fell and landed to catch himself; right upper extremity pain FINDINGS: Right elbow: Osseous alignment is normal.  Joint spaces are well maintained. No acute fracture or gross dislocation is seen. The radial head and radial neck appear intact.  There is no significant elevation of the posterior fat pad or sail sign to suggest a joint effusion. Mild enthesopathy at the distal triceps tendon insertion. Right wrist: Soft tissue swelling at the ulnar and dorsal aspect of the hand with dorsal dislocation of the 3rd through 5th metacarpals and intra-articular fractures at the 3rd through 5th carpometacarpal joints, best appreciated on the lateral view. Scaphoid appears intact. No chondrocalcinosis. Right hand: Soft tissue swelling along the ulnar and dorsal aspect of the right hand secondary to fracture dislocations involving the 3rd through 5th carpometacarpal joints best appreciated on the lateral view. Complete dorsal dislocation of the 3rd through 5th proximal metacarpals in relation to the carpus with associated fracture fragments. No marginal erosions. Scaphoid appears intact. Remaining visualized osseous structures appear grossly intact. No chondrocalcinosis. Right elbow: No acute fracture or gross dislocation. Right wrist: Acute fracture dislocations along the 3rd through 5th carpometacarpal joints with ulnar and dorsal soft tissue swelling. Right hand: 1. Acute fracture dislocation involving the 3rd through 5th CMC joints with ulnar and dorsal soft tissue swelling and associated fracture fragments. 2. Complete dorsal dislocation of the 3rd through 5th proximal metacarpals in relation to the carpus. Xr Wrist Right (min 3 Views)     Result Date: 5/27/2019  EXAMINATION: 3 XRAY VIEWS OF THE RIGHT ELBOW; 3 XRAY VIEWS OF THE RIGHT HAND; 3 XRAY VIEWS OF THE RIGHT WRIST 5/27/2019 11:50 am COMPARISON: None. HISTORY: ORDERING SYSTEM PROVIDED HISTORY: Pain TECHNOLOGIST PROVIDED HISTORY: Pain Ordering Physician Provided Reason for Exam: fell landed on hand to catch self Acuity: Unknown Type of Exam: Unknown 78-year-old male who fell and landed to catch himself; right upper extremity pain FINDINGS: Right elbow: Osseous alignment is normal.  Joint spaces are well maintained. No acute fracture or gross dislocation is seen.  The radial head and radial neck appear intact. There is no significant elevation of the posterior fat pad or sail sign to suggest a joint effusion. Mild enthesopathy at the distal triceps tendon insertion. Right wrist: Soft tissue swelling at the ulnar and dorsal aspect of the hand with dorsal dislocation of the 3rd through 5th metacarpals and intra-articular fractures at the 3rd through 5th carpometacarpal joints, best appreciated on the lateral view. Scaphoid appears intact. No chondrocalcinosis. Right hand: Soft tissue swelling along the ulnar and dorsal aspect of the right hand secondary to fracture dislocations involving the 3rd through 5th carpometacarpal joints best appreciated on the lateral view. Complete dorsal dislocation of the 3rd through 5th proximal metacarpals in relation to the carpus with associated fracture fragments. No marginal erosions. Scaphoid appears intact. Remaining visualized osseous structures appear grossly intact. No chondrocalcinosis. Right elbow: No acute fracture or gross dislocation. Right wrist: Acute fracture dislocations along the 3rd through 5th carpometacarpal joints with ulnar and dorsal soft tissue swelling. Right hand: 1. Acute fracture dislocation involving the 3rd through 5th CMC joints with ulnar and dorsal soft tissue swelling and associated fracture fragments. 2. Complete dorsal dislocation of the 3rd through 5th proximal metacarpals in relation to the carpus. Xr Hand Right (2 Views)     Result Date: 5/27/2019  EXAMINATION: TWO XRAY VIEWS OF THE RIGHT HAND 5/27/2019 1:41 pm COMPARISON: 05/27/2019, 1140 hours HISTORY: ORDERING SYSTEM PROVIDED HISTORY: Post Reduction TECHNOLOGIST PROVIDED HISTORY: Post Reduction Ordering Physician Provided Reason for Exam: Post reduction rt hand Acuity: Unknown Type of Exam: Unknown 55-year-old male with postreduction views of the right hand FINDINGS: Overlying cast/splint material limits evaluation of fine osseous detail.  There is improved alignment of the fracture dislocation involving the 3rd through 5th metacarpal bases and CMC joints. No superimposed acute osseous abnormality identified. Mild soft tissue swelling at the dorsum of the hand. 1. Overlying cast/splint material limits evaluation of fine osseous detail. 2. Improved alignment of the fracture dislocation involving the 3rd through 5th metacarpal bases and CMC joints. Mild associated soft tissue swelling at the dorsum of the hand. 3. No superimposed acute osseous abnormality evident. Xr Hand Right (min 3 Views)     Result Date: 5/27/2019  EXAMINATION: 3 XRAY VIEWS OF THE RIGHT ELBOW; 3 XRAY VIEWS OF THE RIGHT HAND; 3 XRAY VIEWS OF THE RIGHT WRIST 5/27/2019 11:50 am COMPARISON: None. HISTORY: ORDERING SYSTEM PROVIDED HISTORY: Pain TECHNOLOGIST PROVIDED HISTORY: Pain Ordering Physician Provided Reason for Exam: fell landed on hand to catch self Acuity: Unknown Type of Exam: Unknown 78-year-old male who fell and landed to catch himself; right upper extremity pain FINDINGS: Right elbow: Osseous alignment is normal.  Joint spaces are well maintained. No acute fracture or gross dislocation is seen. The radial head and radial neck appear intact. There is no significant elevation of the posterior fat pad or sail sign to suggest a joint effusion. Mild enthesopathy at the distal triceps tendon insertion. Right wrist: Soft tissue swelling at the ulnar and dorsal aspect of the hand with dorsal dislocation of the 3rd through 5th metacarpals and intra-articular fractures at the 3rd through 5th carpometacarpal joints, best appreciated on the lateral view. Scaphoid appears intact. No chondrocalcinosis. Right hand: Soft tissue swelling along the ulnar and dorsal aspect of the right hand secondary to fracture dislocations involving the 3rd through 5th carpometacarpal joints best appreciated on the lateral view.   Complete dorsal dislocation of the 3rd through 5th proximal metacarpals in relation to the carpus with associated fracture fragments. No marginal erosions. Scaphoid appears intact. Remaining visualized osseous structures appear grossly intact. No chondrocalcinosis. Right elbow: No acute fracture or gross dislocation. Right wrist: Acute fracture dislocations along the 3rd through 5th carpometacarpal joints with ulnar and dorsal soft tissue swelling. Right hand: 1. Acute fracture dislocation involving the 3rd through 5th CMC joints with ulnar and dorsal soft tissue swelling and associated fracture fragments. 2. Complete dorsal dislocation of the 3rd through 5th proximal metacarpals in relation to the carpus. Plan:   Fracture Treatment : I reviewed the X-ray with the patient and I informed them that the hand had three dislocated hand bones with fractures that will require surgery so he may get the best hand he can possibly have. We discussed the etiologies and natural histories of Closed traumatic fracture dislocation of multiple metacarpal bones in the right hand. We discussed the various treatment alternatives including anti-inflammatory medications, physical therapy, injections, further imaging studies and as a last result surgery. During today's visit, I explained to the patient that he should not be weight bearing on his hand and he should do his best not to weight bear on his feet since he had a surgery on both of them 15 days ago. From there, I told him that we will keep the hospital splint on but shorten it so his fractures will be stable going into surgery on Friday. The patient asked me if he is okay to have a stick so he can itch his arm when he is in the cast. I then told him that he should not stick anything down the cast. The patient then stated that he will not stick anything down his cast and he was just playing. At this time, the patient has opted for returning to the office tomorrow for a pre-op appt with Dr. Linh Brizuela.  The cast should become fully dry within 83-51 hours of application. The patient was instructed to keep the cast dry at all times and will cover it if there is a possibility of it becoming wet. The patient was also instructed to not stick anything down the cast to avoid causing injury to the skin and increasing their risk of infection. If the cast becomes uncomfortable the patient can use a blow dryer on a cool setting to help alleviate the discomfort. The patient should not weight bear through the cast as it is not designed to handle the force and could cause further injury to the patient. If the patient finds the cast to become unbearable they should call our office right away to have the cast removed. Patient should return to the clinic in 1 day to follow up with Adrian HARDIN for his pre-operative discussion. The patient will call the office immediately with any problems. Encounter Medications    No orders of the defined types were placed in this encounter. No orders of the defined types were placed in this encounter. Flakita Read Day V, am scribing for and in the presence of Birgit Wagner ATC 5/31/2019  7:24 AM           IAlexandrea PA-C, ATC,  have personally seen this patient, reviewed the chart including history, and imaging if done. I personally  performed the physical exam and obtained any needed additional history. I placed orders, performed or supervised procedures and developed the treatment plan.      Electronically signed by Mell Aguilera PA-C, on 5/31/2019 at 7:26 AM              Electronically signed by Mell Aguilera PA-C, on 5/31/2019 at 7:24 AM                ·   Office Visit on 5/29/2019   ·     ·   Revision History   ·     ·   Detailed Report   ·     ·   Note shared with patient   Progress Notes Info     Author Note Status Last Update User   Mell Aguilera PA-C Signed Mell Aguilera PA-C   Last Update Date/Time: 5/31/2019  7:27 AM   Chart Review Routing History     Routing history could not

## 2019-05-31 NOTE — ANESTHESIA PRE PROCEDURE
Department of Anesthesiology  Preprocedure Note       Name:  Maxx Corado   Age:  25 y.o.  :  1990                                          MRN:  [id number]         Date:  2019      Surgeon: Elidia Julien):  Tyrone Hardwick DO    Procedure: RIGHT  HAND OPEN REDUCTION INTERNAL FIXATION VS CRPP (Right )    Medications prior to admission:   Prior to Admission medications    Medication Sig Start Date End Date Taking? Authorizing Provider   ibuprofen (ADVIL;MOTRIN) 800 MG tablet Take 1 tablet by mouth every 8 hours as needed for Pain 19  Yes Cori Gonzales PA-C       Current medications:    Current Facility-Administered Medications   Medication Dose Route Frequency Provider Last Rate Last Dose    ceFAZolin (ANCEF) 2 g in dextrose 3 % 50 mL IVPB (duplex)  2 g Intravenous Once Tyrone Hardwick DO        [START ON 2019] lactated ringers infusion   Intravenous Continuous Polo Seals MD 50 mL/hr at 19 1401      [START ON 2019] lidocaine PF 1 % injection 1 mL  1 mL Intradermal Once PRN Polo Seals MD        fentaNYL (SUBLIMAZE) injection 25 mcg  25 mcg Intravenous Q5 Min PRN Roxanna Seth MD        fentaNYL (SUBLIMAZE) injection 50 mcg  50 mcg Intravenous Q5 Min PRN Roxanna Seth MD        HYDROmorphone (DILAUDID) injection 0.25 mg  0.25 mg Intravenous Q5 Min PRN Roxanna Seth MD        HYDROmorphone (DILAUDID) injection 0.5 mg  0.5 mg Intravenous Q5 Min PRN Roxanna Seth MD        ondansetron (ZOFRAN) injection 4 mg  4 mg Intravenous Once PRN Roxanna Seth MD           Allergies:     Allergies   Allergen Reactions    Seasonal        Problem List:    Patient Active Problem List   Diagnosis Code    Hallux abductovalgus with bunions, right M20.11, M21.611    Hammertoe of second toe of right foot M20.41    Metatarsalgia of right foot M77.41       Past Medical History:        Diagnosis Date    Diarrhea     Resolved (Written 2019)    Eczema     Lazy eye, right     Poor historian        Past Surgical History:        Procedure Laterality Date    BUNIONECTOMY Right 12/1/2017    FOOT BUNIONECTOMY OLIVER / HAMMERTOE REPAIR 2ND TOE & MPJ RELEASE 2ND METATARSAL performed by Nazario Wren DPM at [address] [place] [address] Bilateral 5/16/2019    LEFT FOOT LAPIDUS BUNIONECTOMY AND 2ND METATARSAL 2ND DIGIT ARTHROPLASTY, RIGHT 2ND METATARSAL CONDYLECTOMY performed by Turner Cole DPM at [facility]     removed part of foreign body    FOOT SURGERY Bilateral 05/16/2019     LEFT FOOT LAPIDUS BUNIONECTOMY AND 2ND METATARSAL 2ND DIGIT ARTHROPLASTY, RIGHT 2ND METATARSAL CONDYLECTOMY (Bilateral )       Social History:    Social History     Tobacco Use    Smoking status: Current Every Day Smoker     Packs/day: 0.25     Years: 4.00     Pack years: 1.00     Types: Cigarettes     Start date: 11/17/2012    Smokeless tobacco: Never Used    Tobacco comment: 3-4 cig. a day   Substance Use Topics    Alcohol use: Not on file     Comment: Rare                                Ready to quit: Not Answered  Counseling given: Not Answered  Comment: 3-4 cig. a day      Vital Signs (Current):   Vitals:    05/31/19 1326   BP: 122/70   Pulse: 61   Resp: 18   Temp: 98.4 °F (36.9 °C)   TempSrc: Oral   SpO2: 99%   Weight: 173 lb 9.6 oz (78.7 kg)   Height: 5' 8\" (1.727 m)                                              BP Readings from Last 3 Encounters:   05/31/19 122/70   05/29/19 130/86   05/27/19 131/74       NPO Status: Time of last liquid consumption: 0300                        Time of last solid consumption: 0300                        Date of last liquid consumption: 05/31/19                        Date of last solid food consumption: 05/31/19    BMI:   Wt Readings from Last 3 Encounters:   05/31/19 173 lb 9.6 oz (78.7 kg)   05/30/19 180 lb (81.6 kg)   05/29/19 170 lb (77.1 kg)     Body mass index is 26.4 kg/m².     CBC:   Lab Results   Component Value Date    WBC 5.6 05/14/2019    RBC 4.50 05/14/2019    HGB 14.6 05/14/2019    HCT 42.9 05/14/2019    MCV 95.3 05/14/2019    RDW 13.0 05/14/2019     05/14/2019       CMP:   Lab Results   Component Value Date     01/24/2019    K 4.2 01/24/2019     01/24/2019    CO2 20 01/24/2019    BUN 16 01/24/2019    CREATININE 0.94 01/24/2019    GFRAA >60 01/24/2019    LABGLOM >60 01/24/2019    GLUCOSE 93 01/24/2019    PROT 6.9 01/24/2019    CALCIUM 9.2 01/24/2019    BILITOT 0.38 01/24/2019    ALKPHOS 93 01/24/2019    AST 24 01/24/2019    ALT 24 01/24/2019       POC Tests: No results for input(s): POCGLU, POCNA, POCK, POCCL, POCBUN, POCHEMO, POCHCT in the last 72 hours. Coags: No results found for: PROTIME, INR, APTT    HCG (If Applicable): No results found for: PREGTESTUR, PREGSERUM, HCG, HCGQUANT     ABGs: No results found for: PHART, PO2ART, TGZ5OYM, ASU5UJA, BEART, S5DFMYJS     Type & Screen (If Applicable):  No results found for: LABABO, LABRH    Anesthesia Evaluation   no history of anesthetic complications:   Airway: Mallampati: I  TM distance: >3 FB   Neck ROM: full  Mouth opening: > = 3 FB Dental:          Pulmonary: breath sounds clear to auscultation  (+) current smoker                           Cardiovascular:  Exercise tolerance: good (>4 METS),       (-)  angina and  MATHUR      Rhythm: regular  Rate: normal                    Neuro/Psych:               GI/Hepatic/Renal:             Endo/Other:                     Abdominal:           Vascular:                                        Anesthesia Plan      general     ASA 2       Induction: intravenous. Anesthetic plan and risks discussed with patient.                       Antoni Rebolledo MD   5/31/2019

## 2019-05-31 NOTE — ANESTHESIA POSTPROCEDURE EVALUATION
Department of Anesthesiology  Postprocedure Note    Patient: Gosia House  MRN: [id number]  [place]: 1990  Date of evaluation: 5/31/2019  Time:  5:45 PM     Procedure Summary     Date:  05/31/19 Room / Location:  [facility] / [facility]    Anesthesia Start:  1009 Anesthesia Stop:  6908    Procedure:  RIGHT  HAND CRPP, RIGHT 3RD, 4TH, 5TH METOCARPAL DISLOCATION (Right ) Diagnosis:  (DX CLOSED TRAUMATIC DISPLACED FX MUTIPLE METACARPAL BONES RIGHT)    Surgeon:  Jadiel Rivas DO Responsible Provider:  Manuel Fuller MD    Anesthesia Type:  general ASA Status:  2          Anesthesia Type: general    Yudith Phase I: Yudith Score: 10    Yudith Phase II: Yudith Score: 10    Last vitals: Reviewed and per EMR flowsheets.        Anesthesia Post Evaluation    Patient location during evaluation: PACU  Level of consciousness: awake and alert  Complications: no

## 2019-06-03 NOTE — OP NOTE
[address] [address]                                OPERATIVE REPORT    PATIENT NAME: Connor Sparks                     :        1990  MED REC NO:   [id number]                             ROOM:  ACCOUNT NO:   [de-identified]                           ADMIT DATE: 2019  PROVIDER:     Tonya Land    DATE OF PROCEDURE:  2019    SURGEON:  Zoey Shirley. Ashley Ott DO    ASSISTANTS:  Tonya Land DO; and DO Dusty    PREOPERATIVE DIAGNOSIS:  Right third, fourth, and fifth carpometacarpal  joint dislocation. POSTOPERATIVE DIAGNOSIS:  Right third, fourth, and fifth carpometacarpal  joint dislocation. PROCEDURES:  1. Examination under anesthesia of right hand. 2.  Closed reduction and percutaneous pinning of the right third,  fourth, fifth carpometacarpal joints. ANESTHESIA:  General.    ESTIMATED BLOOD LOSS:  2 mL. COMPLICATIONS:  None. PATIENT HISTORY:  The patient is a 23-year-old male who presented to the  [facility] Emergency Department on 2019 after punching the  ground with his right hand. He presented with right hand pain. X-rays  demonstrated closed dislocation of the right third, fourth, and fifth  carpometacarpal joint. These were closed reduced in the emergency  department. He was placed in a splint and followed up in the orthopedic  clinic. Discussion was had with the patient regarding treatment options  for his right hand. Due to the unstable nature of the injury as well as  there being a slight dorsal subluxation seen on the postreduction films,  we felt the patient would benefit from closed versus open reduction of  the right third through fifth carpometacarpal joint.   Risks of the  procedure were discussed in detail including, but not limited to,  bleeding, infection, wound complications, residual pain, stiffness, need  for further surgery, hardware failure, as well as anesthesia risks.   Surgical consent was obtained in the office. All questions were  answered for the patient. The patient wished to proceed with the  procedure described. OPERATIVE NOTE:  The patient was met in the preoperative area, where the  correct operative extremity was clearly marked. All questions were  answered to the patient's family. The patient was transferred to the  operative suite, placed supine on the operative table with the hand  table in place. General anesthesia with LMA intubation was performed by  the anesthesia team.  Preoperative antibiotics were administered by the  anesthesia team.  A well-padded tourniquet was placed high up on the  upper arm, but was never inflated throughout the procedure. The right  arm was prepped and draped in a standard sterile fashion. A surgical  time-out was performed and agreed upon by all members present. C-arm  was brought into position, and examination under anesthesia was  performed under C-arm fluoroscopy. It was found that the third through  fifth carpometacarpal joints were dorsally subluxed and were unstable. At this time, we proceeded with pinning of the 3 joints. A 0.062 K-wire  was introduced percutaneously to the fifth metacarpal.  It was advanced  in a retrograde fashion into the hamate. Before doing this, the  carpometacarpal joint was reduced manually and confirmed to be reduced  under orthogonal views of the C-arm. Another 0.062 K-wire was  introduced dorsally percutaneously in a similar fashion in the base of the 4th metacarpal and advanced in  a retrograde fashion into the hamate. Another 0.062 K-wire was advanced  in retrograde fashion in the third metacarpal, retrograde into the  capitate. X-rays were obtained showing appropriate alignment of the  third through fifth carpometacarpal joints as well as appropriate  placement of the pins. Final x-rays were obtained.   The pins were bent  and clipped, and Jurgan balls were placed on the tip of the pins. 10 mL  of 0.5% Marcaine plain were injected around the pin sites. The hand was  cleaned and dried, and sterile dressings were applied using Adaptic, 4 x  4's, Webril; and a well-padded volar splint was applied, and the  extremity was wrapped with an Ace wrap. The splint was allowed to  harden, and anesthesia was reversed. The patient was extubated without  complication. All counts were correct at the end of the procedure. The  patient was transferred to the recovery room in stable condition. Dr. Yadira Diaz was present and active throughout the entirety of the  procedure.         Roseanne Avilez    D: 06/01/2019 11:35:58       T: 06/01/2019 23:15:21     [email or web address]  Job#: [id number]     Doc#: [id number]    CC:  Leatha Grajeda

## 2019-06-05 ENCOUNTER — OFFICE VISIT (OUTPATIENT)
Dept: PODIATRY | Age: 29
End: 2019-06-05

## 2019-06-05 VITALS — RESPIRATION RATE: 16 BRPM | BODY MASS INDEX: 25.76 KG/M2 | WEIGHT: 170 LBS | HEIGHT: 68 IN

## 2019-06-05 DIAGNOSIS — D23.71 BENIGN NEOPLASM OF SKIN OF RIGHT FOOT: ICD-10-CM

## 2019-06-05 DIAGNOSIS — M21.612 BUNION, LEFT FOOT: ICD-10-CM

## 2019-06-05 DIAGNOSIS — Z98.890 POST-OPERATIVE STATE: Primary | ICD-10-CM

## 2019-06-05 PROCEDURE — 99024 POSTOP FOLLOW-UP VISIT: CPT | Performed by: PODIATRIST

## 2019-06-05 RX ORDER — OXYCODONE HYDROCHLORIDE AND ACETAMINOPHEN 5; 325 MG/1; MG/1
1 TABLET ORAL EVERY 6 HOURS PRN
Qty: 28 TABLET | Refills: 0 | Status: SHIPPED | OUTPATIENT
Start: 2019-06-05 | End: 2019-06-12

## 2019-06-05 NOTE — PROGRESS NOTES
[facility]  [facility] [address]  Suite [facility]  Dept: [phone]  Dept Fax: [phone]    POST-OP PROGRESS NOTE  Date of patient's visit: 6/5/2019  Patient's Name:  Emily Napier YOB: 1990            Patient Care Team:  Viky Darling MD as PCP - General (Family Medicine)  Viky Darling MD as PCP - [facility]  Gutierrez Turner DPM as Physician (Podiatry)  Jaky Allen DPM as Physician (Podiatry)        Chief Complaint   Patient presents with   [facility]       Pt's primary care physician is Tom Mohs, MD last seen 1/28/19    Subjective: Emily Napier is a 25 y.o. male who presents to the office today 3week(s)  S/P left bunionectomy and 2nd metatarsal 2nd digit arthroscopy and 2nd  Metatarsal condylectomyfor correction of bunion and foot pain  Problem List Items Addressed This Visit     None      Visit Diagnoses     Post-operative state    -  Primary    Relevant Orders    XR FOOT LEFT (MIN 3 VIEWS)    Bunion, left foot        Benign neoplasm of skin of right foot          . Patient relates pain is Present and improved. Pain is rated 8 out of 10 and is described as constant. Currently denies F/C/N/V. Is patient taking pain medications as prescribed and is controlling pain. He admits to walking on the foot despite instructions for non weightbearing. He underwent surgical correction of right hand after breaking hand punching the wall. Physical Examination:  Incision is coapted, sutures/steri-strips are intact. Minimal bleeding post operatively. Edema present. No erythema. No Pus. Operative correction is satisfactory. Radiographs: 3 views left foot show good anatomical alignment of 1st ray. Hardware is in appropriate placement at 1st met cuneiform joint. There is very little signs of erik healing at this time. Assessment: Emily Napier is status post as above  Normal post operative course.   Doing

## 2019-06-07 ENCOUNTER — HOSPITAL ENCOUNTER (EMERGENCY)
Age: 29
Discharge: HOME | End: 2019-06-07
Attending: EMERGENCY MEDICINE
Payer: MEDICARE

## 2019-06-07 VITALS
HEART RATE: 78 BPM | HEIGHT: 68 IN | TEMPERATURE: 98.2 F | OXYGEN SATURATION: 98 % | WEIGHT: 175 LBS | DIASTOLIC BLOOD PRESSURE: 61 MMHG | SYSTOLIC BLOOD PRESSURE: 123 MMHG | BODY MASS INDEX: 26.52 KG/M2 | RESPIRATION RATE: 16 BRPM

## 2019-06-07 DIAGNOSIS — Z46.89 ENCOUNTER FOR CAST CHECK: Primary | ICD-10-CM

## 2019-06-07 DIAGNOSIS — S62.399D: Primary | ICD-10-CM

## 2019-06-07 PROCEDURE — 99282 EMERGENCY DEPT VISIT SF MDM: CPT

## 2019-06-07 NOTE — ED PROVIDER NOTES
[address] [place] [address] ED  [facility]      Pt Name: Gosia House  MRN: [id number]  [place] 1990  Date ofevaluation: 6/7/2019  Provider: Tamar Narayanan PA-C    CHIEF COMPLAINT       Chief Complaint   Patient presents with    Cast Problem     lt foot         HISTORY OF PRESENT ILLNESS  (Location/Symptom, Timing/Onset, Context/Setting, Quality, Duration, Modifying Factors, Severity.)   Gosia House is a 25 y.o. male who presents to the emergency department with left leg cast problem. Cast was applied by podiatrist 2 days ago. Patient states that the cast is not hurting his foot but it is causing him to keep his knee hyperextended which is bothering his knee. He would to like to have the cast cut down. No fevers, chills, nausea, vomiting. Nursing Notes were reviewed. ALLERGIES     Seasonal    CURRENT MEDICATIONS       Discharge Medication List as of 6/7/2019  5:34 PM      CONTINUE these medications which have NOT CHANGED    Details   oxyCODONE-acetaminophen (PERCOCET) 5-325 MG per tablet Take 1 tablet by mouth every 6 hours as needed for Pain for up to 7 days. Intended supply: 7 days. Take lowest dose possible to manage pain, Disp-28 tablet, R-0Print      HYDROcodone-acetaminophen (NORCO) 5-325 MG per tablet Take 1 tablet by mouth every 4 hours as needed for Pain for up to 7 days. Intended supply: 7 days.  Take lowest dose possible to manage pain, Disp-30 tablet, R-0Print      ibuprofen (ADVIL;MOTRIN) 800 MG tablet Take 1 tablet by mouth every 8 hours as needed for Pain, Disp-30 tablet, R-0Print             PAST MEDICAL HISTORY         Diagnosis Date    Diarrhea     Resolved (Written 05/14/2019)    Eczema     Lazy eye, right     Poor historian        SURGICAL HISTORY           Procedure Laterality Date    BUNIONECTOMY Right 12/1/2017    FOOT BUNIONECTOMY OLIVER / HAMMERTOE REPAIR 2ND TOE & MPJ RELEASE 2ND METATARSAL performed by Yamile Cisse DPM at [address] [place] [address] BUNIONECTOMY Bilateral 5/16/2019    LEFT FOOT LAPIDUS BUNIONECTOMY AND 2ND METATARSAL 2ND DIGIT ARTHROPLASTY, RIGHT 2ND METATARSAL CONDYLECTOMY performed by Jori Lewis DPM at [facility] Right     removed part of foreign body    FOOT SURGERY Bilateral 05/16/2019     LEFT FOOT LAPIDUS BUNIONECTOMY AND 2ND METATARSAL 2ND DIGIT ARTHROPLASTY, RIGHT 2ND METATARSAL CONDYLECTOMY (Bilateral )    FRACTURE SURGERY Right 5/31/2019    RIGHT  HAND CRPP, RIGHT 3RD, 4TH, 5TH METOCARPAL DISLOCATION performed by Tyrone Hardwick DO at [address] [place] [address] HAND SURGERY Right 05/31/2019    RIGHT  HAND CRPP, RIGHT 3RD, 4TH, 5TH          FAMILY HISTORY           Adopted: Yes   Family history unknown: Yes     No family status information on file. SOCIAL HISTORY      reports that he has been smoking cigarettes. He started smoking about 6 years ago. He has a 1.00 pack-year smoking history. He has never used smokeless tobacco. He reports that he has current or past drug history. Drug: Marijuana. Frequency: 7.00 times per week. REVIEW OFSYSTEMS    (2-9 systems for level 4, 10 or more for level 5)   Review of Systems    Except as noted above the remainder of the review of systems was reviewed and negative. PHYSICAL EXAM    (up to 7 for level 4, 8 or more for level 5)     ED Triage Vitals   BP Temp Temp Source Pulse Resp SpO2 Height Weight   06/07/19 1541 06/07/19 1541 06/07/19 1541 06/07/19 1541 06/07/19 1541 06/07/19 1541 06/07/19 1633 06/07/19 1633   123/61 98.2 °F (36.8 °C) Oral 78 16 98 % 5' 8\" (1.727 m) 175 lb (79.4 kg)      Physical Exam   Constitutional: He is oriented to person, place, and time. He appears well-developed. HENT:   Head: Normocephalic and atraumatic. Eyes: EOM are normal.   Neck: Normal range of motion. Neck supple. Musculoskeletal: Normal range of motion. Left leg has no signs of neurovascular compromise. Full range of motion of left knee.    Neurological: He is alert and oriented to person, place, and time. Skin: Skin is warm. Psychiatric: He has a normal mood and affect. His behavior is normal.               DIAGNOSTIC RESULTS     EKG: All EKG's are interpreted by the Emergency Department Physician who either signs or Co-signs this chart in the absence of a cardiologist.        RADIOLOGY:   Non-plain film images such as CT, Ultrasound and MRI are read by the radiologist. Plain radiographic images arevisualized and preliminarily interpreted by the emergency physician with the below findings:        Interpretation per the Radiologist below, if available at thetime of this note:          ED BEDSIDE ULTRASOUND:   Performed by ED Physician - none    LABS:  Labs Reviewed - No data to display    All other labs were within normal range or not returned as of this dictation. EMERGENCY DEPARTMENT COURSE and DIFFERENTIAL DIAGNOSIS/MDM:   Vitals:    Vitals:    06/07/19 1541 06/07/19 1633   BP: 123/61    Pulse: 78    Resp: 16    Temp: 98.2 °F (36.8 °C)    TempSrc: Oral    SpO2: 98%    Weight:  175 lb (79.4 kg)   Height:  5' 8\" (1.727 m)     I spoke with covering podiatrist.  Recommendation was to not to manipulate the cast.  It does appear to be appropriately placed. Patient was told this and stormed out of the ER in anger      5:36 PM  He left without discharge instructions. CONSULTS:  IP CONSULT TO PODIATRY    PROCEDURES:  Procedures        FINAL IMPRESSION      1.  Encounter for cast check          DISPOSITION/PLAN   DISPOSITION Decision To Discharge 06/07/2019 05:35:50 PM      PATIENTREFERRED TO:   Marysol Dalton, [address]    In 3 days        DISCHARGE MEDICATIONS:     Discharge Medication List as of 6/7/2019  5:34 PM              (Please note that portions of this note were completed with a voice recognition program.  Efforts were made to edit thedictations but occasionally words are mis-transcribed.)    GABRIELE Robledo Cari Marte PA-C  06/07/19 [address] Cari Marte PA-C  06/07/19 [id number]

## 2019-06-07 NOTE — ED PROVIDER NOTES
Attending Supervisory Note/Shared Visit   I have personally performed a face to face diagnostic evaluation on this patient. I have reviewed the mid-levels findings and agree.         (Please note that portions of this note were completed with a voice recognition program.  Efforts were made to edit the dictations but occasionally words are mis-transcribed.)    Erick Liang MD  Attending Emergency Physician      Erick Liang MD  06/07/19 [id number]

## 2019-06-11 PROBLEM — M21.612 BUNION, LEFT: Status: ACTIVE | Noted: 2017-12-01

## 2019-06-18 ENCOUNTER — OFFICE VISIT (OUTPATIENT)
Dept: ORTHOPEDIC SURGERY | Age: 29
End: 2019-06-18

## 2019-06-18 VITALS
HEART RATE: 69 BPM | DIASTOLIC BLOOD PRESSURE: 67 MMHG | HEIGHT: 69 IN | BODY MASS INDEX: 25.92 KG/M2 | WEIGHT: 175 LBS | SYSTOLIC BLOOD PRESSURE: 114 MMHG

## 2019-06-18 DIAGNOSIS — S62.399D: Primary | ICD-10-CM

## 2019-06-18 PROCEDURE — 99024 POSTOP FOLLOW-UP VISIT: CPT | Performed by: PHYSICIAN ASSISTANT

## 2019-06-18 ASSESSMENT — ENCOUNTER SYMPTOMS
COLOR CHANGE: 0
APNEA: 0
DIARRHEA: 0
VOMITING: 0
COUGH: 0
CONSTIPATION: 0
ABDOMINAL DISTENTION: 0
NAUSEA: 0
SHORTNESS OF BREATH: 0
CHEST TIGHTNESS: 0
RESPIRATORY NEGATIVE: 1
ABDOMINAL PAIN: 0

## 2019-06-18 NOTE — PROGRESS NOTES
[facility]  [address] [address]  Dept: [phone]  Dept Fax: [phone]        Post Operative Follow Up    Subjective:     Chief Complaint   Patient presents with    Post-Op Check     RT ORIF DOS 5/31/19. Taking Ibuprofen. Post Op Surgery:     The patient is here for a follow up after having a Examination under anesthesia of right hand. And Closed reduction and percutaneous pinning of the right third, fourth, fifth carpometacarpal joints. The date of surgery was on 5/31/19 . Therefore we are 18 days post op. Currently the patient's pain is controlled with ibuprofen as needed. Physical therapy was not started. Patient presents today with a Splint that is in good repair. Review of Systems   Constitutional: Positive for activity change. Negative for appetite change, fatigue and fever. Respiratory: Negative. Negative for apnea, cough, chest tightness and shortness of breath. Cardiovascular: Negative. Negative for chest pain, palpitations and leg swelling. Gastrointestinal: Negative for abdominal distention, abdominal pain, constipation, diarrhea, nausea and vomiting. Genitourinary: Negative for difficulty urinating, dysuria and hematuria. Musculoskeletal: Positive for arthralgias. Negative for gait problem, joint swelling and myalgias. Skin: Negative for color change and rash. Neurological: Negative for dizziness, weakness, numbness and headaches. Psychiatric/Behavioral: Positive for sleep disturbance. I have reviewed the CC, HPI, ROS, PMH, FHX, Social History, and if not present in this note, I have reviewed in the patient's chart. I agree with the documentation provided by other staff and have reviewed their documentation prior to providing my signature indicating agreement.     Vitals:   /67   Pulse 69   Ht 5' 9\" (1.753 m)   Wt 175 lb (79.4 kg)   BMI 25.84 kg/m²  Body mass index is 25.84 kg/m². Physical Examination:     Orthopedics:    GENERAL: Alert and oriented X3 in no acute distress. SKIN: Intact without lesions or ulcerations. NEURO: Musculoskeletal and axillary nerves intact to sensory and motor testing. VASC: Capillary refill is less than 3 seconds. Post Op Exam:    LOCATION: Right hand  SITE: Distal neurocirculatory status is intact. EXAM: Sensation is intact to light touch, there is full motor function of the extremity,   PALP: pain to palpation of the metacarpals  ROM: fingers are moving well    Procedures:     Procedure: yes   cast applied    Radiology:   See separate report    Assessment:     1. Closed traumatic displaced fracture of multiple metacarpal bones with routine healing, subsequent encounter      Plan:   Post Op Treatment : Patient had the treatment regimen reviewed today 18 days s/p Examination under anesthesia of right hand And Closed reduction and percutaneous pinning of the right third, fourth, fifth carpometacarpal joints. I reviewed the films with the patient. We discussed some of the etiologies and natural histories of a PERC pinning of the right hand. We also discussed the various treatment alternatives including anti-inflammatory medications, physical therapy, injections, further imaging studies and as a last resort surgery. During today's visit, he asked for more narcotic pain medicine and I told him that Tylenol should be sufficient and we will not write any more narcotic pain medication. He was placed in a short arm clamdigger cast for the next 2 weeks when he will return and we will take the cast off and most likely take out the pins. The patient should return to the office in 2 weeks with pre-clinic x-rays cast off to f/u with Amilcar Ardon D.O. . The patient will call the office immediately with any problems that may arise. No orders of the defined types were placed in this encounter.       Orders Placed This Encounter Procedures    XR HAND RIGHT (MIN 3 VIEWS)     Standing Status:   Future     Number of Occurrences:   1     Standing Expiration Date:   6/18/2020     Order Specific Question:   Reason for exam:     Answer:   post op       I, Laura Stahl PA-C, ATC,  have personally seen this patient, reviewed the chart including history, and imaging if done. I personally  performed the physical exam and obtained any needed additional history. I placed orders, performed or supervised procedures and developed the treatment plan.     Electronically signed by Pennie Rodgers PA-C, on 6/18/2019 at 4:09 PM      Electronically signed by Pennie Rodgers PA-C, on 6/18/2019 at 4:00 PM

## 2019-06-19 ENCOUNTER — HOSPITAL ENCOUNTER (EMERGENCY)
Age: 29
Discharge: HOME | End: 2019-06-19
Attending: EMERGENCY MEDICINE
Payer: MEDICARE

## 2019-06-19 VITALS
HEIGHT: 68 IN | RESPIRATION RATE: 16 BRPM | OXYGEN SATURATION: 99 % | WEIGHT: 180.7 LBS | TEMPERATURE: 98.1 F | SYSTOLIC BLOOD PRESSURE: 139 MMHG | HEART RATE: 62 BPM | DIASTOLIC BLOOD PRESSURE: 76 MMHG | BODY MASS INDEX: 27.38 KG/M2

## 2019-06-19 DIAGNOSIS — G89.18 POST-OP PAIN: Primary | ICD-10-CM

## 2019-06-19 PROCEDURE — 99282 EMERGENCY DEPT VISIT SF MDM: CPT

## 2019-06-19 RX ORDER — HYDROCODONE BITARTRATE AND ACETAMINOPHEN 5; 325 MG/1; MG/1
1 TABLET ORAL EVERY 6 HOURS PRN
Qty: 12 TABLET | Refills: 0 | Status: SHIPPED | OUTPATIENT
Start: 2019-06-19 | End: 2019-06-22

## 2019-06-19 ASSESSMENT — PAIN DESCRIPTION - PAIN TYPE: TYPE: ACUTE PAIN

## 2019-06-19 ASSESSMENT — PAIN DESCRIPTION - LOCATION: LOCATION: HAND

## 2019-06-19 ASSESSMENT — PAIN - FUNCTIONAL ASSESSMENT: PAIN_FUNCTIONAL_ASSESSMENT: PREVENTS OR INTERFERES SOME ACTIVE ACTIVITIES AND ADLS

## 2019-06-19 ASSESSMENT — PAIN DESCRIPTION - ORIENTATION: ORIENTATION: RIGHT

## 2019-06-19 ASSESSMENT — PAIN SCALES - GENERAL: PAINLEVEL_OUTOF10: 8

## 2019-06-19 ASSESSMENT — PAIN DESCRIPTION - DESCRIPTORS: DESCRIPTORS: DISCOMFORT

## 2019-06-19 NOTE — ED NOTES
Patient presents to ED via private auto c/o rt hand pain. Patient seen yesterday for f/u and a hard cast was put on instead of soft splint. Patient awoke this a.m. With pain in hand where cast is pushing on pins in hand patient is post op and tylenol and motrin are providing no relief.       Soniya Gloria RN  06/19/19 9111

## 2019-06-19 NOTE — ED PROVIDER NOTES
[facility] ED  eMERGENCY dEPARTMENT eNCOUnter      Pt Name: Eddie Nair  MRN: [id number]  [place] 1990  Date of evaluation: 6/19/2019  Provider: Torrey Quintana MD    CHIEF COMPLAINT     Chief Complaint   Patient presents with    Hand Pain     Right, hand hard cast placed yesterday. Hardware surgery here recently         HISTORY OF PRESENT ILLNESS   (Location/Symptom, Timing/Onset, Context/Setting,Quality, Duration, Modifying Factors, Severity)  Note limiting factors. Eddie Nair is a 25 y.o. male who presents to the emergency department with chief complaint of right hand pain. Almost 3 weeks ago patient sustained injury to the right hand with multiple metacarpal fractures and carpometacarpal dislocations. He had surgery on his hand yesterday and pins were inserted to stabilize the fractures. He has a short arm cast.  Patient comes in tonight because he is experiencing pain in his hand that is not relieved with oral ibuprofen. He has been keeping the arm in a dependent position most of the time. The history is provided by the patient. Nursing Notes werereviewed. REVIEW OF SYSTEMS    (2-9 systems for level 4, 10 or more for level 5)     Review of Systems   All other systems reviewed and are negative. Except as noted above the remainder of the review of systems was reviewed and negative.        PAST MEDICAL HISTORY     Past Medical History:   Diagnosis Date    Diarrhea     Resolved (Written 05/14/2019)    Eczema     Lazy eye, right     Poor historian          SURGICALHISTORY       Past Surgical History:   Procedure Laterality Date    BUNIONECTOMY Right 12/1/2017    FOOT BUNIONECTOMY OLIVER / HAMMERTOE REPAIR 2ND TOE & MPJ RELEASE 2ND METATARSAL performed by Della Bates DPM at [address] [facility] Bilateral 5/16/2019    LEFT FOOT LAPIDUS BUNIONECTOMY AND 2ND METATARSAL 2ND DIGIT ARTHROPLASTY, RIGHT 2ND METATARSAL CONDYLECTOMY performed by Sun Acosta DPM at [address] [facility] SURGERY Right     removed part of foreign body    FOOT SURGERY Bilateral 05/16/2019     LEFT FOOT LAPIDUS BUNIONECTOMY AND 2ND METATARSAL 2ND DIGIT ARTHROPLASTY, RIGHT 2ND METATARSAL CONDYLECTOMY (Bilateral )    FRACTURE SURGERY Right 5/31/2019    RIGHT  HAND CRPP, RIGHT 3RD, 4TH, 5TH METOCARPAL DISLOCATION performed by Lizbeth Velasquez DO at [address] [place] [address] HAND SURGERY Right 05/31/2019    RIGHT  HAND CRPP, RIGHT 3RD, 4TH, 5TH          CURRENT MEDICATIONS       Previous Medications    IBUPROFEN (ADVIL;MOTRIN) 800 MG TABLET    Take 1 tablet by mouth every 8 hours as needed for Pain       ALLERGIES     Seasonal    FAMILY HISTORY       Family History   Adopted: Yes   Family history unknown: Yes          SOCIAL HISTORY       Social History     Socioeconomic History    Marital status: Single     Spouse name: None    Number of children: None    Years of education: None    Highest education level: None   Occupational History    None   Social Needs    Financial resource strain: None    Food insecurity:     Worry: None     Inability: None    Transportation needs:     Medical: None     Non-medical: None   Tobacco Use    Smoking status: Current Every Day Smoker     Packs/day: 0.25     Years: 4.00     Pack years: 1.00     Types: Cigarettes     Start date: 11/17/2012    Smokeless tobacco: Never Used    Tobacco comment: 3-4 cig. a day   Substance and Sexual Activity    Alcohol use: None     Comment: Rare    Drug use: Yes     Frequency: 7.0 times per week     Types: Marijuana    Sexual activity: Yes     Partners: Female   Lifestyle    Physical activity:     Days per week: None     Minutes per session: None    Stress: None   Relationships    Social connections:     Talks on phone: None     Gets together: None     Attends [employer] service: None     Active member of club or organization: None     Attends meetings of clubs or organizations: None     Relationship status: None    Intimate partner violence:     Fear of current or ex partner: None     Emotionally abused: None     Physically abused: None     Forced sexual activity: None   Other Topics Concern    None   Social History Narrative    None       SCREENINGS             PHYSICAL EXAM    (up to 7 for level 4, 8 or more for level 5)     ED Triage Vitals [06/19/19 0534]   BP Temp Temp Source Pulse Resp SpO2 Height Weight   139/76 98.1 °F (36.7 °C) Oral 56 16 99 % 5' 8\" (1.727 m) 180 lb 11.2 oz (82 kg)       Physical Exam   Constitutional: He appears well-developed and well-nourished. No distress. Musculoskeletal:   The right arm is enclosed in a fiberglass short arm cast.  Fingertips are visible and there is good capillary refill. Skin: Skin is warm and dry. Nursing note and vitals reviewed. DIAGNOSTIC RESULTS     EKG: All EKG's are interpreted by the Emergency Department Physician who either signs orCo-signs this chart in the absence of a cardiologist.    RADIOLOGY:   Non-plain film images such as CT, Ultrasound and MRI are read by the radiologist. Plain radiographic images are visualized and preliminarily interpreted by the emergency physician with the below findings:    Interpretation per the Radiologist below, ifavailable at the time of this note:    No orders to display         ED BEDSIDE ULTRASOUND:   Performed by ED Physician - none    LABS:  Labs Reviewed - No data to display    All other labs were within normal range ornot returned as of this dictation. EMERGENCY DEPARTMENT COURSE and DIFFERENTIAL DIAGNOSIS/MDM:   Vitals:    Vitals:    06/19/19 0534 06/19/19 0536   BP: 139/76    Pulse: 56 62   Resp: 16    Temp: 98.1 °F (36.7 °C)    TempSrc: Oral    SpO2: 99%    Weight: 180 lb 11.2 oz (82 kg)    Height: 5' 8\" (1.727 m)        Patient is provided a prescription for Norco tablets every 6 hours as needed. #12. He is to contact his orthopedic surgeon for further management.     MDM    CONSULTS:  None    PROCEDURES:  Unlessotherwise noted below, none Procedures    FINAL IMPRESSION      1. 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 ED Medicationsadministered this visit:  Medications - No data to display    New Prescriptions from this visit:    New Prescriptions    HYDROCODONE-ACETAMINOPHEN (NORCO) 5-325 MG PER TABLET    Take 1 tablet by mouth every 6 hours as needed for Pain for up to 3 days. Follow-up:  Andi Pat, [address] [facility]  [phone]              Final Impression:   1.  Post-op pain               (Please note that portions of this note were completed with a voice recognitionprogram.  Efforts were made to edit the dictations but occasionally words are mis-transcribed.)    Dante Merlin, MD (electronically signed)  Attending Emergency Physician            Dante Merlin, MD  06/19/19 9570

## 2019-07-01 DIAGNOSIS — S62.399D: Primary | ICD-10-CM

## 2019-07-08 ENCOUNTER — OFFICE VISIT (OUTPATIENT)
Dept: ORTHOPEDIC SURGERY | Age: 29
End: 2019-07-08

## 2019-07-08 VITALS
HEART RATE: 75 BPM | BODY MASS INDEX: 27.13 KG/M2 | WEIGHT: 179 LBS | RESPIRATION RATE: 20 BRPM | DIASTOLIC BLOOD PRESSURE: 67 MMHG | SYSTOLIC BLOOD PRESSURE: 115 MMHG | HEIGHT: 68 IN

## 2019-07-08 DIAGNOSIS — S63.054A DISLOCATION OF CARPOMETACARPAL JOINT OF RIGHT HAND, INITIAL ENCOUNTER: Primary | ICD-10-CM

## 2019-07-08 PROCEDURE — G8419 CALC BMI OUT NRM PARAM NOF/U: HCPCS | Performed by: ORTHOPAEDIC SURGERY

## 2019-07-08 PROCEDURE — 99024 POSTOP FOLLOW-UP VISIT: CPT | Performed by: ORTHOPAEDIC SURGERY

## 2019-07-08 PROCEDURE — G8427 DOCREV CUR MEDS BY ELIG CLIN: HCPCS | Performed by: ORTHOPAEDIC SURGERY

## 2019-07-08 PROCEDURE — 4004F PT TOBACCO SCREEN RCVD TLK: CPT | Performed by: ORTHOPAEDIC SURGERY

## 2019-07-08 ASSESSMENT — ENCOUNTER SYMPTOMS
APNEA: 0
ABDOMINAL PAIN: 0
VOMITING: 0
DIARRHEA: 0
CONSTIPATION: 0
CHEST TIGHTNESS: 0
SHORTNESS OF BREATH: 0
ABDOMINAL DISTENTION: 0
NAUSEA: 0
COLOR CHANGE: 0
COUGH: 0

## 2019-07-08 NOTE — PROGRESS NOTES
[facility]  [facility] [address] [place] [address]  Dept: [phone]  Dept Fax: [phone]        Post Operative Follow Up    Subjective:     Chief Complaint   Patient presents with    Post-Op Check     DOS 05/31/19 Fx Right hand Metacarpal 3 4 5      Post Op Surgery:     The patient is here for a follow up after having a Examination under anesthesia of right hand and Closed reduction and percutaneous pinning of the right third, fourth, fifth carpometacarpal joints. The date of surgery was on 05/31/19. Therefore we are 5 weeks and 3 days post op. Currently the patient's pain is controlled with Ibuprofen. Physical therapy was not started. Patient presents today with a short arm cast that is in good repair. Review of Systems   Constitutional: Positive for activity change. Negative for appetite change. Respiratory: Negative for apnea, cough, chest tightness and shortness of breath. Cardiovascular: Negative for chest pain, palpitations and leg swelling. Gastrointestinal: Negative for abdominal distention, abdominal pain, constipation, diarrhea, nausea and vomiting. Genitourinary: Negative for difficulty urinating, dysuria and hematuria. Musculoskeletal: Positive for arthralgias and joint swelling. Negative for gait problem and myalgias. Skin: Negative for color change and rash. Neurological: Negative for dizziness, weakness, numbness and headaches. Psychiatric/Behavioral: Negative for sleep disturbance. I have reviewed the CC, HPI, ROS, PMH, FHX, Social History, and if not present in this note, I have reviewed in the patient's chart. I agree with the documentation provided by other staff and have reviewed their documentation prior to providing my signature indicating agreement.     Vitals:   /67   Pulse 75   Resp 20   Ht 5' 8\" (1.727 m)   Wt 179 lb (81.2 kg)   BMI 27.22 kg/m²  Body mass index is 27.22 he should attend hand therapy to help combat the stiffness that he has in his hand and the third, fourth and fifth digits. The patient then stated that he agrees with the plan of care and has opted for a hand therapy script. Patient should return to the office in 4-6 weeks to f/u with Estuardo Livingston PA-C, ATC and at that visit we will get PC XR of the right hand. The patient will call the office immediately with any problems that may arise. No orders of the defined types were placed in this encounter. Orders Placed This Encounter   Procedures    External Referral To Physical Therapy     Referral Priority:   Routine     Referral Type:   Eval and Treat     Referral Reason:   Specialty Services Required     Requested Specialty:   Physical Therapy     Number of Visits Requested:   1     Mike FELICIANO am scribing for and in the presence of Adrien Fink D.O. 7/9/2019  3:55 PM      Adrien FELICIANO DO, have personally seen this patient, reviewed the chart including history, and imaging if done. I personally  performed the physical exam and obtained any needed additional history. I placed orders, performed or supervised procedures and developed the treatment plan.       Electronically signed by Dottie Talamantes DO, on 7/9/2019 at 3:55 PM

## 2019-07-09 ENCOUNTER — TELEPHONE (OUTPATIENT)
Dept: ORTHOPEDIC SURGERY | Age: 29
End: 2019-07-09

## 2019-07-09 NOTE — TELEPHONE ENCOUNTER
Patient called today stating that he just left urgent care cause he is in so much pain since yesterday and then asked patient if there was any drainage from incision were we just pulled pins he states no and states that he is in pain and told patient that we had just removed cast and that he will have to give it time and he can rotate between ibuprofen and tylenol then he told me to fuck off and that we are not doing enough for him again offered appt with Davi Wilson today he refused and then kept on saying that he is in fucking pain that you fucking people don't care about him and then hung up phone on me     How would you like to address this ?

## 2019-07-12 ENCOUNTER — TELEPHONE (OUTPATIENT)
Dept: ORTHOPEDIC SURGERY | Age: 29
End: 2019-07-12

## 2019-07-12 NOTE — TELEPHONE ENCOUNTER
He wasn't scheduled to come back until 8/20/19, so I rescheduled him for the end of July per his request.

## 2019-07-17 ENCOUNTER — OFFICE VISIT (OUTPATIENT)
Dept: PODIATRY | Age: 29
End: 2019-07-17

## 2019-07-17 VITALS — BODY MASS INDEX: 28.77 KG/M2 | WEIGHT: 179 LBS | HEIGHT: 66 IN | RESPIRATION RATE: 16 BRPM

## 2019-07-17 DIAGNOSIS — Z98.890 POST-OPERATIVE STATE: Primary | ICD-10-CM

## 2019-07-17 PROCEDURE — 99024 POSTOP FOLLOW-UP VISIT: CPT | Performed by: PODIATRIST

## 2019-07-29 ENCOUNTER — TELEPHONE (OUTPATIENT)
Dept: ORTHOPEDIC SURGERY | Age: 29
End: 2019-07-29

## 2019-08-01 ENCOUNTER — OFFICE VISIT (OUTPATIENT)
Dept: PODIATRY | Age: 29
End: 2019-08-01
Payer: MEDICARE

## 2019-08-01 VITALS — HEIGHT: 66 IN | BODY MASS INDEX: 28.77 KG/M2 | WEIGHT: 179 LBS

## 2019-08-01 DIAGNOSIS — M79.604 PAIN IN BOTH LOWER EXTREMITIES: ICD-10-CM

## 2019-08-01 DIAGNOSIS — Z98.890 POST-OPERATIVE STATE: Primary | ICD-10-CM

## 2019-08-01 DIAGNOSIS — M21.41 PES PLANUS OF BOTH FEET: ICD-10-CM

## 2019-08-01 DIAGNOSIS — M79.605 PAIN IN BOTH LOWER EXTREMITIES: ICD-10-CM

## 2019-08-01 DIAGNOSIS — M21.42 PES PLANUS OF BOTH FEET: ICD-10-CM

## 2019-08-01 DIAGNOSIS — D23.71 BENIGN NEOPLASM OF SKIN OF RIGHT FOOT: ICD-10-CM

## 2019-08-01 PROCEDURE — 4004F PT TOBACCO SCREEN RCVD TLK: CPT | Performed by: PODIATRIST

## 2019-08-01 PROCEDURE — G8427 DOCREV CUR MEDS BY ELIG CLIN: HCPCS | Performed by: PODIATRIST

## 2019-08-01 PROCEDURE — 99024 POSTOP FOLLOW-UP VISIT: CPT | Performed by: PODIATRIST

## 2019-08-01 PROCEDURE — 17110 DESTRUCTION B9 LES UP TO 14: CPT | Performed by: PODIATRIST

## 2019-08-01 PROCEDURE — G8419 CALC BMI OUT NRM PARAM NOF/U: HCPCS | Performed by: PODIATRIST

## 2019-08-06 ENCOUNTER — TELEPHONE (OUTPATIENT)
Dept: FAMILY MEDICINE CLINIC | Age: 29
End: 2019-08-06

## 2019-08-06 ENCOUNTER — OFFICE VISIT (OUTPATIENT)
Dept: FAMILY MEDICINE CLINIC | Age: 29
End: 2019-08-06
Payer: MEDICARE

## 2019-08-06 VITALS
BODY MASS INDEX: 29.86 KG/M2 | HEIGHT: 66 IN | OXYGEN SATURATION: 100 % | DIASTOLIC BLOOD PRESSURE: 71 MMHG | HEART RATE: 70 BPM | WEIGHT: 185.8 LBS | SYSTOLIC BLOOD PRESSURE: 120 MMHG

## 2019-08-06 DIAGNOSIS — M77.41 METATARSALGIA OF RIGHT FOOT: ICD-10-CM

## 2019-08-06 DIAGNOSIS — M20.41 HAMMERTOE OF SECOND TOE OF RIGHT FOOT: Primary | ICD-10-CM

## 2019-08-06 DIAGNOSIS — Z98.890 S/P BILATERAL FOOT SURGERY: ICD-10-CM

## 2019-08-06 DIAGNOSIS — H53.001 LAZY EYE, RIGHT: ICD-10-CM

## 2019-08-06 DIAGNOSIS — M79.671 PAIN IN BOTH FEET: ICD-10-CM

## 2019-08-06 DIAGNOSIS — M21.42 BILATERAL PES PLANUS: ICD-10-CM

## 2019-08-06 DIAGNOSIS — M21.41 BILATERAL PES PLANUS: ICD-10-CM

## 2019-08-06 DIAGNOSIS — M79.672 PAIN IN BOTH FEET: ICD-10-CM

## 2019-08-06 DIAGNOSIS — L84 CALLUS OF FOOT: ICD-10-CM

## 2019-08-06 PROCEDURE — G8419 CALC BMI OUT NRM PARAM NOF/U: HCPCS | Performed by: FAMILY MEDICINE

## 2019-08-06 PROCEDURE — G8427 DOCREV CUR MEDS BY ELIG CLIN: HCPCS | Performed by: FAMILY MEDICINE

## 2019-08-06 PROCEDURE — 4004F PT TOBACCO SCREEN RCVD TLK: CPT | Performed by: FAMILY MEDICINE

## 2019-08-06 PROCEDURE — 99214 OFFICE O/P EST MOD 30 MIN: CPT | Performed by: FAMILY MEDICINE

## 2019-08-06 RX ORDER — DULOXETIN HYDROCHLORIDE 20 MG/1
20 CAPSULE, DELAYED RELEASE ORAL DAILY
Qty: 60 CAPSULE | Refills: 0 | Status: ON HOLD | OUTPATIENT
Start: 2019-08-06 | End: 2020-11-20

## 2019-08-06 RX ORDER — OXCARBAZEPINE 600 MG/1
600 TABLET, FILM COATED ORAL 2 TIMES DAILY
Status: ON HOLD | COMMUNITY
End: 2020-11-20

## 2019-08-06 ASSESSMENT — ENCOUNTER SYMPTOMS
NAUSEA: 0
ABDOMINAL PAIN: 0
CONSTIPATION: 0
ABDOMINAL DISTENTION: 0
COLOR CHANGE: 0
SINUS PRESSURE: 0
VOMITING: 0
EYE PAIN: 0
SHORTNESS OF BREATH: 0
VOICE CHANGE: 0
EYE DISCHARGE: 0
CHEST TIGHTNESS: 0
BACK PAIN: 0
SORE THROAT: 0
DIARRHEA: 0
RHINORRHEA: 0

## 2019-08-06 ASSESSMENT — PATIENT HEALTH QUESTIONNAIRE - PHQ9
SUM OF ALL RESPONSES TO PHQ QUESTIONS 1-9: 1
SUM OF ALL RESPONSES TO PHQ9 QUESTIONS 1 & 2: 1
SUM OF ALL RESPONSES TO PHQ QUESTIONS 1-9: 1
1. LITTLE INTEREST OR PLEASURE IN DOING THINGS: 0
2. FEELING DOWN, DEPRESSED OR HOPELESS: 1

## 2019-08-06 NOTE — PROGRESS NOTES
Visit Information    Have you changed or started any medications since your last visit including any over-the-counter medicines, vitamins, or herbal medicines? no   Are you having any side effects from any of your medications? -  no  Have you stopped taking any of your medications? Is so, why? -  no    Have you seen any other physician or provider since your last visit? Yes - Records Obtained  Have you had any other diagnostic tests since your last visit? Yes - Records Obtained  Have you been seen in the emergency room and/or had an admission to a hospital since we last saw you? Yes - Records Obtained  Have you had your routine dental cleaning in the past 6 months? no    Have you activated your [employer] account? If not, what are your barriers?  No:      Patient Care Team:  Annette Beckman MD as PCP - General (Family Medicine)  Annet[facility] Beckman MD [facility]CP [facility]  Saba Jacobs DPM as Physician (Podiatry)  Kathrine Cruz DPM as Physician (Podiatry)    Medical History Review  Past Medical, Family, and Social History reviewed and does contribute to the patient presenting condition    Health Maintenance   Topic Date Due    Pneumococcal 0-64 years Vaccine (1 of 1 - PPSV23) 07/30/1994    Varicella Vaccine (1 of 2 - 13+ 2-dose series) 07/30/2001    DTaP/Tdap/Td vaccine (1 - Tdap) 11/30/2019 (Originally 7/30/2007)    Flu vaccine (1) 02/29/2020 (Originally 9/1/2019)    HIV screen  Completed

## 2019-10-21 ENCOUNTER — OFFICE VISIT (OUTPATIENT)
Dept: PODIATRY | Age: 29
End: 2019-10-21
Payer: MEDICARE

## 2019-10-21 VITALS — HEIGHT: 66 IN | BODY MASS INDEX: 29.73 KG/M2 | RESPIRATION RATE: 16 BRPM | WEIGHT: 185 LBS

## 2019-10-21 DIAGNOSIS — R26.2 DIFFICULTY WALKING: ICD-10-CM

## 2019-10-21 DIAGNOSIS — M79.672 BILATERAL FOOT PAIN: Primary | ICD-10-CM

## 2019-10-21 DIAGNOSIS — M79.671 BILATERAL FOOT PAIN: Primary | ICD-10-CM

## 2019-10-21 DIAGNOSIS — D23.71 BENIGN NEOPLASM OF SKIN OF RIGHT FOOT: ICD-10-CM

## 2019-10-21 PROCEDURE — G8428 CUR MEDS NOT DOCUMENT: HCPCS | Performed by: PODIATRIST

## 2019-10-21 PROCEDURE — G8484 FLU IMMUNIZE NO ADMIN: HCPCS | Performed by: PODIATRIST

## 2019-10-21 PROCEDURE — 17110 DESTRUCTION B9 LES UP TO 14: CPT | Performed by: PODIATRIST

## 2019-10-21 PROCEDURE — 99213 OFFICE O/P EST LOW 20 MIN: CPT | Performed by: PODIATRIST

## 2019-10-21 PROCEDURE — G8419 CALC BMI OUT NRM PARAM NOF/U: HCPCS | Performed by: PODIATRIST

## 2019-10-21 PROCEDURE — 4004F PT TOBACCO SCREEN RCVD TLK: CPT | Performed by: PODIATRIST

## 2019-12-09 ENCOUNTER — TELEPHONE (OUTPATIENT)
Dept: FAMILY MEDICINE CLINIC | Age: 29
End: 2019-12-09

## 2020-05-06 ENCOUNTER — OFFICE VISIT (OUTPATIENT)
Dept: PODIATRY | Age: 30
End: 2020-05-06
Payer: MEDICARE

## 2020-05-06 VITALS — HEIGHT: 66 IN | RESPIRATION RATE: 16 BRPM | WEIGHT: 185 LBS | BODY MASS INDEX: 29.73 KG/M2 | TEMPERATURE: 98.7 F

## 2020-05-06 PROCEDURE — G8419 CALC BMI OUT NRM PARAM NOF/U: HCPCS | Performed by: PODIATRIST

## 2020-05-06 PROCEDURE — 4004F PT TOBACCO SCREEN RCVD TLK: CPT | Performed by: PODIATRIST

## 2020-05-06 PROCEDURE — 99213 OFFICE O/P EST LOW 20 MIN: CPT | Performed by: PODIATRIST

## 2020-05-06 PROCEDURE — G8427 DOCREV CUR MEDS BY ELIG CLIN: HCPCS | Performed by: PODIATRIST

## 2020-05-06 PROCEDURE — 11721 DEBRIDE NAIL 6 OR MORE: CPT | Performed by: PODIATRIST

## 2020-05-06 PROCEDURE — 17110 DESTRUCTION B9 LES UP TO 14: CPT | Performed by: PODIATRIST

## 2020-05-06 NOTE — PROGRESS NOTES
Left    Thickness  [x] [x] [x] [x] [x] [x] [x] [x] [x] [x]  5 4 3 2 1 1 2 3 4 5                         Right                                        Left    Dystrophic Changes   [x] [x] [x] [x] [x] [x] [x] [x] [x] [x]  5 4 3 2 1 1 2 3 4 5                         Right                                        Left    Color  [x] [x] [x] [x] [x] [x] [x] [x] [x] [x]  5 4 3 2 1 1 2 3 4 5                          Right                                        Left    Incurvation/Ingrowin   [] [] [] [] [] [] [] [] [] []  5 4 3 2 1 1 2 3 4 5                         Right                                        Left    Inflammation/Pain   [x] [x] [x] [x] [x] [x] [x] [x] [x] [x]  5 4 3  2 1 1 2 3 4 5                         Right                                        Left       Nails are painful 1-10 with direct palpation. Q7   []Yes  []No                Q8   [x]Yes  []No                     Q9   []Yes    []No  Assessment:  30 y.o. male with:    Diagnosis Orders   1. Bilateral foot pain  33465 - [place] DEBRIDEMENT OF NAILS, 6 OR MORE    18616 - [place] DESTRUCTION BENIGN LESIONS UP TO 14   2. Difficulty walking  44301 - [place] DEBRIDEMENT OF NAILS, 6 OR MORE    35821 - [place] DESTRUCTION BENIGN LESIONS UP TO 14   3. Benign neoplasm of skin of right foot  63273 - [place] DESTRUCTION BENIGN LESIONS UP TO 14   4. Benign neoplasm of skin of lower limb, including hip, left  71446 - [place] DESTRUCTION BENIGN LESIONS UP TO 14   5. Dermatophytosis of nail  85971 - [place] DEBRIDEMENT OF NAILS, 6 OR MORE   6. Ingrown nail  30109 - [place] DEBRIDEMENT OF NAILS, 6 OR MORE         Plan:   Pt was evaluated and examined. Patient was given personalized discharge instructions. The lesion was partially excised and Pyrogallic acid was applied under occlusion. The patient will leave in place for 24-48 hours and than remove. The patient tolerated the procedure well and without complication.      Nails 1-10 were debrided in length and thickness sharply with a nail

## 2020-06-22 ENCOUNTER — OFFICE VISIT (OUTPATIENT)
Dept: PODIATRY | Age: 30
End: 2020-06-22
Payer: MEDICARE

## 2020-06-22 VITALS — RESPIRATION RATE: 16 BRPM | HEIGHT: 66 IN | BODY MASS INDEX: 29.73 KG/M2 | WEIGHT: 185 LBS | TEMPERATURE: 98.2 F

## 2020-06-22 PROCEDURE — 99213 OFFICE O/P EST LOW 20 MIN: CPT | Performed by: PODIATRIST

## 2020-06-22 PROCEDURE — 17110 DESTRUCTION B9 LES UP TO 14: CPT | Performed by: PODIATRIST

## 2020-06-22 PROCEDURE — G8428 CUR MEDS NOT DOCUMENT: HCPCS | Performed by: PODIATRIST

## 2020-06-22 PROCEDURE — 11721 DEBRIDE NAIL 6 OR MORE: CPT | Performed by: PODIATRIST

## 2020-06-22 PROCEDURE — 4004F PT TOBACCO SCREEN RCVD TLK: CPT | Performed by: PODIATRIST

## 2020-06-22 PROCEDURE — G8419 CALC BMI OUT NRM PARAM NOF/U: HCPCS | Performed by: PODIATRIST

## 2020-06-22 RX ORDER — UREA 40 %
CREAM (GRAM) TOPICAL
Qty: 1 TUBE | Refills: 3 | Status: ON HOLD | OUTPATIENT
Start: 2020-06-22 | End: 2020-11-20

## 2020-06-22 NOTE — PROGRESS NOTES
weight change. Musculoskeletal: Positive for arthralgias, gait problem and joint swelling. Neurological ROS: negative for - behavioral changes, confusion, headaches or seizures. Negative for weakness and numbness. Dermatological ROS: negative for - mole changes, rash  Cardiovascular: Negative for leg swelling. Gastrointestinal: Negative for constipation, diarrhea, nausea and vomiting. Objective:  Dermatologic Exam:  Soft tissue lesion to the plantar right and left foot x2 with central core and petechiae. Pain on palpation of lesion. Skin No rashes or nodules noted. .   Skin is thin, with flaky sloughing skin as well as decreased hair growth to the lower leg  Small red hemosiderin deposits seen dorsal foot   Musculoskeletal:     1st MPJ ROM decreased, Bilateral.  Muscle strength 5/5, Bilateral.  Pain present upon palpation of toenails 1-5, Bilateral. decreased medial longitudinal arch, Bilateral.  Ankle ROM decreased,Bilateral.    Dorsally contracted digits present digits 2, Bilateral.     Vascular: DP pulses 1/4 bilateral.  PT pulses 0/4 bilateral.   CFT <5 seconds, Bilateral.  Hair growth absent to the level of the digits, Bilateral.  Edema present, Bilateral.  Varicosities absent, Bilateral. Erythema absent, Bilateral    Neurological: Sensation diminshed to light touch to level of digits, Bilateral.  Protective sensation intact 6/10 sites via 5.07/10g [place]-Spike Monofilament, Bilateral.  negative Tinel's, Bilateral.  negative Valleix sign, Bilateral.      Integument: Warm, dry, supple, Bilateral.  Open lesion absent, Bilateral.  Interdigital maceration absent to web spaces 4, Bilateral.  Nails 1-5 left and 1-5 right thickened > 3.0 mm, dystrophic and crumbly, discolored with subungual debris. Fissures absent, Bilateral.   General: AAO x 3 in NAD.     Derm  Toenail Description  Sites of Onychomycosis Involvement (Check affected

## 2020-08-05 ENCOUNTER — OFFICE VISIT (OUTPATIENT)
Dept: PODIATRY | Age: 30
End: 2020-08-05

## 2020-08-05 PROCEDURE — 99999 PR OFFICE/OUTPT VISIT,PROCEDURE ONLY: CPT | Performed by: PODIATRIST

## 2020-08-26 ENCOUNTER — OFFICE VISIT (OUTPATIENT)
Dept: PODIATRY | Age: 30
End: 2020-08-26
Payer: MEDICARE

## 2020-08-26 VITALS — HEIGHT: 66 IN | WEIGHT: 185 LBS | BODY MASS INDEX: 29.73 KG/M2 | TEMPERATURE: 98.6 F

## 2020-08-26 PROCEDURE — 17110 DESTRUCTION B9 LES UP TO 14: CPT | Performed by: PODIATRIST

## 2020-08-26 PROCEDURE — 4004F PT TOBACCO SCREEN RCVD TLK: CPT | Performed by: PODIATRIST

## 2020-08-26 PROCEDURE — G8428 CUR MEDS NOT DOCUMENT: HCPCS | Performed by: PODIATRIST

## 2020-08-26 PROCEDURE — G8419 CALC BMI OUT NRM PARAM NOF/U: HCPCS | Performed by: PODIATRIST

## 2020-08-26 PROCEDURE — 99213 OFFICE O/P EST LOW 20 MIN: CPT | Performed by: PODIATRIST

## 2020-08-26 PROCEDURE — 11721 DEBRIDE NAIL 6 OR MORE: CPT | Performed by: PODIATRIST

## 2020-08-26 NOTE — PROGRESS NOTES
diaphoresis, fatigue, fever and unexpected weight change. Musculoskeletal: Positive for arthralgias, gait problem and joint swelling. Neurological ROS: negative for - behavioral changes, confusion, headaches or seizures. Negative for weakness and numbness. Dermatological ROS: negative for - mole changes, rash  Cardiovascular: Negative for leg swelling. Gastrointestinal: Negative for constipation, diarrhea, nausea and vomiting. Objective:  Dermatologic Exam:  Soft tissue lesion to the plantar right and left foot x 4 with central core and petechiae. Pain on palpation of lesion. Skin No rashes or nodules noted. .   Skin is thin, with flaky sloughing skin as well as decreased hair growth to the lower leg  Small red hemosiderin deposits seen dorsal foot   Musculoskeletal:     1st MPJ ROM decreased, Bilateral.  Muscle strength 5/5, Bilateral.  Pain present upon palpation of toenails 1-5, Bilateral. decreased medial longitudinal arch, Bilateral.  Ankle ROM decreased,Bilateral.    Dorsally contracted digits present digits 2, Bilateral.     Vascular: DP pulses 1/4 bilateral.  PT pulses 0/4 bilateral.   CFT <5 seconds, Bilateral.  Hair growth absent to the level of the digits, Bilateral.  Edema present, Bilateral.  Varicosities absent, Bilateral. Erythema absent, Bilateral    Neurological: Sensation diminshed to light touch to level of digits, Bilateral.  Protective sensation intact 6/10 sites via 5.07/10g [place]-Spike Monofilament, Bilateral.  negative Tinel's, Bilateral.  negative Valleix sign, Bilateral.      Integument: Warm, dry, supple, Bilateral.  Open lesion absent, Bilateral.  Interdigital maceration absent to web spaces 4, Bilateral.  Nails 1-5 left and 1-5 right thickened > 3.0 mm, dystrophic and crumbly, discolored with subungual debris. Fissures absent, Bilateral.   General: AAO x 3 in NAD.     Derm  Toenail Description  Sites of Onychomycosis Involvement (Check affected area)  [x] [x] [x] [x] [x] [x] [x] [x] [x] [x]  5 4 3 2 1 1 2 3 4 5                          Right                                        Left    Thickness  [x] [x] [x] [x] [x] [x] [x] [x] [x] [x]  5 4 3 2 1 1 2 3 4 5                         Right                                        Left    Dystrophic Changes   [x] [x] [x] [x] [x] [x] [x] [x] [x] [x]  5 4 3 2 1 1 2 3 4 5                         Right                                        Left    Color  [x] [x] [x] [x] [x] [x] [x] [x] [x] [x]  5 4 3 2 1 1 2 3 4 5                          Right                                        Left    Incurvation/Ingrowin   [] [] [] [] [] [] [] [] [] []  5 4 3 2 1 1 2 3 4 5                         Right                                        Left    Inflammation/Pain   [x] [x] [x] [x] [x] [x] [x] [x] [x] [x]  5 4 3  2 1 1 2 3 4 5                         Right                                        Left       Nails are painful 1-10 with direct palpation. Q7   []Yes  []No                Q8   [x]Yes  []No                     Q9   []Yes    []No  Assessment:  26 y.o. male with:    Diagnosis Orders   1. Bilateral foot pain  25170 - [place] DESTRUCTION BENIGN LESIONS UP TO 14    03735 - [place] DEBRIDEMENT OF NAILS, 6 OR MORE   2. Difficulty walking  58263 - [place] DESTRUCTION BENIGN LESIONS UP TO 14    90998 - [place] DEBRIDEMENT OF NAILS, 6 OR MORE   3. Benign neoplasm of skin of right foot  14900 - [place] DESTRUCTION BENIGN LESIONS UP TO 14   4. Benign neoplasm of skin of lower limb, including hip, left  71091 - [place] DESTRUCTION BENIGN LESIONS UP TO 14   5. Dermatophytosis of nail  31142 - [place] DEBRIDEMENT OF NAILS, 6 OR MORE   6. Ingrown nail  16899 - [place] DEBRIDEMENT OF NAILS, 6 OR MORE         Plan:   Pt was evaluated and examined. Patient was given personalized discharge instructions. The lesion was partially excised and Pyrogallic acid was applied under occlusion. The patient will leave in place for 24-48 hours and than remove.  The patient

## 2020-10-26 ENCOUNTER — OFFICE VISIT (OUTPATIENT)
Dept: ORTHOPEDIC SURGERY | Age: 30
End: 2020-10-26
Payer: MEDICARE

## 2020-10-26 VITALS
DIASTOLIC BLOOD PRESSURE: 84 MMHG | WEIGHT: 185 LBS | HEIGHT: 66 IN | SYSTOLIC BLOOD PRESSURE: 126 MMHG | TEMPERATURE: 97.1 F | BODY MASS INDEX: 29.73 KG/M2 | HEART RATE: 72 BPM

## 2020-10-26 PROCEDURE — 4004F PT TOBACCO SCREEN RCVD TLK: CPT | Performed by: ORTHOPAEDIC SURGERY

## 2020-10-26 PROCEDURE — G8484 FLU IMMUNIZE NO ADMIN: HCPCS | Performed by: ORTHOPAEDIC SURGERY

## 2020-10-26 PROCEDURE — G8427 DOCREV CUR MEDS BY ELIG CLIN: HCPCS | Performed by: ORTHOPAEDIC SURGERY

## 2020-10-26 PROCEDURE — 99213 OFFICE O/P EST LOW 20 MIN: CPT | Performed by: ORTHOPAEDIC SURGERY

## 2020-10-26 PROCEDURE — G8419 CALC BMI OUT NRM PARAM NOF/U: HCPCS | Performed by: ORTHOPAEDIC SURGERY

## 2020-10-26 ASSESSMENT — ENCOUNTER SYMPTOMS
VOMITING: 0
COUGH: 0
DIARRHEA: 0
CHEST TIGHTNESS: 0
NAUSEA: 0
CONSTIPATION: 0
ABDOMINAL DISTENTION: 0
SHORTNESS OF BREATH: 0
APNEA: 0
ABDOMINAL PAIN: 0
COLOR CHANGE: 0

## 2020-10-26 NOTE — PROGRESS NOTES
constipation, diarrhea, nausea and vomiting. Genitourinary: Negative for difficulty urinating, dysuria and hematuria. Musculoskeletal: Positive for arthralgias. Negative for gait problem, joint swelling and myalgias. Skin: Negative for color change and rash. Neurological: Negative for dizziness, weakness, numbness and headaches. Psychiatric/Behavioral: Negative for sleep disturbance. Past Medical History:    Past Medical History:   Diagnosis Date    Diarrhea     Resolved (Written 05/14/2019)    Eczema     Lazy eye, right     Poor historian      Past Surgical History:    Past Surgical History:   Procedure Laterality Date    BUNIONECTOMY Right 12/1/2017    FOOT BUNIONECTOMY OLIVER / HAMMERTOE REPAIR 2ND TOE & MPJ RELEASE 2ND METATARSAL performed by Yuan Cintron DPM at [facility] Bilateral 5/16/2019    LEFT FOOT LAPIDUS BUNIONECTOMY AND 2ND METATARSAL 2ND DIGIT ARTHROPLASTY, RIGHT 2ND METATARSAL CONDYLECTOMY performed by Miles Carmichael DPM at [facility] Right     removed part of foreign body    FOOT SURGERY Bilateral 05/16/2019     LEFT FOOT LAPIDUS BUNIONECTOMY AND 2ND METATARSAL 2ND DIGIT ARTHROPLASTY, RIGHT 2ND METATARSAL CONDYLECTOMY (Bilateral )    FRACTURE SURGERY Right 5/31/2019    RIGHT  HAND CRPP, RIGHT 3RD, 4TH, 5TH METOCARPAL DISLOCATION performed by Clementina Hernandez DO at [address] [place] [address] HAND SURGERY Right 05/31/2019    RIGHT  HAND CRPP, RIGHT 3RD, 4TH, 5TH      Current Medications:   Current Outpatient Medications   Medication Sig Dispense Refill    Urea (CARMOL) 40 % cream Apply to feet once daily. 1 Tube 3    OXcarbazepine (TRILEPTAL) 600 MG tablet Take 600 mg by mouth 2 times daily      DULoxetine (CYMBALTA) 20 MG extended release capsule Take 1 capsule by mouth daily 60 capsule 0    ibuprofen (ADVIL;MOTRIN) 800 MG tablet Take 1 tablet by mouth every 8 hours as needed for Pain 30 tablet 0     No current facility-administered medications for this visit. Allergies:    Seasonal    Social History:   Social History     Socioeconomic History    Marital status:      Spouse name: None    Number of children: None    Years of education: None    Highest education level: None   Occupational History    None   Social Needs    Financial resource strain: None    Food insecurity     Worry: None     Inability: None    Transportation needs     Medical: None     Non-medical: None   Tobacco Use    Smoking status: Current Every Day Smoker     Packs/day: 0.25     Years: 4.00     Pack years: 1.00     Types: Cigarettes     Start date: 11/17/2012    Smokeless tobacco: Never Used    Tobacco comment: 3-4 cig. a day   Substance and Sexual Activity    Alcohol use: None     Comment: Rare    Drug use: Yes     Frequency: 7.0 times per week     Types: Marijuana    Sexual activity: Yes     Partners: Female   Lifestyle    Physical activity     Days per week: None     Minutes per session: None    Stress: None   Relationships    Social connections     Talks on phone: None     Gets together: None     Attends Jew service: None     Active member of club or organization: None     Attends meetings of clubs or organizations: None     Relationship status: None    Intimate partner violence     Fear of current or ex partner: None     Emotionally abused: None     Physically abused: None     Forced sexual activity: None   Other Topics Concern    None   Social History Narrative    None     Family History:  Family History   Adopted: Yes   Family history unknown: Yes     Vitals:   /84   Pulse 72   Temp 97.1 °F (36.2 °C)   Ht 5' 6\" (1.676 m)   Wt 185 lb (83.9 kg)   BMI 29.86 kg/m²  Body mass index is 29.86 kg/m². Physical Examination:     Orthopedics:    GENERAL: Alert and oriented X3 in no acute distress. SKIN: Intact without lesions or ulcerations. NEURO: Intact to sensory and motor testing. VASC: Capillary refill is less than 3 seconds.     KNEE EXAM    LOCATION: Left Knee  GEN: Alert and oriented X 3, in no acute distress. GAIT: The patient's gait was observed while entering the exam room and was noted to be antalgic. The extremity is in varus alignment. SKIN: Intact without rashes, lesions, or ulcerations. No obvious deformity or swelling. NEURO: The patient responds to light touch throughout left LE. Patellar and Achilles reflexes are 2/4. VASC: The left LE is neurovascularly intact with 2/4 DP and 2/4 PT pulses. Brisk capillary refill. ROM: 5/70 degrees. There is large effusion. MUSC: decreased quad tone  LIGAMENT: Lachman's test is 3+ with Poor endpoint. Anterior drawer Negative. Posterior drawer Negative. There is 2+ varus instability at 0 degrees and 3+ varus instability at 30 degrees. There is 1+ valgus instability at 0 degrees and No valgus instability at 30 degrees. PALP: There is medial and lateral joint line pain. Pain over the lateral femoral condyle. Assessment:     1. Rupture of anterior cruciate ligament of left knee, initial encounter      Procedures:    Procedure: no  Radiology:   X-rays were reviewed from [facility] on 10/17/2020. Plan:   Treatment : I reviewed the X-ray with the patient and I informed them that the knee has no evident fractures. We discussed the etiologies and natural histories of rupture of anterior the . We discussed the various treatment alternatives including anti-inflammatory medications, physical therapy, injections, further imaging studies and as a last result surgery. During today's visit, I explained to the patient that he most likely tore his ACL after I performed his physical examination. I then told him that he will need to have an MRI done on his knee so I can delineate how large the tear is within the knee. Then from there, I told him that he will need a surgery to fix and repair the tendon. In addition, I told him that he may get rid of the knee immobilizer because it is making his knee stiff. Then we will give him a tubigrip and knee exercises to help him rebuild his quadriceps leading up to the surgery so he may have a speedy recovery after surgery. I then asked him if he has crutches at home and the patient stated that he does, I then told him that he should use them when he is ambulating and he should also get rid of the knee immobilizer to help him regain some of his ROM. The patient then stated that he understands the plan and at this time, the patient has opted to get an MRI done for his left knee, a tubigrip and knee exercises. Patient should return to the clinic after his MRI and he is to follow up with Chandu Coyle D.O. The patient will call the office immediately with any problems. No orders of the defined types were placed in this encounter. Orders Placed This Encounter   Procedures    MRI KNEE LEFT WO CONTRAST     Standing Status:   Future     Standing Expiration Date:   10/26/2021     Order Specific Question:   Reason for exam:     Answer:   acl tear meniscus tear     Alex FELICIANO Day V, am scribing for and in the presence of Chandu HARDIN. 10/26/2020  1:09 PM      IChandu DO, have personally seen this patient and I have reviewed the CC, PMH, FHX and Social History as provided by other clinical staff. I reassessed the HPI and ROS as scribed by Shanice Bhandari Scribred in my presence and it is both accurate and complete. Thereafter, I personally performed the PE, reviewed the imaging and established the DX and POC. I agree with the documentation provided by the Medical Scribe. I have reviewed all documentation in its entirety prior to providing my signature indicating agreement. Any areas of disagreement are noted on the chart.     Electronically signed by Kaylin Casarez DO on 10/26/2020 at 1:09 PM        Electronically signed by Kaylin Casarez DO, on 10/26/2020 at 1:09 PM

## 2020-10-26 NOTE — PATIENT INSTRUCTIONS
Patient Education     Knee: Exercises  Introduction  Here are some examples of exercises for you to try. The exercises may be suggested for a condition or for rehabilitation. Start each exercise slowly. Ease off the exercises if you start to have pain. You will be told when to start these exercises and which ones will work best for you. How to do the exercises  Quad sets   1. Sit with your leg straight and supported on the floor or a firm bed. (If you feel discomfort in the front or back of your knee, place a small towel roll under your knee.)  2. Tighten the muscles on top of your thigh by pressing the back of your knee flat down to the floor. (If you feel discomfort under your kneecap, place a small towel roll under your knee.)  3. Hold for about 6 seconds, then rest for up to 10 seconds. 4. Do 8 to 12 repetitions several times a day. Straight-leg raises to the front   1. Lie on your back with your good knee bent so that your foot rests flat on the floor. Your injured leg should be straight. Make sure that your low back has a normal curve. You should be able to slip your flat hand in between the floor and the small of your back, with your palm touching the floor and your back touching the back of your hand. 2. Tighten the thigh muscles in the injured leg by pressing the back of your knee flat down to the floor. Hold your knee straight. 3. Keeping the thigh muscles tight, lift your injured leg up so that your heel is about 12 inches off the floor. Hold for about 6 seconds and then lower slowly. 4. Do 8 to 12 repetitions, 3 times a day. Straight-leg raises to the outside   1. Lie on your side, with your injured leg on top. 2. Tighten the front thigh muscles of your injured leg to keep your knee straight. 3. Keep your hip and your leg straight in line with the rest of your body, and keep your knee pointing forward. Do not drop your hip back.   4. Lift your injured leg straight up toward the ceiling, about 12 inches off the floor. Hold for about 6 seconds, then slowly lower your leg. 5. Do 8 to 12 repetitions. Straight-leg raises to the back   1. Lie on your stomach, and lift your leg straight up behind you (toward the ceiling). 2. Lift your toes about 6 inches off the floor, hold for about 6 seconds, then lower slowly. 3. Do 8 to 12 repetitions. Straight-leg raises to the inside   1. Lie on the side of your body with the injured leg. 2. You can either prop your other (good) leg up on a chair, or you can bend your good knee and put that foot in front of your injured knee. Do not drop your hip back. 3. Tighten the muscles on the front of your thigh to straighten your injured knee. 4. Keep your kneecap pointing forward, and lift your whole leg up toward the ceiling about 6 inches. Hold for about 6 seconds, then lower slowly. 5. Do 8 to 12 repetitions. Heel dig bridging   1. Lie on your back with both knees bent and your ankles bent so that only your heels are digging into the floor. Your knees should be bent about 90 degrees. 2. Then push your heels into the floor, squeeze your buttocks, and lift your hips off the floor until your shoulders, hips, and knees are all in a straight line. 3. Hold for about 6 seconds as you continue to breathe normally, and then slowly lower your hips back down to the floor and rest for up to 10 seconds. 4. Do 8 to 12 repetitions. Hamstring curls   1. Lie on your stomach with your knees straight. If your kneecap is uncomfortable, roll up a washcloth and put it under your leg just above your kneecap. 2. Lift the foot of your injured leg by bending the knee so that you bring the foot up toward your buttock. If this motion hurts, try it without bending your knee quite as far. This may help you avoid any painful motion. 3. Slowly lower your leg back to the floor. 4. Do 8 to 12 repetitions.   5. With permission from your doctor or physical therapist, you may also want to add a cuff weight to your ankle (not more than 5 pounds). With weight, you do not have to lift your leg more than 12 inches to get a hamstring workout. Shallow standing knee bends   Do this exercise only if you have very little pain; if you have no clicking, locking, or giving way if you have an injured knee; and if it does not hurt while you are doing 8 to 12 repetitions. 1. Stand with your hands lightly resting on a counter or chair in front of you. Put your feet shoulder-width apart. 2. Slowly bend your knees so that you squat down like you are going to sit in a chair. Make sure your knees do not go in front of your toes. 3. Lower yourself about 6 inches. Your heels should remain on the floor at all times. 4. Rise slowly to a standing position. Heel raises   1. Stand with your feet a few inches apart, with your hands lightly resting on a counter or chair in front of you. 2. Slowly raise your heels off the floor while keeping your knees straight. 3. Hold for about 6 seconds, then slowly lower your heels to the floor. 4. Do 8 to 12 repetitions several times during the day. Follow-up care is a key part of your treatment and safety. Be sure to make and go to all appointments, and call your doctor if you are having problems. It's also a good idea to know your test results and keep a list of the medicines you take. Where can you learn more? Go to https://[email or web address].[email or web address]. org and sign in to your [employer] account. Enter W068 in the [facility] box to learn more about \"Knee: Exercises. \"     If you do not have an account, please click on the \"Sign Up Now\" link. Current as of: March 2, 2020               Content Version: 12.6  © 1518-3964 [employer]. Care instructions adapted under license by [facility] ([facility]).  If you have questions about a medical condition or this instruction, always ask your healthcare professional. [address] any warranty or liability for your use of this information.

## 2020-10-29 ENCOUNTER — HOSPITAL ENCOUNTER (EMERGENCY)
Age: 30
Discharge: HOME | End: 2020-10-29
Attending: EMERGENCY MEDICINE
Payer: MEDICARE

## 2020-10-29 VITALS
TEMPERATURE: 98.7 F | WEIGHT: 190 LBS | BODY MASS INDEX: 28.79 KG/M2 | DIASTOLIC BLOOD PRESSURE: 82 MMHG | RESPIRATION RATE: 14 BRPM | HEART RATE: 80 BPM | OXYGEN SATURATION: 100 % | HEIGHT: 68 IN | SYSTOLIC BLOOD PRESSURE: 143 MMHG

## 2020-10-29 PROCEDURE — 99282 EMERGENCY DEPT VISIT SF MDM: CPT

## 2020-10-29 RX ORDER — ERGOCALCIFEROL 1.25 MG/1
CAPSULE ORAL
COMMUNITY
Start: 2020-09-09 | End: 2021-11-17

## 2020-10-29 RX ORDER — HYDROCODONE BITARTRATE AND ACETAMINOPHEN 5; 325 MG/1; MG/1
1 TABLET ORAL EVERY 6 HOURS PRN
Qty: 20 TABLET | Refills: 0 | Status: SHIPPED | OUTPATIENT
Start: 2020-10-29 | End: 2020-11-01

## 2020-10-29 ASSESSMENT — PAIN SCALES - GENERAL: PAINLEVEL_OUTOF10: 10

## 2020-10-29 ASSESSMENT — ENCOUNTER SYMPTOMS
ABDOMINAL PAIN: 0
VOMITING: 0
NAUSEA: 0
SHORTNESS OF BREATH: 0
COLOR CHANGE: 0
SINUS PRESSURE: 0
COUGH: 0

## 2020-10-29 ASSESSMENT — PAIN DESCRIPTION - ORIENTATION: ORIENTATION: LEFT

## 2020-10-29 ASSESSMENT — PAIN DESCRIPTION - PAIN TYPE: TYPE: ACUTE PAIN

## 2020-10-29 ASSESSMENT — PAIN DESCRIPTION - LOCATION: LOCATION: KNEE

## 2020-10-29 NOTE — ED PROVIDER NOTES
[facility] ED  EMERGENCY DEPARTMENT ENCOUNTER      Pt Name: Ramses Browning  MRN: [id number]  [place] 1990  Date of evaluation: 10/29/20  CHIEF COMPLAINT       Chief Complaint   Patient presents with    Knee Pain     left     HISTORY OF PRESENT ILLNESS   Presents to the emergency room via private auto with left knee pain that has been present for the past 10 days. Maryagnes Began occurred when he was playing basketball. He states that he dunk the ball and came down wrong. He did not feel or hear a snap or pop. He waited approximately 1 week before being seen as he thought the pain would improve. He had x-rays taken at the [facility] which did not have any acute findings. He was told there was still concern for a tear and he then followed up with the orthopedist to his concern for ACL tear. He has a prescription to have an outpatient MRI which he needs to schedule. He was initially given a knee immobilizer that was causing his knee to be too stiff. He did get the okay from the orthopedist that he he can continue the immobilizer. He was given a wrap which she has been wearing as directed. He is here today because over-the-counter pain medications have been ineffective. REVIEW OF SYSTEMS     Review of Systems   Constitutional: Negative for activity change and fever. HENT: Negative for congestion and sinus pressure. Respiratory: Negative for cough and shortness of breath. Cardiovascular: Negative for chest pain and palpitations. Gastrointestinal: Negative for abdominal pain, nausea and vomiting. Genitourinary: Negative for dysuria, frequency and hematuria. Musculoskeletal: Positive for arthralgias and gait problem. Negative for joint swelling and myalgias. Skin: Negative for color change and wound. Neurological: Negative for dizziness and headaches. Psychiatric/Behavioral: Negative for confusion and decreased concentration.      PASTMEDICAL HISTORY     Past Medical History: Diagnosis Date    Anxiety     Bipolar 1 disorder ([facility] [address]     Diarrhea     Resolved (Written 05/14/2019)    Eczema     Hypertension     Lazy eye, right     Poor historian      SURGICAL HISTORY       Past Surgical History:   Procedure Laterality Date    BUNIONECTOMY Right 12/1/2017    FOOT BUNIONECTOMY OLIVER / HAMMERTOE REPAIR 2ND TOE & MPJ RELEASE 2ND METATARSAL performed by Alisia De Los Santos DPM at [address] [place] [address] Bilateral 5/16/2019    LEFT FOOT LAPIDUS BUNIONECTOMY AND 2ND METATARSAL 2ND DIGIT ARTHROPLASTY, RIGHT 2ND METATARSAL CONDYLECTOMY performed by Sera De Leon DPM at [facility] Right     removed part of foreign body    FOOT SURGERY Bilateral 05/16/2019     LEFT FOOT LAPIDUS BUNIONECTOMY AND 2ND METATARSAL 2ND DIGIT ARTHROPLASTY, RIGHT 2ND METATARSAL CONDYLECTOMY (Bilateral )    FRACTURE SURGERY Right 5/31/2019    RIGHT  HAND CRPP, RIGHT 3RD, 4TH, 5TH METOCARPAL DISLOCATION performed by Alistair Patel DO at [facility] [address] Right 05/31/2019    RIGHT  HAND CRPP, RIGHT 3RD, 4TH, 5TH      CURRENT MEDICATIONS       Discharge Medication List as of 10/29/2020 12:17 PM      CONTINUE these medications which have NOT CHANGED    Details   vitamin D (ERGOCALCIFEROL) 1.25 MG (19429 UT) CAPS capsule Historical Med      ibuprofen (ADVIL;MOTRIN) 800 MG tablet Take 1 tablet by mouth every 8 hours as needed for Pain, Disp-30 tablet, R-0Print      Urea (CARMOL) 40 % cream Apply to feet once daily. , Disp-1 Tube, R-3, Normal      OXcarbazepine (TRILEPTAL) 600 MG tablet Take 600 mg by mouth 2 times dailyHistorical Med      DULoxetine (CYMBALTA) 20 MG extended release capsule Take 1 capsule by mouth daily, Disp-60 capsule, R-0Normal           ALLERGIES     is allergic to seasonal.  FAMILY HISTORY     is adopted.       SOCIAL HISTORY       Social History     Tobacco Use    Smoking status: Current Every Day Smoker     Packs/day: 0.25     Years: 4.00     Pack years: 1.00     Types: Cigarettes Start date: 11/17/2012    Smokeless tobacco: Never Used    Tobacco comment: 3-4 cig. a day   Substance Use Topics    Alcohol use: Not on file     Comment: Rare    Drug use: Not Currently     Frequency: 7.0 times per week     Types: Marijuana     PHYSICAL EXAM     INITIAL VITALS: BP (!) 143/82   Pulse 80   Temp 98.7 °F (37.1 °C) (Oral)   Resp 14   Ht 5' 8\" (1.727 m)   Wt 190 lb (86.2 kg)   SpO2 100%   BMI 28.89 kg/m²    Physical Exam  Constitutional:       Appearance: Normal appearance. HENT:      Right Ear: External ear normal.      Left Ear: External ear normal.   Eyes:      General:         Right eye: No discharge. Left eye: No discharge. Cardiovascular:      Rate and Rhythm: Normal rate and regular rhythm. Pulses: Normal pulses. Pulmonary:      Effort: Pulmonary effort is normal.      Breath sounds: Normal breath sounds. Musculoskeletal: Normal range of motion. General: Tenderness present. No swelling or deformity. Skin:     General: Skin is warm and dry. Capillary Refill: Capillary refill takes less than 2 seconds. Findings: No bruising or erythema. Neurological:      General: No focal deficit present. Mental Status: He is alert and oriented to person, place, and time. Psychiatric:         Mood and Affect: Mood normal.         Thought Content: Thought content normal.         DIAGNOSTIC RESULTS     RADIOLOGY:All plain film, CT, MRI, and formal ultrasound images (except ED bedside ultrasound) are read by the radiologist, see reports below, unless otherwise noted in MDM or here. Interpretation per the Radiologist below, if available at the time of this note:    No orders to display       LABS:  Labs Reviewed - No data to display    All other labs were within normal range or not returned as of this dictation.     EMERGENCY DEPARTMENT COURSE and DIFFERENTIAL DIAGNOSIS/MDM:   Vitals:    Vitals:    10/29/20 1056   BP: (!) 143/82   Pulse: 80   Resp: 14 Temp: 98.7 °F (37.1 °C)   TempSrc: Oral   SpO2: 100%   Weight: 190 lb (86.2 kg)   Height: 5' 8\" (1.727 m)       Medical Decision Makin-year-old male with knee pain after injury. I was able to look at the note from his the pedis 2 is concern for ACL tear. He did have an MRI ordered and he gave consent to discontinue the knee immobilizer. He was discharged home with pain medication and instructions for follow-up. The patient was given the following meds in the ED:  Orders Placed This Encounter   Medications    HYDROcodone-acetaminophen (NORCO) 5-325 MG per tablet     Sig: Take 1 tablet by mouth every 6 hours as needed for Pain for up to 3 days. Dispense:  20 tablet     Refill:  0       FINAL IMPRESSION      1. Injury of left knee, initial encounter          DISPOSITION/PLAN   DISPOSITION Decision To Discharge 10/29/2020 12:16:11 PM      PATIENT REFERRED TO:   Ting Gross [address] [place] [address] [address] [facility] [address] [facility]  [phone]      as scheduled      DISCHARGE MEDICATIONS:     Discharge Medication List as of 10/29/2020 12:17 PM      START taking these medications    Details   HYDROcodone-acetaminophen (NORCO) 5-325 MG per tablet Take 1 tablet by mouth every 6 hours as needed for Pain for up to 3 days. , Disp-20 tablet,R-0Print             CRITICAL CARE TIME       Please note that portions of this note were completed with a voice recognition program.      FRANKY HERMOSILLO, RODNEY - CNP            RODNEY Plaza - CNP  10/29/20 0808

## 2020-11-04 ENCOUNTER — HOSPITAL ENCOUNTER (OUTPATIENT)
Dept: MRI IMAGING | Age: 30
Discharge: HOME | End: 2020-11-06
Payer: MEDICARE

## 2020-11-04 PROCEDURE — 73721 MRI JNT OF LWR EXTRE W/O DYE: CPT

## 2020-11-09 ENCOUNTER — OFFICE VISIT (OUTPATIENT)
Dept: ORTHOPEDIC SURGERY | Age: 30
End: 2020-11-09
Payer: MEDICARE

## 2020-11-09 VITALS
BODY MASS INDEX: 28.49 KG/M2 | HEART RATE: 58 BPM | TEMPERATURE: 97 F | WEIGHT: 188 LBS | HEIGHT: 68 IN | DIASTOLIC BLOOD PRESSURE: 83 MMHG | SYSTOLIC BLOOD PRESSURE: 129 MMHG

## 2020-11-09 PROCEDURE — 4004F PT TOBACCO SCREEN RCVD TLK: CPT | Performed by: ORTHOPAEDIC SURGERY

## 2020-11-09 PROCEDURE — G8484 FLU IMMUNIZE NO ADMIN: HCPCS | Performed by: ORTHOPAEDIC SURGERY

## 2020-11-09 PROCEDURE — G8427 DOCREV CUR MEDS BY ELIG CLIN: HCPCS | Performed by: ORTHOPAEDIC SURGERY

## 2020-11-09 PROCEDURE — G8419 CALC BMI OUT NRM PARAM NOF/U: HCPCS | Performed by: ORTHOPAEDIC SURGERY

## 2020-11-09 PROCEDURE — 99213 OFFICE O/P EST LOW 20 MIN: CPT | Performed by: ORTHOPAEDIC SURGERY

## 2020-11-09 ASSESSMENT — ENCOUNTER SYMPTOMS
NAUSEA: 0
COUGH: 0
SHORTNESS OF BREATH: 0
ABDOMINAL PAIN: 0
APNEA: 0
CONSTIPATION: 0
ABDOMINAL DISTENTION: 0
CHEST TIGHTNESS: 0
COLOR CHANGE: 0
DIARRHEA: 0
VOMITING: 0

## 2020-11-09 NOTE — PATIENT INSTRUCTIONS
Patient Education        Knee: Exercises  Introduction  Here are some examples of exercises for you to try. The exercises may be suggested for a condition or for rehabilitation. Start each exercise slowly. Ease off the exercises if you start to have pain. You will be told when to start these exercises and which ones will work best for you. How to do the exercises  Quad sets   1. Sit with your leg straight and supported on the floor or a firm bed. (If you feel discomfort in the front or back of your knee, place a small towel roll under your knee.)  2. Tighten the muscles on top of your thigh by pressing the back of your knee flat down to the floor. (If you feel discomfort under your kneecap, place a small towel roll under your knee.)  3. Hold for about 6 seconds, then rest for up to 10 seconds. 4. Do 8 to 12 repetitions several times a day. Straight-leg raises to the front   1. Lie on your back with your good knee bent so that your foot rests flat on the floor. Your injured leg should be straight. Make sure that your low back has a normal curve. You should be able to slip your flat hand in between the floor and the small of your back, with your palm touching the floor and your back touching the back of your hand. 2. Tighten the thigh muscles in the injured leg by pressing the back of your knee flat down to the floor. Hold your knee straight. 3. Keeping the thigh muscles tight, lift your injured leg up so that your heel is about 12 inches off the floor. Hold for about 6 seconds and then lower slowly. 4. Do 8 to 12 repetitions, 3 times a day. Straight-leg raises to the outside   1. Lie on your side, with your injured leg on top. 2. Tighten the front thigh muscles of your injured leg to keep your knee straight. 3. Keep your hip and your leg straight in line with the rest of your body, and keep your knee pointing forward. Do not drop your hip back.   4. Lift your injured leg straight up toward the ceiling, about 12 inches off the floor. Hold for about 6 seconds, then slowly lower your leg. 5. Do 8 to 12 repetitions. Straight-leg raises to the back   1. Lie on your stomach, and lift your leg straight up behind you (toward the ceiling). 2. Lift your toes about 6 inches off the floor, hold for about 6 seconds, then lower slowly. 3. Do 8 to 12 repetitions. Straight-leg raises to the inside   1. Lie on the side of your body with the injured leg. 2. You can either prop your other (good) leg up on a chair, or you can bend your good knee and put that foot in front of your injured knee. Do not drop your hip back. 3. Tighten the muscles on the front of your thigh to straighten your injured knee. 4. Keep your kneecap pointing forward, and lift your whole leg up toward the ceiling about 6 inches. Hold for about 6 seconds, then lower slowly. 5. Do 8 to 12 repetitions. Heel dig bridging   1. Lie on your back with both knees bent and your ankles bent so that only your heels are digging into the floor. Your knees should be bent about 90 degrees. 2. Then push your heels into the floor, squeeze your buttocks, and lift your hips off the floor until your shoulders, hips, and knees are all in a straight line. 3. Hold for about 6 seconds as you continue to breathe normally, and then slowly lower your hips back down to the floor and rest for up to 10 seconds. 4. Do 8 to 12 repetitions. Hamstring curls   1. Lie on your stomach with your knees straight. If your kneecap is uncomfortable, roll up a washcloth and put it under your leg just above your kneecap. 2. Lift the foot of your injured leg by bending the knee so that you bring the foot up toward your buttock. If this motion hurts, try it without bending your knee quite as far. This may help you avoid any painful motion. 3. Slowly lower your leg back to the floor. 4. Do 8 to 12 repetitions.   5. With permission from your doctor or physical therapist, you may also want to add a cuff weight to your ankle (not more than 5 pounds). With weight, you do not have to lift your leg more than 12 inches to get a hamstring workout. Shallow standing knee bends   Do this exercise only if you have very little pain; if you have no clicking, locking, or giving way if you have an injured knee; and if it does not hurt while you are doing 8 to 12 repetitions. 1. Stand with your hands lightly resting on a counter or chair in front of you. Put your feet shoulder-width apart. 2. Slowly bend your knees so that you squat down like you are going to sit in a chair. Make sure your knees do not go in front of your toes. 3. Lower yourself about 6 inches. Your heels should remain on the floor at all times. 4. Rise slowly to a standing position. Heel raises   1. Stand with your feet a few inches apart, with your hands lightly resting on a counter or chair in front of you. 2. Slowly raise your heels off the floor while keeping your knees straight. 3. Hold for about 6 seconds, then slowly lower your heels to the floor. 4. Do 8 to 12 repetitions several times during the day. Follow-up care is a key part of your treatment and safety. Be sure to make and go to all appointments, and call your doctor if you are having problems. It's also a good idea to know your test results and keep a list of the medicines you take. Where can you learn more? Go to https://[email or web address]. org and sign in to your [employer] account. Enter K421 in the [facility] box to learn more about \"Knee: Exercises. \"     If you do not have an account, please click on the \"Sign Up Now\" link. Current as of: March 2, 2020               Content Version: 12.6  © 9123-9750 [employer]. Care instructions adapted under license by [facility] ([facility]).  If you have questions about a medical condition or this instruction, always ask your healthcare professional. Gab Brambila any warranty or liability for your use of this information.

## 2020-11-09 NOTE — PROGRESS NOTES
[facility]  [address] [place] [id number]  Dept: [phone]  Dept Fax: [phone]                                                                           Left Knee - Follow Up/ MRI review    Subjective:     Chief Complaint   Patient presents with    Knee Pain     Left knee MRI review, DOI- 10/20/20     HPI:     Jaz Soto presents today for Left knee pain. The pain has been present since 10/20/2020. The patient recalls a specific injury where the knee was injured after he dunked a basketball when he was playing basketball on 10/10/2020. Patient states that he knew he came down on it wrong but he thought nothing of it at first. He then gave it a week to get better but it did not get better, so he went to [facility] on 10/17/2020 where they got x-rays and they gave him a knee immobilizer, a prescription for Norco, Ibuprofen 400 mg and tylenol. They also informed him that he may have torn his ACL and they referred him to us. The patient has tried ibuprofen, norco, tylenol, a cane and rest with no improvement. The pain is now described as Rolan Conception, and Dull. There is pain on weight bearing. The knee has swelled. There is no painful popping and clicking. The knee has not caught or locked up. The knee has given out. It is not stiff upon arising from sitting. It is painful to go up and down stairs and sit for a prolonged time. The patient has not had a cortisone injection. The patient has not tried a lubrication injection. The patient has not tried physical therapy. The patient has not had surgery. The patient is here for a MRI review of his left knee. There are no significant changes with the patient's left knee. He states that his knee still feels unstable and that it could give out at any given point. ROS:   Review of Systems   Constitutional: Positive for activity change.  Negative for appetite change, fatigue and fever. Respiratory: Negative for apnea, cough, chest tightness and shortness of breath. Cardiovascular: Negative for chest pain, palpitations and leg swelling. Gastrointestinal: Negative for abdominal distention, abdominal pain, constipation, diarrhea, nausea and vomiting. Genitourinary: Negative for difficulty urinating, dysuria and hematuria. Musculoskeletal: Positive for arthralgias and joint swelling. Negative for gait problem and myalgias. Skin: Negative for color change and rash. Neurological: Negative for dizziness, weakness, numbness and headaches. Psychiatric/Behavioral: Negative for sleep disturbance.        Past Medical History:    Past Medical History:   Diagnosis Date    Anxiety     Bipolar 1 disorder ([facility] [address]     Diarrhea     Resolved (Written 05/14/2019)    Eczema     Hypertension     Lazy eye, right     Poor historian        Past Surgical History:    Past Surgical History:   Procedure Laterality Date    BUNIONECTOMY Right 12/1/2017    FOOT BUNIONECTOMY OLIVER / HAMMERTOE REPAIR 2ND TOE & MPJ RELEASE 2ND METATARSAL performed by Shannon Dickson DPM at [facility] Bilateral 5/16/2019    LEFT FOOT LAPIDUS BUNIONECTOMY AND 2ND METATARSAL 2ND DIGIT ARTHROPLASTY, RIGHT 2ND METATARSAL CONDYLECTOMY performed by Roxann Latham DPM at [facility] Right     removed part of foreign body    FOOT SURGERY Bilateral 05/16/2019     LEFT FOOT LAPIDUS BUNIONECTOMY AND 2ND METATARSAL 2ND DIGIT ARTHROPLASTY, RIGHT 2ND METATARSAL CONDYLECTOMY (Bilateral )    FRACTURE SURGERY Right 5/31/2019    RIGHT  HAND CRPP, RIGHT 3RD, 4TH, 5TH METOCARPAL DISLOCATION performed by Gia Reddy DO at [facility] HAND SURGERY Right 05/31/2019    RIGHT  HAND CRPP, RIGHT 3RD, 4TH, 5TH        CurrentMedications:   Current Outpatient Medications   Medication Sig Dispense Refill    vitamin D (ERGOCALCIFEROL) 1.25 MG (19456 UT) CAPS capsule       Urea (CARMOL) 40 % cream Apply to feet once ORDERING SYSTEM PROVIDED HISTORY: Rupture of anterior cruciate ligament of    left knee, initial encounter    TECHNOLOGIST PROVIDED HISTORY:    acl tear meniscus tear    Reason for Exam: pain in knee for three weeks after playing basketball    Acuity: Chronic    Type of Exam: Subsequent/Follow-up    Additional signs and symptoms: lateral and medial area pain    Relevant Medical/Surgical History: swelling to left knee.         FINDINGS:    MENISCI: Medial meniscus is intact.  Complex tear of the posterior horn of    the lateral meniscus with a small fragment flipped and displaced along the    lateral aspect of the joint.         CRUCIATE LIGAMENTS: Near complete tear of the anterior cruciate ligament. Posterior cruciate ligament is intact.         EXTENSOR MECHANISM: Distal quadriceps tendon is intact.  Patellar tendon is    intact.         LATERAL COLLATERAL LIGAMENT COMPLEX: Mild sprain of the lateral collateral    ligament.         MEDIAL COLLATERAL LIGAMENT COMPLEX: Medial collateral ligament is intact.         KNEE JOINT: Large joint effusion.  Soft tissue injury along the lateral    aspect of the knee.         BONE MARROW: Impaction fracture within the lateral femoral condyle.  Bone    marrow contusions within the medial and lateral tibial plateaus.  No    suspicious bone marrow replacing lesion.              Impression    Near complete tear of the anterior cruciate ligament with an impaction    fracture of the lateral femoral condyle.         Mild sprain of the lateral collateral ligament.         Complex tear of the lateral meniscus with a fragment of the posterior horn    suspected to be flipped and displaced. Plan:   Treatment : I reviewed the MRI with the patient. We discussed the etiologies and natural histories of an anterior cruciate ligament tear of the left knee.  We discussed the various treatment alternatives including anti-inflammatory medications, physical therapy, injections, further imaging studies and as a last result surgery. During today's visit, I explained to the patient that his MRI does show that he has an ACL tear and tear of his lateral meniscus of his left knee. I then informed the patient that he would benefit from a surgery to reconstruct his ACL and either repair or trim out the lateral meniscus tear. The patient has elected in proceeding with a left knee arthroscopy with an anterior cruciate ligament reconstruction and lateral meniscus repair vs meniscectomy. The risks/benefits/alternatives and potential complications have been discussed with the patient. We also had a long discussion regarding the procedure itself and expectation of the recovery. All questions and concerns with addressed. Patient was instructed to call our office with any question or concerns prior to the procedure occurs. A physical therapy prescription was not given. Patient should return to the clinic 1 week post op for his first post operative appointment with [facility] of the left knee and to follow up with  Svetlana Owens PA-C. The patient will call the office immediately with any problems. No orders of the defined types were placed in this encounter. No orders of the defined types were placed in this encounter. Penny Auguste MS, ATJERRY, am scribing for and in the presence of Marleni HARDIN. 11/9/2020  9:43 AM        Electronically signed by Kingsley Polanco ATC, on 11/9/2020 at 9:43 AM             I, Marleni Escamilla DO, have personally seen this patient and I have reviewed the CC, PMH, FHX and Social History as provided by other clinical staff. I reassessed the HPI and ROS as scribed by Kingsley Polanco ATC in my presence and it is both accurate and complete. Thereafter, I personally performed the PE, reviewed the imaging and established the DX and POC. I agree with the documentation provided by the .  I have reviewed all documentation in its entirety prior to providing my

## 2020-11-16 ENCOUNTER — HOSPITAL ENCOUNTER (OUTPATIENT)
Dept: PREADMISSION TESTING | Age: 30
Setting detail: SPECIMEN
Discharge: HOME | End: 2020-11-20
Payer: MEDICARE

## 2020-11-16 PROCEDURE — U0003 INFECTIOUS AGENT DETECTION BY NUCLEIC ACID (DNA OR RNA); SEVERE ACUTE RESPIRATORY SYNDROME CORONAVIRUS 2 (SARS-COV-2) (CORONAVIRUS DISEASE [COVID-19]), AMPLIFIED PROBE TECHNIQUE, MAKING USE OF HIGH THROUGHPUT TECHNOLOGIES AS DESCRIBED BY CMS-2020-01-R: HCPCS

## 2020-11-18 LAB — SARS-COV-2, NAA: NOT DETECTED

## 2020-11-19 ENCOUNTER — ANESTHESIA EVENT (OUTPATIENT)
Dept: OPERATING ROOM | Age: 30
End: 2020-11-19
Payer: MEDICARE

## 2020-11-20 ENCOUNTER — HOSPITAL ENCOUNTER (OUTPATIENT)
Age: 30
Setting detail: OUTPATIENT SURGERY
Discharge: HOME | End: 2020-11-20
Attending: ORTHOPAEDIC SURGERY | Admitting: ORTHOPAEDIC SURGERY
Payer: MEDICARE

## 2020-11-20 ENCOUNTER — ANESTHESIA (OUTPATIENT)
Dept: OPERATING ROOM | Age: 30
End: 2020-11-20
Payer: MEDICARE

## 2020-11-20 VITALS
RESPIRATION RATE: 10 BRPM | TEMPERATURE: 95.2 F | SYSTOLIC BLOOD PRESSURE: 172 MMHG | OXYGEN SATURATION: 100 % | DIASTOLIC BLOOD PRESSURE: 110 MMHG

## 2020-11-20 VITALS
BODY MASS INDEX: 28.49 KG/M2 | HEIGHT: 68 IN | DIASTOLIC BLOOD PRESSURE: 83 MMHG | WEIGHT: 188 LBS | TEMPERATURE: 97.5 F | SYSTOLIC BLOOD PRESSURE: 158 MMHG | OXYGEN SATURATION: 97 % | RESPIRATION RATE: 15 BRPM | HEART RATE: 70 BPM

## 2020-11-20 PROCEDURE — 7100000000 HC PACU RECOVERY - FIRST 15 MIN: Performed by: ORTHOPAEDIC SURGERY

## 2020-11-20 PROCEDURE — 6360000002 HC RX W HCPCS: Performed by: ORTHOPAEDIC SURGERY

## 2020-11-20 PROCEDURE — C1776 JOINT DEVICE (IMPLANTABLE): HCPCS | Performed by: ORTHOPAEDIC SURGERY

## 2020-11-20 PROCEDURE — 7100000011 HC PHASE II RECOVERY - ADDTL 15 MIN: Performed by: ORTHOPAEDIC SURGERY

## 2020-11-20 PROCEDURE — L1851 KO SINGLE UPRIGHT PREFAB OTS: HCPCS | Performed by: ORTHOPAEDIC SURGERY

## 2020-11-20 PROCEDURE — 7100000001 HC PACU RECOVERY - ADDTL 15 MIN: Performed by: ORTHOPAEDIC SURGERY

## 2020-11-20 PROCEDURE — 2500000003 HC RX 250 WO HCPCS: Performed by: NURSE ANESTHETIST, CERTIFIED REGISTERED

## 2020-11-20 PROCEDURE — 2580000003 HC RX 258: Performed by: ANESTHESIOLOGY

## 2020-11-20 PROCEDURE — 6360000002 HC RX W HCPCS: Performed by: NURSE ANESTHETIST, CERTIFIED REGISTERED

## 2020-11-20 PROCEDURE — 3700000001 HC ADD 15 MINUTES (ANESTHESIA): Performed by: ORTHOPAEDIC SURGERY

## 2020-11-20 PROCEDURE — 3600000013 HC SURGERY LEVEL 3 ADDTL 15MIN: Performed by: ORTHOPAEDIC SURGERY

## 2020-11-20 PROCEDURE — 3600000003 HC SURGERY LEVEL 3 BASE: Performed by: ORTHOPAEDIC SURGERY

## 2020-11-20 PROCEDURE — 7100000010 HC PHASE II RECOVERY - FIRST 15 MIN: Performed by: ORTHOPAEDIC SURGERY

## 2020-11-20 PROCEDURE — 2720000010 HC SURG SUPPLY STERILE: Performed by: ORTHOPAEDIC SURGERY

## 2020-11-20 PROCEDURE — 3700000000 HC ANESTHESIA ATTENDED CARE: Performed by: ORTHOPAEDIC SURGERY

## 2020-11-20 PROCEDURE — 2580000003 HC RX 258: Performed by: NURSE ANESTHETIST, CERTIFIED REGISTERED

## 2020-11-20 PROCEDURE — C1713 ANCHOR/SCREW BN/BN,TIS/BN: HCPCS | Performed by: ORTHOPAEDIC SURGERY

## 2020-11-20 PROCEDURE — 2709999900 HC NON-CHARGEABLE SUPPLY: Performed by: ORTHOPAEDIC SURGERY

## 2020-11-20 DEVICE — BUTTON FIX W8XL12MM TI ATTCH SYS ALLGRFT CONSTRUCT FOR: Type: IMPLANTABLE DEVICE | Site: KNEE | Status: FUNCTIONAL

## 2020-11-20 DEVICE — IMPLANTABLE DEVICE: Type: IMPLANTABLE DEVICE | Site: KNEE | Status: FUNCTIONAL

## 2020-11-20 DEVICE — SCREW INTFR L20MM DIA10MM VENT BIOCOMPOSITE FASTTHREAD: Type: IMPLANTABLE DEVICE | Site: KNEE | Status: FUNCTIONAL

## 2020-11-20 DEVICE — SYSTEM IMPL MENIS ROOT REP W/ PEEK SWIVELOCK: Type: IMPLANTABLE DEVICE | Site: KNEE | Status: FUNCTIONAL

## 2020-11-20 RX ORDER — MIDAZOLAM HYDROCHLORIDE 1 MG/ML
INJECTION INTRAMUSCULAR; INTRAVENOUS PRN
Status: DISCONTINUED | OUTPATIENT
Start: 2020-11-20 | End: 2020-11-20 | Stop reason: SDUPTHER

## 2020-11-20 RX ORDER — PHENYLEPHRINE HCL IN 0.9% NACL 1 MG/10 ML
SYRINGE (ML) INTRAVENOUS PRN
Status: DISCONTINUED | OUTPATIENT
Start: 2020-11-20 | End: 2020-11-20 | Stop reason: SDUPTHER

## 2020-11-20 RX ORDER — FENTANYL CITRATE 50 UG/ML
25 INJECTION, SOLUTION INTRAMUSCULAR; INTRAVENOUS EVERY 5 MIN PRN
Status: DISCONTINUED | OUTPATIENT
Start: 2020-11-20 | End: 2020-11-20 | Stop reason: HOSPADM

## 2020-11-20 RX ORDER — MIDAZOLAM HYDROCHLORIDE 1 MG/ML
2 INJECTION INTRAMUSCULAR; INTRAVENOUS ONCE
Status: DISCONTINUED | OUTPATIENT
Start: 2020-11-20 | End: 2020-11-20

## 2020-11-20 RX ORDER — PROPOFOL 10 MG/ML
INJECTION, EMULSION INTRAVENOUS PRN
Status: DISCONTINUED | OUTPATIENT
Start: 2020-11-20 | End: 2020-11-20 | Stop reason: SDUPTHER

## 2020-11-20 RX ORDER — SODIUM CHLORIDE 0.9 % (FLUSH) 0.9 %
10 SYRINGE (ML) INJECTION PRN
Status: DISCONTINUED | OUTPATIENT
Start: 2020-11-20 | End: 2020-11-20 | Stop reason: HOSPADM

## 2020-11-20 RX ORDER — SODIUM CHLORIDE, SODIUM LACTATE, POTASSIUM CHLORIDE, CALCIUM CHLORIDE 600; 310; 30; 20 MG/100ML; MG/100ML; MG/100ML; MG/100ML
INJECTION, SOLUTION INTRAVENOUS CONTINUOUS
Status: DISCONTINUED | OUTPATIENT
Start: 2020-11-21 | End: 2020-11-20 | Stop reason: HOSPADM

## 2020-11-20 RX ORDER — ROPIVACAINE HYDROCHLORIDE 5 MG/ML
INJECTION, SOLUTION EPIDURAL; INFILTRATION; PERINEURAL
Status: DISCONTINUED
Start: 2020-11-20 | End: 2020-11-20 | Stop reason: HOSPADM

## 2020-11-20 RX ORDER — DEXAMETHASONE SODIUM PHOSPHATE 10 MG/ML
INJECTION, SOLUTION INTRAMUSCULAR; INTRAVENOUS PRN
Status: DISCONTINUED | OUTPATIENT
Start: 2020-11-20 | End: 2020-11-20 | Stop reason: SDUPTHER

## 2020-11-20 RX ORDER — ONDANSETRON 2 MG/ML
INJECTION INTRAMUSCULAR; INTRAVENOUS PRN
Status: DISCONTINUED | OUTPATIENT
Start: 2020-11-20 | End: 2020-11-20 | Stop reason: SDUPTHER

## 2020-11-20 RX ORDER — LIDOCAINE HYDROCHLORIDE 10 MG/ML
1 INJECTION, SOLUTION EPIDURAL; INFILTRATION; INTRACAUDAL; PERINEURAL
Status: DISCONTINUED | OUTPATIENT
Start: 2020-11-21 | End: 2020-11-20 | Stop reason: HOSPADM

## 2020-11-20 RX ORDER — OXYCODONE HYDROCHLORIDE AND ACETAMINOPHEN 5; 325 MG/1; MG/1
1-2 TABLET ORAL EVERY 6 HOURS PRN
Qty: 46 TABLET | Refills: 0 | Status: SHIPPED | OUTPATIENT
Start: 2020-11-20 | End: 2020-11-27

## 2020-11-20 RX ORDER — SODIUM CHLORIDE 0.9 % (FLUSH) 0.9 %
10 SYRINGE (ML) INJECTION EVERY 12 HOURS SCHEDULED
Status: DISCONTINUED | OUTPATIENT
Start: 2020-11-20 | End: 2020-11-20 | Stop reason: HOSPADM

## 2020-11-20 RX ORDER — OXYCODONE HYDROCHLORIDE AND ACETAMINOPHEN 5; 325 MG/1; MG/1
1 TABLET ORAL
Status: DISCONTINUED | OUTPATIENT
Start: 2020-11-20 | End: 2020-11-20 | Stop reason: HOSPADM

## 2020-11-20 RX ORDER — SODIUM CHLORIDE 9 MG/ML
INJECTION, SOLUTION INTRAVENOUS CONTINUOUS
Status: DISCONTINUED | OUTPATIENT
Start: 2020-11-21 | End: 2020-11-20

## 2020-11-20 RX ORDER — FENTANYL CITRATE 50 UG/ML
INJECTION, SOLUTION INTRAMUSCULAR; INTRAVENOUS PRN
Status: DISCONTINUED | OUTPATIENT
Start: 2020-11-20 | End: 2020-11-20 | Stop reason: SDUPTHER

## 2020-11-20 RX ORDER — ONDANSETRON 2 MG/ML
4 INJECTION INTRAMUSCULAR; INTRAVENOUS
Status: DISCONTINUED | OUTPATIENT
Start: 2020-11-20 | End: 2020-11-20 | Stop reason: HOSPADM

## 2020-11-20 RX ORDER — FENTANYL CITRATE 50 UG/ML
50 INJECTION, SOLUTION INTRAMUSCULAR; INTRAVENOUS EVERY 5 MIN PRN
Status: DISCONTINUED | OUTPATIENT
Start: 2020-11-20 | End: 2020-11-20 | Stop reason: HOSPADM

## 2020-11-20 RX ORDER — ROPIVACAINE HYDROCHLORIDE 5 MG/ML
INJECTION, SOLUTION EPIDURAL; INFILTRATION; PERINEURAL PRN
Status: DISCONTINUED | OUTPATIENT
Start: 2020-11-20 | End: 2020-11-20 | Stop reason: ALTCHOICE

## 2020-11-20 RX ORDER — LIDOCAINE HYDROCHLORIDE 20 MG/ML
INJECTION, SOLUTION EPIDURAL; INFILTRATION; INTRACAUDAL; PERINEURAL PRN
Status: DISCONTINUED | OUTPATIENT
Start: 2020-11-20 | End: 2020-11-20 | Stop reason: SDUPTHER

## 2020-11-20 RX ORDER — SODIUM CHLORIDE, SODIUM LACTATE, POTASSIUM CHLORIDE, CALCIUM CHLORIDE 600; 310; 30; 20 MG/100ML; MG/100ML; MG/100ML; MG/100ML
INJECTION, SOLUTION INTRAVENOUS CONTINUOUS PRN
Status: DISCONTINUED | OUTPATIENT
Start: 2020-11-20 | End: 2020-11-20 | Stop reason: SDUPTHER

## 2020-11-20 RX ADMIN — HYDROMORPHONE HYDROCHLORIDE 0.5 MG: 1 INJECTION, SOLUTION INTRAMUSCULAR; INTRAVENOUS; SUBCUTANEOUS at 12:41

## 2020-11-20 RX ADMIN — SODIUM CHLORIDE, POTASSIUM CHLORIDE, SODIUM LACTATE AND CALCIUM CHLORIDE: 600; 310; 30; 20 INJECTION, SOLUTION INTRAVENOUS at 06:45

## 2020-11-20 RX ADMIN — SODIUM CHLORIDE, POTASSIUM CHLORIDE, SODIUM LACTATE AND CALCIUM CHLORIDE: 600; 310; 30; 20 INJECTION, SOLUTION INTRAVENOUS at 08:49

## 2020-11-20 RX ADMIN — MIDAZOLAM 2 MG: 1 INJECTION INTRAMUSCULAR; INTRAVENOUS at 08:49

## 2020-11-20 RX ADMIN — SODIUM CHLORIDE, POTASSIUM CHLORIDE, SODIUM LACTATE AND CALCIUM CHLORIDE: 600; 310; 30; 20 INJECTION, SOLUTION INTRAVENOUS at 11:17

## 2020-11-20 RX ADMIN — Medication 100 MCG: at 08:52

## 2020-11-20 RX ADMIN — ONDANSETRON 4 MG: 2 INJECTION, SOLUTION INTRAMUSCULAR; INTRAVENOUS at 12:34

## 2020-11-20 RX ADMIN — LIDOCAINE HYDROCHLORIDE 60 MG: 20 INJECTION, SOLUTION EPIDURAL; INFILTRATION; INTRACAUDAL; PERINEURAL at 08:52

## 2020-11-20 RX ADMIN — PROPOFOL 200 MG: 10 INJECTION, EMULSION INTRAVENOUS at 08:52

## 2020-11-20 RX ADMIN — Medication 100 MCG: at 09:07

## 2020-11-20 RX ADMIN — Medication 50 MCG: at 09:47

## 2020-11-20 RX ADMIN — Medication 50 MCG: at 09:49

## 2020-11-20 RX ADMIN — HYDROMORPHONE HYDROCHLORIDE 0.5 MG: 1 INJECTION, SOLUTION INTRAMUSCULAR; INTRAVENOUS; SUBCUTANEOUS at 12:50

## 2020-11-20 RX ADMIN — CEFAZOLIN 2 G: 10 INJECTION, POWDER, FOR SOLUTION INTRAVENOUS at 09:04

## 2020-11-20 RX ADMIN — Medication 100 MCG: at 09:02

## 2020-11-20 RX ADMIN — DEXAMETHASONE SODIUM PHOSPHATE 10 MG: 10 INJECTION INTRAMUSCULAR; INTRAVENOUS at 08:52

## 2020-11-20 RX ADMIN — HYDROMORPHONE HYDROCHLORIDE 1 MG: 1 INJECTION, SOLUTION INTRAMUSCULAR; INTRAVENOUS; SUBCUTANEOUS at 12:32

## 2020-11-20 ASSESSMENT — PULMONARY FUNCTION TESTS
PIF_VALUE: 19
PIF_VALUE: 21
PIF_VALUE: 20
PIF_VALUE: 19
PIF_VALUE: 20
PIF_VALUE: 19
PIF_VALUE: 21
PIF_VALUE: 22
PIF_VALUE: 2
PIF_VALUE: 20
PIF_VALUE: 21
PIF_VALUE: 19
PIF_VALUE: 22
PIF_VALUE: 15
PIF_VALUE: 21
PIF_VALUE: 20
PIF_VALUE: 20
PIF_VALUE: 21
PIF_VALUE: 21
PIF_VALUE: 19
PIF_VALUE: 15
PIF_VALUE: 21
PIF_VALUE: 16
PIF_VALUE: 17
PIF_VALUE: 21
PIF_VALUE: 7
PIF_VALUE: 21
PIF_VALUE: 22
PIF_VALUE: 21
PIF_VALUE: 21
PIF_VALUE: 20
PIF_VALUE: 20
PIF_VALUE: 22
PIF_VALUE: 20
PIF_VALUE: 20
PIF_VALUE: 16
PIF_VALUE: 18
PIF_VALUE: 21
PIF_VALUE: 21
PIF_VALUE: 19
PIF_VALUE: 5
PIF_VALUE: 21
PIF_VALUE: 20
PIF_VALUE: 22
PIF_VALUE: 16
PIF_VALUE: 21
PIF_VALUE: 22
PIF_VALUE: 22
PIF_VALUE: 21
PIF_VALUE: 22
PIF_VALUE: 2
PIF_VALUE: 19
PIF_VALUE: 20
PIF_VALUE: 22
PIF_VALUE: 19
PIF_VALUE: 15
PIF_VALUE: 20
PIF_VALUE: 21
PIF_VALUE: 20
PIF_VALUE: 20
PIF_VALUE: 22
PIF_VALUE: 22
PIF_VALUE: 20
PIF_VALUE: 20
PIF_VALUE: 21
PIF_VALUE: 22
PIF_VALUE: 21
PIF_VALUE: 21
PIF_VALUE: 20
PIF_VALUE: 17
PIF_VALUE: 19
PIF_VALUE: 20
PIF_VALUE: 20
PIF_VALUE: 19
PIF_VALUE: 20
PIF_VALUE: 29
PIF_VALUE: 2
PIF_VALUE: 21
PIF_VALUE: 21
PIF_VALUE: 22
PIF_VALUE: 22
PIF_VALUE: 15
PIF_VALUE: 21
PIF_VALUE: 21
PIF_VALUE: 20
PIF_VALUE: 21
PIF_VALUE: 19
PIF_VALUE: 15
PIF_VALUE: 15
PIF_VALUE: 21
PIF_VALUE: 20
PIF_VALUE: 21
PIF_VALUE: 11
PIF_VALUE: 20
PIF_VALUE: 20
PIF_VALUE: 21
PIF_VALUE: 7
PIF_VALUE: 15
PIF_VALUE: 20
PIF_VALUE: 19
PIF_VALUE: 16
PIF_VALUE: 15
PIF_VALUE: 20
PIF_VALUE: 7
PIF_VALUE: 19
PIF_VALUE: 21
PIF_VALUE: 21
PIF_VALUE: 15
PIF_VALUE: 21
PIF_VALUE: 18
PIF_VALUE: 20
PIF_VALUE: 22
PIF_VALUE: 18
PIF_VALUE: 18
PIF_VALUE: 21
PIF_VALUE: 21
PIF_VALUE: 18
PIF_VALUE: 20
PIF_VALUE: 15
PIF_VALUE: 22
PIF_VALUE: 22
PIF_VALUE: 21
PIF_VALUE: 20
PIF_VALUE: 19
PIF_VALUE: 21
PIF_VALUE: 2
PIF_VALUE: 20
PIF_VALUE: 22
PIF_VALUE: 5
PIF_VALUE: 22
PIF_VALUE: 21
PIF_VALUE: 22
PIF_VALUE: 21
PIF_VALUE: 19
PIF_VALUE: 21
PIF_VALUE: 2
PIF_VALUE: 21
PIF_VALUE: 20
PIF_VALUE: 21
PIF_VALUE: 21
PIF_VALUE: 22
PIF_VALUE: 7
PIF_VALUE: 19
PIF_VALUE: 21
PIF_VALUE: 21
PIF_VALUE: 16
PIF_VALUE: 21
PIF_VALUE: 20
PIF_VALUE: 15
PIF_VALUE: 21
PIF_VALUE: 20
PIF_VALUE: 21
PIF_VALUE: 20
PIF_VALUE: 22
PIF_VALUE: 2
PIF_VALUE: 17
PIF_VALUE: 1
PIF_VALUE: 21
PIF_VALUE: 19
PIF_VALUE: 9
PIF_VALUE: 0
PIF_VALUE: 19
PIF_VALUE: 19
PIF_VALUE: 22
PIF_VALUE: 1
PIF_VALUE: 14
PIF_VALUE: 21
PIF_VALUE: 22
PIF_VALUE: 20
PIF_VALUE: 21
PIF_VALUE: 15
PIF_VALUE: 21
PIF_VALUE: 0
PIF_VALUE: 15
PIF_VALUE: 22
PIF_VALUE: 21
PIF_VALUE: 22
PIF_VALUE: 19
PIF_VALUE: 22
PIF_VALUE: 11
PIF_VALUE: 15
PIF_VALUE: 21
PIF_VALUE: 20
PIF_VALUE: 21
PIF_VALUE: 21
PIF_VALUE: 16
PIF_VALUE: 19
PIF_VALUE: 21
PIF_VALUE: 16
PIF_VALUE: 20
PIF_VALUE: 15
PIF_VALUE: 21
PIF_VALUE: 8
PIF_VALUE: 21
PIF_VALUE: 15
PIF_VALUE: 20
PIF_VALUE: 20
PIF_VALUE: 15
PIF_VALUE: 21
PIF_VALUE: 22
PIF_VALUE: 19
PIF_VALUE: 21
PIF_VALUE: 21
PIF_VALUE: 20
PIF_VALUE: 21
PIF_VALUE: 20
PIF_VALUE: 21
PIF_VALUE: 20
PIF_VALUE: 20
PIF_VALUE: 19
PIF_VALUE: 16
PIF_VALUE: 20
PIF_VALUE: 21
PIF_VALUE: 22
PIF_VALUE: 20
PIF_VALUE: 16
PIF_VALUE: 20
PIF_VALUE: 22
PIF_VALUE: 21
PIF_VALUE: 21
PIF_VALUE: 0
PIF_VALUE: 20
PIF_VALUE: 21
PIF_VALUE: 18
PIF_VALUE: 20
PIF_VALUE: 18
PIF_VALUE: 23
PIF_VALUE: 20
PIF_VALUE: 19
PIF_VALUE: 22
PIF_VALUE: 20
PIF_VALUE: 20
PIF_VALUE: 22
PIF_VALUE: 21
PIF_VALUE: 20
PIF_VALUE: 20
PIF_VALUE: 19
PIF_VALUE: 17

## 2020-11-20 ASSESSMENT — PAIN SCALES - GENERAL
PAINLEVEL_OUTOF10: 0

## 2020-11-20 ASSESSMENT — PAIN - FUNCTIONAL ASSESSMENT: PAIN_FUNCTIONAL_ASSESSMENT: 0-10

## 2020-11-20 NOTE — H&P
History and Physical Update    Pt Name: Chinita Scheuermann  MRN: [id number]  [place]: 1990  Date of evaluation: 11/20/2020      [x] I have reviewed the Orthopedic Progress Note by Dr Mariah Conley dated 11/9/20 in epic which meets the criteria for an Interval History and Physical note and is attached below. [x] I have examined  Chinita Scheuermann  There are no changes to the patient who is scheduled for a left knee ACL reconstruction arthroscopy with partial lateral menisectomy - Arthrex by Dr Mariah Conley for left knee pain. The patient denies new health changes, fever, chills, wheezing, cough, increased SOB, chest pain, open sores or wounds. Last Ibuprofen 11/6/20     PMH, Surgical History, Social History, Psych, and Family History reviewed and updated in EPIC in appropriate section. Vital signs: BP (!) 143/72   Pulse 61   Temp 96.7 °F (35.9 °C) (Temporal)   Resp 20   Ht 5' 8\" (1.727 m)   Wt 188 lb (85.3 kg)   SpO2 100%   BMI 28.59 kg/m²     Allergies:  Seasonal    Medications:    Prior to Admission medications    Medication Sig Start Date End Date Taking? Authorizing Provider   vitamin D (ERGOCALCIFEROL) 1.25 MG (18575 UT) CAPS capsule  9/9/20  Yes Historical Provider, MD   ibuprofen (ADVIL;MOTRIN) 800 MG tablet Take 1 tablet by mouth every 8 hours as needed for Pain 5/27/19   Merly Matamoros PA-C         This is a 26 y.o. male who is pleasant, cooperative, alert and oriented x3, in no acute distress. Heart: Heart sounds are normal.  HR 61 regular rate and rhythm without murmur, gallop or rub. Lungs: Normal respiratory effort with equal expansion, good air exchange, unlabored and clear to auscultation without wheezes or rales bilaterally   Abdomen: soft, nontender, nondistended with bowel sounds . Labs:  No results for input(s): HGB, HCT, WBC, MCV, PLT, NA, K, CL, CO2, BUN, CREATININE, GLUCOSE, INR, PROTIME, APTT, AST, ALT, LABALBU, HCG in the last 720 hours.     Recent Labs 11/16/20  0720   COVID19 Not Detected       Yemi Macias Silva  APRN, ANP-BC  Electronically signed 11/20/2020 at 8:19 AM      Kunal Blake DO    Physician    Specialty:  Orthopedic Surgery    Progress Notes    Signed    Encounter Date:  11/9/2020          Related encounter: Office Visit from 11/9/2020 in [address] [place] [address] and Sports Medicine          Signed        Expand All Collapse All           [facility]  [address] [place] [address]  Dept: [phone]  Dept Fax: [phone]                                                                             Left Knee - Follow Up/ MRI review     Subjective:           Chief Complaint   Patient presents with    Knee Pain       Left knee MRI review, DOI- 10/20/20      HPI:      Yvonne Natarajan presents today for Left knee pain. The pain has been present since 10/20/2020. The patient recalls a specific injury where the knee was injured after he dunked a basketball when he was playing basketball on 10/10/2020. Patient states that he knew he came down on it wrong but he thought nothing of it at first. He then gave it a week to get better but it did not get better, so he went to [facility] on 10/17/2020 where they got x-rays and they gave him a knee immobilizer, a prescription for Norco, Ibuprofen 400 mg and tylenol. They also informed him that he may have torn his ACL and they referred him to us. The patient has tried ibuprofen, norco, tylenol, a cane and rest with no improvement. The pain is now described as Alberta Dy, and Dull. There is pain on weight bearing. The knee has swelled. There is no painful popping and clicking. The knee has not caught or locked up. The knee has given out. It is not stiff upon arising from sitting. It is painful to go up and down stairs and sit for a prolonged time. The patient has not had a cortisone injection.  The patient has not tried a lubrication injection. The patient has not tried physical therapy. The patient has not had surgery. The patient is here for a MRI review of his left knee. There are no significant changes with the patient's left knee. He states that his knee still feels unstable and that it could give out at any given point. ROS:   Review of Systems   Constitutional: Positive for activity change. Negative for appetite change, fatigue and fever. Respiratory: Negative for apnea, cough, chest tightness and shortness of breath. Cardiovascular: Negative for chest pain, palpitations and leg swelling. Gastrointestinal: Negative for abdominal distention, abdominal pain, constipation, diarrhea, nausea and vomiting. Genitourinary: Negative for difficulty urinating, dysuria and hematuria. Musculoskeletal: Positive for arthralgias and joint swelling. Negative for gait problem and myalgias. Skin: Negative for color change and rash. Neurological: Negative for dizziness, weakness, numbness and headaches. Psychiatric/Behavioral: Negative for sleep disturbance.          Past Medical History:    Past Medical History   Past Medical History:   Diagnosis Date    Anxiety      Bipolar 1 disorder ([facility] [address]      Diarrhea       Resolved (Written 05/14/2019)    Eczema      Hypertension      Lazy eye, right      Poor historian             Past Surgical History:    Past Surgical History         Past Surgical History:   Procedure Laterality Date    BUNIONECTOMY Right 12/1/2017     FOOT BUNIONECTOMY OLIVER / HAMMERTOE REPAIR 2ND TOE & MPJ RELEASE 2ND METATARSAL performed by Polo Rodriguez DPM at [address] [place] [address] Bilateral 5/16/2019     LEFT FOOT LAPIDUS BUNIONECTOMY AND 2ND METATARSAL 2ND DIGIT ARTHROPLASTY, RIGHT 2ND METATARSAL CONDYLECTOMY performed by Abbey Tan DPM at [facility]       removed part of foreign body    FOOT SURGERY Bilateral 05/16/2019      LEFT FOOT LAPIDUS BUNIONECTOMY AND 2ND METATARSAL 2ND DIGIT ARTHROPLASTY, RIGHT 2ND METATARSAL CONDYLECTOMY (Bilateral )    FRACTURE SURGERY Right 5/31/2019     RIGHT  HAND CRPP, RIGHT 3RD, 4TH, 5TH METOCARPAL DISLOCATION performed by Bob Salomon DO at [address] [place] [address] HAND SURGERY Right 05/31/2019     RIGHT  HAND CRPP, RIGHT 3RD, 4TH, 5TH            CurrentMedications:   Current Facility-Administered Medications          Current Outpatient Medications   Medication Sig Dispense Refill    vitamin D (ERGOCALCIFEROL) 1.25 MG (82271 UT) CAPS capsule          Urea (CARMOL) 40 % cream Apply to feet once daily. 1 Tube 3    OXcarbazepine (TRILEPTAL) 600 MG tablet Take 600 mg by mouth 2 times daily        DULoxetine (CYMBALTA) 20 MG extended release capsule Take 1 capsule by mouth daily 60 capsule 0    ibuprofen (ADVIL;MOTRIN) 800 MG tablet Take 1 tablet by mouth every 8 hours as needed for Pain 30 tablet 0      No current facility-administered medications for this visit.             Allergies:    Seasonal     Social History:   Social History   Social History            Socioeconomic History    Marital status:        Spouse name: None    Number of children: None    Years of education: None    Highest education level: None   Occupational History    None   Social Needs    Financial resource strain: None    Food insecurity       Worry: None       Inability: None    Transportation needs       Medical: None       Non-medical: None   Tobacco Use    Smoking status: Current Every Day Smoker       Packs/day: 0.25       Years: 4.00       Pack years: 1.00       Types: Cigarettes       Start date: 11/17/2012    Smokeless tobacco: Never Used    Tobacco comment: 3-4 cig. a day   Substance and Sexual Activity    Alcohol use: None       Comment: Rare    Drug use: Not Currently       Frequency: 7.0 times per week       Types: Marijuana    Sexual activity: None   Lifestyle    Physical activity       Days per week: None       Minutes per session: None    Stress: None   Relationships    Social connections       Talks on phone: None       Gets together: None       Attends Jainism service: None       Active member of club or organization: None       Attends meetings of clubs or organizations: None       Relationship status: None    Intimate partner violence       Fear of current or ex partner: None       Emotionally abused: None       Physically abused: None       Forced sexual activity: None   Other Topics Concern    None   Social History Narrative    None           Family History:  Family History   Family History   Adopted: Yes   Family history unknown: Yes           Vitals:   /83   Pulse 58   Temp 97 °F (36.1 °C)   Ht 5' 8\" (1.727 m)   Wt 188 lb (85.3 kg)   BMI 28.59 kg/m²  Body mass index is 28.59 kg/m². Physical Examination:      Orthopedics:     GENERAL: Alert and oriented X3 in no acute distress. SKIN: Intact without lesions or ulcerations. NEURO: Intact to sensory and motor testing. VASC: Capillary refill is less than 3 seconds. KNEE EXAM     LOCATION: Left Knee  GEN: Alert and oriented X 3, in no acute distress. GAIT: The patient's gait was observed while entering the exam room and was noted to be antalgic. The extremity is in varus alignment. SKIN: Intact without rashes, lesions, or ulcerations. No obvious deformity or swelling. NEURO: The patient responds to light touch throughout left LE. Patellar and Achilles reflexes are 2/4. VASC: The left LE is neurovascularly intact with 2/4 DP and 2/4 PT pulses. Brisk capillary refill. ROM: 5/118 degrees. There is large effusion. MUSC: decreased quad tone  LIGAMENT: Lachman's test is 3+ with Poor endpoint. Anterior drawer Negative. Posterior drawer Negative. There is 2+ varus instability at 0 degrees and 3+ varus instability at 30 degrees. There is 1+ valgus instability at 0 degrees and No valgus instability at 30 degrees. PALP: There is medial and lateral joint line pain.  Pain over the lateral femoral condyle. Assessment:      1. Anterior cruciate ligament complete tear, left, subsequent encounter    2. Tear of lateral meniscus of left knee, current, unspecified tear type, subsequent encounter    3. Sprain of lateral collateral ligament of left knee, subsequent encounter       Procedures:    Procedure: no  Radiology:   MRI taken on 11/04/2020 was reviewed with the patient. Narrative    EXAMINATION:    MRI OF THE LEFT KNEE WITHOUT CONTRAST, 11/4/2020 5:33 pm         TECHNIQUE:    Multiplanar multisequence MRI of the left knee was performed without the    administration of intravenous contrast.         COMPARISON:    None. HISTORY:    ORDERING SYSTEM PROVIDED HISTORY: Rupture of anterior cruciate ligament of    left knee, initial encounter    TECHNOLOGIST PROVIDED HISTORY:    acl tear meniscus tear    Reason for Exam: pain in knee for three weeks after playing basketball    Acuity: Chronic    Type of Exam: Subsequent/Follow-up    Additional signs and symptoms: lateral and medial area pain    Relevant Medical/Surgical History: swelling to left knee. FINDINGS:    MENISCI: Medial meniscus is intact. Complex tear of the posterior horn of    the lateral meniscus with a small fragment flipped and displaced along the    lateral aspect of the joint. CRUCIATE LIGAMENTS: Near complete tear of the anterior cruciate ligament. Posterior cruciate ligament is intact. EXTENSOR MECHANISM: Distal quadriceps tendon is intact. Patellar tendon is    intact. LATERAL COLLATERAL LIGAMENT COMPLEX: Mild sprain of the lateral collateral    ligament. MEDIAL COLLATERAL LIGAMENT COMPLEX: Medial collateral ligament is intact. KNEE JOINT: Large joint effusion. Soft tissue injury along the lateral    aspect of the knee. BONE MARROW: Impaction fracture within the lateral femoral condyle.   Bone    marrow contusions within the medial and lateral tibial plateaus. No    suspicious bone marrow replacing lesion. Impression    Near complete tear of the anterior cruciate ligament with an impaction    fracture of the lateral femoral condyle. Mild sprain of the lateral collateral ligament. Complex tear of the lateral meniscus with a fragment of the posterior horn    suspected to be flipped and displaced. Plan:   Treatment : I reviewed the MRI with the patient. We discussed the etiologies and natural histories of an anterior cruciate ligament tear of the left knee. We discussed the various treatment alternatives including anti-inflammatory medications, physical therapy, injections, further imaging studies and as a last result surgery. During today's visit, I explained to the patient that his MRI does show that he has an ACL tear and tear of his lateral meniscus of his left knee. I then informed the patient that he would benefit from a surgery to reconstruct his ACL and either repair or trim out the lateral meniscus tear. The patient has elected in proceeding with a left knee arthroscopy with an anterior cruciate ligament reconstruction and lateral meniscus repair vs meniscectomy. The risks/benefits/alternatives and potential complications have been discussed with the patient. We also had a long discussion regarding the procedure itself and expectation of the recovery. All questions and concerns with addressed. Patient was instructed to call our office with any question or concerns prior to the procedure occurs. A physical therapy prescription was not given. Patient should return to the clinic 1 week post op for his first post operative appointment with [facility] of the left knee and to follow up with  Cruz Alvarez PA-C. The patient will call the office immediately with any problems. Encounter Medications    No orders of the defined types were placed in this encounter.         No orders of the defined types were placed in this encounter. Trent Rucker MS, AT, ATC, am scribing for and in the presence of Madonna HARDIN. 11/9/2020  9:43 AM           Electronically signed by Bhavna Barahona ATC, on 11/9/2020 at 9:43 AM                  I, Madonna Ann DO, have personally seen this patient and I have reviewed the CC, PMH, FHX and Social History as provided by other clinical staff. I reassessed the HPI and ROS as scribed by Bhavna Barahona ATC in my presence and it is both accurate and complete. Thereafter, I personally performed the PE, reviewed the imaging and established the DX and POC. I agree with the documentation provided by the . I have reviewed all documentation in its entirety prior to providing my signature indicating agreement. Any areas of disagreement are noted on the chart.      Electronically signed by Justo Simon DO on 11/14/2020 at 12:56 PM                  Revision History

## 2020-11-20 NOTE — BRIEF OP NOTE
Brief Postoperative Note      Patient: Funmilayo Ott  YOB: 1990  MRN: [id number]    Date of Procedure: 11/20/2020    Pre-Op Diagnosis: DX LEFT KNEE PAIN    Post-Op Diagnosis: Complete ACL rupture, medial posterior root meniscus tear, complex lateral meniscus tear       Procedure(s):  LEFT KNEE ACL RECONSTRUCTION ARTHROSCOPIC WITH PARTIAL LATERAL MENISECTOMY AND MENISCAL MEDIAL ROOT REPAIR    Surgeon(s):  Ting Gross DO    Assistant:  Resident: Tatyana Finley DO    Anesthesia: General    Estimated Blood Loss (mL): 50 mL    TT: 328 mins    Complications: None    Specimens:   * No specimens in log *    Implants:  Implant Name Type Inv.  Item Serial No.  Lot No. LRB No. Used Action   BUTTON FIX [id number] TI ATTCH SYS ALLGRFT CONSTRUCT FOR  BUTTON FIX [id number] TI ATTCH SYS ALLGRFT CONSTRUCT FOR  [employer] [id number] Left 1 Implanted   SYSTEM IMPL MENIS ROOT REP W/ PEEK SWIVELOCK  SYSTEM IMPL MENIS ROOT REP W/ PEEK SWIVELOCK  [employer] [id number] Left 1 Implanted   BUTTON FIX [id number] TI ATTCH SYS ALLGRFT CONSTRUCT FOR  BUTTON FIX [id number] TI ATTCH SYS ALLGRFT CONSTRUCT FOR  [employer] [id number] Left 1 Implanted   SCREW INTFR L20MM [id number] VENT BIOCOMPOSITE FASTTHREAD  SCREW INTFR L20MM [id number] VENT BIOCOMPOSITE FASTTHREAD  [employer] [id number] Left 1 Implanted   GRAFT BONE SUB [id number] 75DEG TRAP FOR HARV AND PREP OF  GRAFT BONE SUB [id number] 75DEG TRAP FOR HARV AND PREP OF  [employer] [id number] Left 1 Implanted         Drains: * No LDAs found *    Findings: see op note    Electronically signed by Tatyana Finley DO on 11/20/2020 at 1:22 PM

## 2020-11-20 NOTE — ANESTHESIA PRE PROCEDURE
Department of Anesthesiology  Preprocedure Note       Name:  Abby Coleman   Age:  26 y.o.  :  1990                                          MRN:  [id number]         Date:  2020      Surgeon: Lucian Jensen):  Toma Hernadez DO    Procedure: Procedure(s):  LEFT KNEE ACL RECONSTRUCTIONARTHROSCOPIC WITH PARTIAL LATERAL MENISECTOMY- [address]    Medications prior to admission:   Prior to Admission medications    Medication Sig Start Date End Date Taking? Authorizing Provider   vitamin D (ERGOCALCIFEROL) 1.25 MG (00794 UT) CAPS capsule  20  Yes Historical Provider, MD   ibuprofen (ADVIL;MOTRIN) 800 MG tablet Take 1 tablet by mouth every 8 hours as needed for Pain 19   Frederic Webber PA-C       Current medications:    Current Facility-Administered Medications   Medication Dose Route Frequency Provider Last Rate Last Dose    [START ON 2020] lactated ringers infusion   Intravenous Continuous Delight Lyndsey,  mL/hr at 20 0645      sodium chloride flush 0.9 % injection 10 mL  10 mL Intravenous 2 times per day Hamilton Cho DO        sodium chloride flush 0.9 % injection 10 mL  10 mL Intravenous PRN Laura Solorio DO        [START ON 2020] lidocaine PF 1 % injection 1 mL  1 mL Intradermal Once PRN Hamilton Cho DO        ceFAZolin (ANCEF) 2 g in dextrose 5 % 50 mL IVPB  2 g Intravenous Once Toma Hernadez DO        midazolam (VERSED) injection 2 mg  2 mg Intravenous Once Jaclyn Gregorio MD           Allergies:     Allergies   Allergen Reactions    Seasonal        Problem List:    Patient Active Problem List   Diagnosis Code    Bunion, left M21.612    Hammertoe of second toe of right foot M20.41    Metatarsalgia of right foot M77.41    CMC (carpometacarpal joint) dislocation S63.056A    Pain in left foot M79.672    Contracture of joint of left foot M24.575    Hypertrophy of bone, left ankle and foot M89.372    Contracture of joint of right foot M24.574    Lower extremity pain, inferior, right M79.604    Lazy eye, right H53.001    Pain in both feet M79.671, M79.672    Bilateral pes planus M21.41, M21.42    Callus of foot L84    S/P bilateral foot surgery Z98.890       Past Medical History:        Diagnosis Date    Anxiety     Bipolar 1 disorder ([facility] [address]     Diarrhea     Resolved (Written 05/14/2019)    Eczema     Hypertension     Lazy eye, right     Poor historian        Past Surgical History:        Procedure Laterality Date    BUNIONECTOMY Right 12/1/2017    FOOT BUNIONECTOMY OLIVER / HAMMERTOE REPAIR 2ND TOE & MPJ RELEASE 2ND METATARSAL performed by Jess Brown DPM at [address] [place] [address] Bilateral 5/16/2019    LEFT FOOT LAPIDUS BUNIONECTOMY AND 2ND METATARSAL 2ND DIGIT ARTHROPLASTY, RIGHT 2ND METATARSAL CONDYLECTOMY performed by Salvador Betancourt DPM at [place] [address] Right     removed part of foreign body    FOOT SURGERY Bilateral 05/16/2019     LEFT FOOT LAPIDUS BUNIONECTOMY AND 2ND METATARSAL 2ND DIGIT ARTHROPLASTY, RIGHT 2ND METATARSAL CONDYLECTOMY (Bilateral )    FRACTURE SURGERY Right 5/31/2019    RIGHT  HAND CRPP, RIGHT 3RD, 4TH, 5TH METOCARPAL DISLOCATION performed by Rg York DO at [address] [place] [address] HAND SURGERY Right 05/31/2019    RIGHT  HAND CRPP, RIGHT 3RD, 4TH, 5TH        Social History:    Social History     Tobacco Use    Smoking status: Current Every Day Smoker     Packs/day: 0.25     Years: 4.00     Pack years: 1.00     Types: Cigarettes     Start date: 11/17/2012    Smokeless tobacco: Never Used    Tobacco comment: 3-4 cig. a day   Substance Use Topics    Alcohol use: Not on file     Comment: Rare                                Ready to quit: Not Answered  Counseling given: Not Answered  Comment: 3-4 cig. a day      Vital Signs (Current):   Vitals:    11/20/20 0610 11/20/20 0624   BP: (!) 143/72    Pulse: 61    Resp: 20    Temp: 96.7 °F (35.9 °C)    TempSrc: Temporal    SpO2: 100%    Weight:  188 lb (85.3 kg) Height:  5' 8\" (1.727 m)                                              BP Readings from Last 3 Encounters:   11/20/20 (!) 143/72   11/09/20 129/83   10/29/20 (!) 143/82       NPO Status: Time of last liquid consumption: 2000                        Time of last solid consumption: 2000                        Date of last liquid consumption: 11/19/20                        Date of last solid food consumption: 11/19/20    BMI:   Wt Readings from Last 3 Encounters:   11/20/20 188 lb (85.3 kg)   11/09/20 188 lb (85.3 kg)   10/29/20 190 lb (86.2 kg)     Body mass index is 28.59 kg/m². CBC:   Lab Results   Component Value Date    WBC 5.6 05/14/2019    RBC 4.50 05/14/2019    HGB 14.6 05/14/2019    HCT 42.9 05/14/2019    MCV 95.3 05/14/2019    RDW 13.0 05/14/2019     05/14/2019       CMP:   Lab Results   Component Value Date     01/24/2019    K 4.2 01/24/2019     01/24/2019    CO2 20 01/24/2019    BUN 16 01/24/2019    CREATININE 0.94 01/24/2019    GFRAA >60 01/24/2019    LABGLOM >60 01/24/2019    GLUCOSE 93 01/24/2019    PROT 6.9 01/24/2019    CALCIUM 9.2 01/24/2019    BILITOT 0.38 01/24/2019    ALKPHOS 93 01/24/2019    AST 24 01/24/2019    ALT 24 01/24/2019       POC Tests: No results for input(s): POCGLU, POCNA, POCK, POCCL, POCBUN, POCHEMO, POCHCT in the last 72 hours.     Coags: No results found for: PROTIME, INR, APTT    HCG (If Applicable): No results found for: PREGTESTUR, PREGSERUM, HCG, HCGQUANT     ABGs: No results found for: PHART, PO2ART, SXR7JNN, ATG2JAN, BEART, U4BQCELO     Type & Screen (If Applicable):  No results found for: LABABO, LABRH    Drug/Infectious Status (If Applicable):  No results found for: HIV, HEPCAB    COVID-19 Screening (If Applicable):   Lab Results   Component Value Date    COVID19 Not Detected 11/16/2020         Anesthesia Evaluation    Airway: Mallampati: I  TM distance: >3 FB   Neck ROM: full  Mouth opening: > = 3 FB Dental:          Pulmonary: Cardiovascular:                      Neuro/Psych:               GI/Hepatic/Renal:             Endo/Other:                     Abdominal:           Vascular:                                        Anesthesia Plan      general     ASA 2             Anesthetic plan and risks discussed with patient.                       Alfreda Norwood MD   11/20/2020

## 2020-11-22 NOTE — OP NOTE
Operative Note      Patient: Bud Stiles  YOB: 1990  MRN: [id number]    Date of Procedure: 11/20/2020    Pre-Op Diagnosis: DX LEFT KNEE ACL tear    Post-Op Diagnosis: Left knee ACL tear, left knee medial meniscus root avulsion tear.,  Left knee lateral meniscus tear, left knee chondromalacia medial femoral condyle, left knee chondromalacia lateral femoral condyle       Procedure(s):  LEFT KNEE ACL RECONSTRUCTION ARTHROSCOPIC WITH PARTIAL LATERAL MENISECTOMY AND MENISCAL MEDIAL ROOT REPAIR    Surgeon(s):  Maria L Cason DO    Assistant:   Resident: Shyann Null DO    Anesthesia: General    Estimated Blood Loss (mL): 50    Complications: None    Specimens:   * No specimens in log *    Implants:  Implant Name Type Inv. Item Serial No.  Lot No. LRB No. Used Action   BUTTON FIX [id number] TI ATTCH SYS ALLGRFT CONSTRUCT FOR  BUTTON FIX [id number] TI ATTCH SYS ALLGRFT CONSTRUCT FOR  [employer] [id number] Left 1 Implanted   SYSTEM IMPL MENIS ROOT REP W/ PEEK SWIVELOCK  SYSTEM IMPL MENIS ROOT REP W/ PEEK SWIVELOCK  [employer] [id number] Left 1 Implanted   BUTTON FIX [id number] TI ATTCH SYS ALLGRFT CONSTRUCT FOR  BUTTON FIX [id number] TI ATTCH SYS ALLGRFT CONSTRUCT FOR  [employer] [id number] Left 1 Implanted   SCREW INTFR L20MM [id number] VENT BIOCOMPOSITE FASTTHREAD  SCREW INTFR L20MM [id number] VENT BIOCOMPOSITE FASTTHREAD  [employer] [id number] Left 1 Implanted   GRAFT BONE SUB [id number] 75DEG TRAP FOR HARV AND PREP OF  GRAFT BONE SUB [id number] 75DEG TRAP FOR HARV AND PREP OF  [employer] [id number] Left 1 Implanted         Drains: * No LDAs found *    Findings: See postoperative diagnosis    Detailed Description of Procedure: The patient understands the risk and benefits of the procedure. Informed consent was signed. Operative site was marked. Preoperative antibiotics were given. Patient was taken to the operative suite and placed the supine position operative table.   General inhalation anesthetic with endotracheal intubation was performed. Exam under anesthesia revealed a 3+ Lachman test with a positive pivot shift. There is no other associated instabilities noted. A well-padded tourniquet was placed on the left proximal thigh. Left leg was placed in arthroscopic leg sahu. Right leg was padded under the buttock. The foot of the bed was dropped 90 degrees. The patient was prepped and draped in normal sterile fashion. Midline incision was made from the inferior pole of the patella to the tibial tubercle. The patellar tendon was exposed IN the central third 10 mm was harvested with a 10 x 25 mm proximal patella bone plug and a 10 x 20 mm distal tibial bone plug. The graft was prepared on the back table for a Ar[employer] BTB tight rope with the 20 mm plug for the femoral tunnel. Diagnostic arthroscopy was performed through 2 portals. Suprapatellar pouch was free of loose bodies medial lateral gutter were free of loose bodies the patella had excellent tracking with a few cartilage from Bernardo peripherally that were not even able to be considered chondromalacia. The medial compartment was entered. There was a 6 x 8 area medial femoral condyle of grade II chondromalacia where it appeared to have impinged on a shifting medial tibial spine. There was some mild softening of the end of the medial femoral condyle weightbearing surface. The medial tibial plateau was intact. The medial meniscus was intact except for a bony posterior meniscal root tear. Passport cannulas were placed. 2 0 FiberWire link sutures were placed with a meniscal scorpion. Root guide was placed and drill hole was performed. Passing suture was pulled out the medial portal and the sutures were passed through the tunnel out the medial tibia securing the meniscal root which was then repaired at the end of procedure with a 4.75 mm swivel lock anchor into the tibia.   Intercondylar notch was entered the ACL was completely and accessory sutures from the tibial secured into a 4.75 mm swivel lock and inserted into the tibia. There was now a negative Lachman test.  There was impingement free range of motion of visualization. There was excellent ACL tension. There is no loose bodies present in the joint. The patella harvest site was bone grafted from the shavings. Tibial harvest site was bone grafted with a bio conductive wedge. The patellar tendon defect was closed with 0 Vicryl suture. Subtenons tissue was closed with 0 Vicryl 2-0 Vicryl 3-0 Monocryl suture. Skin glue used and allowed to harden. Sterile dressing was placed. Polar Care was placed. Ace wrap and hinged knee brace locked in extension. The patient was awakened by department anesthesia and transferred to the postanesthesia care unit in stable condition.     Electronically signed by Cele Lemus DO on 11/22/2020 at 5:50 PM

## 2020-12-01 ENCOUNTER — OFFICE VISIT (OUTPATIENT)
Dept: ORTHOPEDIC SURGERY | Age: 30
End: 2020-12-01

## 2020-12-01 VITALS
SYSTOLIC BLOOD PRESSURE: 141 MMHG | HEART RATE: 102 BPM | DIASTOLIC BLOOD PRESSURE: 98 MMHG | WEIGHT: 188 LBS | BODY MASS INDEX: 28.49 KG/M2 | HEIGHT: 68 IN | TEMPERATURE: 97.9 F

## 2020-12-01 PROCEDURE — 99024 POSTOP FOLLOW-UP VISIT: CPT | Performed by: PHYSICIAN ASSISTANT

## 2020-12-01 RX ORDER — OXYCODONE HYDROCHLORIDE AND ACETAMINOPHEN 5; 325 MG/1; MG/1
1-2 TABLET ORAL EVERY 6 HOURS PRN
Qty: 32 TABLET | Refills: 0 | Status: SHIPPED | OUTPATIENT
Start: 2020-12-01 | End: 2020-12-08

## 2020-12-01 ASSESSMENT — ENCOUNTER SYMPTOMS
NAUSEA: 0
VOMITING: 0
ABDOMINAL DISTENTION: 0
COUGH: 0
CONSTIPATION: 0
APNEA: 0
ABDOMINAL PAIN: 0
COLOR CHANGE: 0
CHEST TIGHTNESS: 0
DIARRHEA: 0
SHORTNESS OF BREATH: 0

## 2020-12-01 NOTE — PROGRESS NOTES
[facility]  [address]  [facility] [address]  Dept: [phone]  Dept Fax: [phone]        Post Operative Follow Up    Subjective:     Chief Complaint   Patient presents with    Knee Pain     Left knee scope DOS- 11/20/20     Post Op Surgery:     The patient is here for a follow up after having a Left Knee ACL reconstruction arthroscopic with partial lateral meniscectomy and meniscal medial root repair. The date of surgery was on 11/20/2020. Therefore we are 11 days post op. Currently the patient's pain is controlled with percocet. Physical therapy was not started but he starts Monday 12/07/2020. Patient presents today with crutches and brace that is in good repair. Patient states that he did not attend PT because he did not know he was supposed to go right after surgery. He also states that he was told not to weight bear for the first 6 weeks and he has been doing his best to do that. Review of Systems   Constitutional: Positive for activity change. Negative for appetite change. Respiratory: Negative for apnea, cough, chest tightness and shortness of breath. Cardiovascular: Negative for chest pain, palpitations and leg swelling. Gastrointestinal: Negative for abdominal distention, abdominal pain, constipation, diarrhea, nausea and vomiting. Genitourinary: Negative for difficulty urinating, dysuria and hematuria. Musculoskeletal: Positive for arthralgias, gait problem and joint swelling. Negative for myalgias. Skin: Negative for color change and rash. Neurological: Negative for dizziness, weakness, numbness and headaches. Psychiatric/Behavioral: Positive for sleep disturbance. I have reviewed the CC, HPI, ROS, PMH, FHX, Social History, and if not present in this note, I have reviewed in the patient's chart.  I agree with the documentation provided by other staff and have reviewed their documentation prior to providing my signature indicating agreement. Vitals:   BP (!) 141/98   Pulse 102   Temp 97.9 °F (36.6 °C)   Ht 5' 8\" (1.727 m)   Wt 188 lb (85.3 kg)   BMI 28.59 kg/m²  Body mass index is 28.59 kg/m². Physical Examination:     Orthopedics:    GENERAL: Alert and oriented X3 in no acute distress. SKIN: Intact without lesions or ulcerations. Portal sites are well healed with no signs of infection. NEURO: Intact to sensory and motor testing. VASC: Capillary refill is less than 3 seconds. Post Op Exam:    LOCATION: Left Knee  SITE: Distal neurocirculatory status is intact. EXAM: Sensation is intact to light touch, there is full motor function of the extremity. ROM: 15/50 degrees  Procedures:     Procedure:  No    Radiology:   Xr Knee Left (min 4 Views)    Result Date: 12/2/2020  KNEE - ACL X-RAY 4 views of the left knee including AP, bilateral tunnel, and lateral in the upright position, and skyline views reveal anatomic alignment with no fracture or dislocation. Kellgren grade I changes of osteoarthritis (joint space narrowing, osteophyte, subchondral sclerosis, bony deformity/cyst) of the tricompartment(s). No osseous loose bodies. No bony erosion or periosteal reaction. No soft tissue masses. The ACL tunnels are noted in appropriate position. The hardware is intact without signs of complication. Postop surgical changes of the meniscal root repair  Impression: Post-operative changes of ACL reconstruction and meniscal root repair of the left knee. Assessment:     1. S/P ACL reconstruction      Plan:   Post Op Treatment : Patient had the treatment regimen reviewed today 11 days s/p Left Knee ACL reconstruction arthroscopic with partial lateral meniscectomy and meniscal medial root repair. I reviewed the films with the patient. We discussed some of the etiologies and natural histories of s/p let knee arthroscopy.  We also discussed the various treatment alternatives including anti-inflammatory medications, physical therapy, injections, further imaging studies and as a last resort surgery. During today's visit, I reviewed the operative report and I gave the patient a copy for his records. I then explained to the patient that he should not be weight bearing on the knee and he should always wear the brace anytime he is up and moving. In addition, I instructed him to use the crutches to ambulate anytime he is up moving and he should allow the knee to bend up to 90 degrees but no more than that when he is sitting. I also told the patient that he needs to work on flexing and extending his knee passively with his other leg because since he did not begin therapy, he is significantly behind on his recovery. I then told him that he should do the exercises three times a day for ten minutes so he may regain more of his ROM this week before he starts therapy on Monday. Patient was also informed he will not be allowed to weight bear for 6 weeks because the knee is healing. The patient then stated that he understands the plan and at this time, the patient has opted for a Tubigrip and he will begin attending physical therapy on Monday 12/07/2020 with his brace locked in extension nonweight bearing but they are to work with him and get him to 0 degrees of extension and to 90 degrees of flexion when he is there. The patient will also do the knee flexion and extension exercises that I showed him today when he is at home and he will do the exercises three times a day. Lastly, the patient was informed that they may now wash their incision with soap and water but they should refrain from submerging their incision due to the risk of infection. Patient is approximately a week behind and for the last 11 days has not removed his brace to do any exercises so he is very stiff and painful. They state that the earliest physical therapy could get him in his next week at [facility]. Lesly.   Patient should return to the office in 3-4 weeks to f/u with Robert Calderón D.O. The patient will call the office immediately with any problems that may arise. No orders of the defined types were placed in this encounter. Orders Placed This Encounter   Procedures    XR KNEE LEFT (MIN 4 VIEWS)     Standing Status:   Future     Number of Occurrences:   1     Standing Expiration Date:   12/1/2021     I, Edward Day V, am scribing for and in the presence of Marco A Nur PA-C. 12/1/2020  4:23 PM    IMarco A PA-C, have personally seen this patient, reviewed the chart including history, and imaging if done. I personally  performed the physical exam and obtained any needed additional history. I placed orders, performed or supervised procedures and developed the treatment plan.     Electronically signed by Kiana Low PA-C, on 12/2/2020 at 10:25 AM      Electronically signed by Josh Yepez, on 12/1/2020 at 4:23 PM

## 2020-12-07 ENCOUNTER — HOSPITAL ENCOUNTER (OUTPATIENT)
Dept: PHYSICAL THERAPY | Age: 30
Setting detail: THERAPIES SERIES
Discharge: HOME | End: 2020-12-07
Payer: MEDICARE

## 2020-12-07 PROCEDURE — 97110 THERAPEUTIC EXERCISES: CPT

## 2020-12-07 PROCEDURE — 97161 PT EVAL LOW COMPLEX 20 MIN: CPT

## 2020-12-07 NOTE — CONSULTS
[x] [facility]) - [facility] &  Therapy  [address]  [phone]  F: [phone] [] [address]  [facility] [address]   [address]  P: [phone]  F: [phone] [] [address] &  Therapy  [address]  P: [phone]  F: [phone] [] [address]  P: [phone]  F: [phone] [] [address]  [facility]   [address]   [place]: [phone]  F: [phone]      Physical Therapy Lower Extremity Evaluation    Date:  2020  Patient: Smith Woods  : 1990  MRN: [id number]  Physician: Dr. Ave Black DO, Dr. Norton Robert: Norma Melgar, 30 visits  Medical Diagnosis: s/p ACL reconstruction    Rehab Codes: M 25.562, M 25.662, R 60.0, M 62.81, R 26.2  Onset date: 20    Next 's appt.: 20    Subjective:   CC: Having trouble sleeping, pain, swelling, crutches, no trips or fall. Struggles with 15 steps to apartment. Scooting down steps on bottom. Percocet and ibuprofen for pain currently. Cannot do ADLs and IADLs without assistance. Left buttocks going numb - thinks it is from not being able to use left leg muscles. HPI: (20) Injury during basketball - thinks he hyperextended it.    Surgery:  Post-Op Diagnosis: Left knee ACL tear, left knee medial meniscus root avulsion tear.,  Left knee lateral meniscus tear, left knee chondromalacia medial femoral condyle, left knee chondromalacia lateral femoral condyle       Procedure(s):  LEFT KNEE ACL RECONSTRUCTION ARTHROSCOPIC WITH PARTIAL LATERAL MENISECTOMY AND MENISCAL MEDIAL ROOT REPAIR    PMHx: [] Unremarkable [] Diabetes [x] HTN  [] Pacemaker   [] MI/Heart Problems [] Cancer [] Arthritis [x] Other: Anxiety, (+) tobacco, bipolar, lazy right eye [x] Refer to full medical chart  In EPIC     Comorbidities:   [] Obesity [] Dialysis  [] N/A   [] Asthma/COPD [] Dementia [] Other:   [] Stroke [] Sleep apnea [] Other:   [] Vascular disease [] Rheumatic disease [] Other:     Tests: [] X-Ray: [] MRI:  [] Other:    Medications: [x] Refer to full medical record [] None [] Other:  Allergies:      [x] Refer to full medical record [] None [] Other:    Function:  Hand Dominance  [x] Right  [] Left  Patient lives with: Wife   In what type of home [x]  One story   [] Two story   [] Split level   Number of stairs to enter 15   With handrail on the []  Right to enter   [] Left to enter   Bathroom has a []  Tub only  [x] Tub/shower combo   [] Walk in shower    []  Grab bars   Washing machine is on []  Main level   [] Second level   [x] Basement   Employer    Job Status []  Normal duty   [] Light duty   [] Off due to condition    []  Retired   [x] Not employed   [] Disability  [] Other:  []  Return to work:    Work activities/duties      ADL/IADL Previous level of function Current level of function Who currently assists the patient with task   Bathing  [x] Independent  [] Assist [] Independent  [x] Assist    Dress/grooming [x] Independent  [] Assist [] Independent  [x] Assist    Transfer/mobility [x] Independent  [] Assist [x] Independent  [] Assist    Feeding [x] Independent  [] Assist [x] Independent  [] Assist    Toileting [x] Independent  [] Assist [x] Independent  [] Assist    Driving [x] Independent  [] Assist [] Independent  [x] Assist    Housekeeping [x] Independent  [] Assist [] Independent  [x] Assist    Grocery shop/meal prep [x] Independent  [] Assist [] Independent  [x] Assist      Gait Prior level of function Current level of function    [x] Independent  [] Assist [x] Independent  [] Assist   Device: [] Independent [] Independent    [x] Straight Cane [] Quad cane [] Straight Cane [] Quad cane    [] Standard walker [] Rolling walker   [] 4 wheeled walker [] Standard walker [] Rolling walker   [x] crutches     [] Wheelchair [] Wheelchair     Using straight cane before surgery due to foot surgeries in May 2019 (was wheelchair bound but never got off the cane)    Pain:  [x] Yes  [] No Location: Left knee pain   Pain Rating: (0-10 scale) 3-9+/10  Pain altered Tx:  [] Yes  [x] No  Action:    Symptoms:  [] Improving [] Worsening [x] Same  Better:  [] AM    [] PM    [] Sit    [] Rise/Sit    []Stand    [] Walk    [] Lying    [x] Other: ice and medication  Worse: [] AM    [] PM    [] Sit    [] Rise/Sit    []Stand    [] Walk    [] Lying    [] Bend                      [] Valsalva    [x] Other: positions  Sleep: [] OK    [x] Disturbed    Objective:    ROM  ° A/P STRENGTH    Left Right Left Right   Hip Flex 90 90 4    Knee Flex 72  3    Ext Lack 13  3    Hamstring flexibility Lack 23         Non- effected Initial, in cm Re-eval,in cm Comments   Suprapatellar  43.3     Joint line  39.5     Infrapatellar  36.8       OBSERVATION No Deficit Deficit Not Tested Comments   Posture       Forward Head [x] [] []    Rounded Shoulders [] [x] []    Kyphosis [x] [] []    Lordosis [x] [] []    Lateral Shift [] [] [x]    Scoliosis [] [] [x]    Iliac Crest [] [] [x]    PSIS [] [] [x]    ASIS [] [] [x]    Genu Valgus [] [] [x]    Genu Varus [] [] [x]    Genu Recurvatum [] [] [x]    Pronation [] [] [x]    Supination [] [] [x]    Leg Length Discrp [] [] [x]    Slumped Sitting [] [x] []    Palpation [] [x] [] Tender around incision   Sensation [x] [] []    Edema [] [x] [] See above   Neurological [x] [] []    Patellar Mobility [] [x] [] Decreased    Patellar Orientation [x] [] [] Normal   Gait [] [x] [] Analysis:NWB of L LE with crutches     FUNCTION Normal Difficult Unable   Sitting [x] [] []   Standing [] [x] []   Ambulation [] [x] []   Groom/Dress [] [x] []   Lift/Carry [] [x] []   Stairs [] [x] []   Bending [] [x] []   Squat [] [] [x]   Kneel [] [] [x]     Functional Test: LEFS Score: 77% functionally impaired     Comments:    Assessment:  Patient would benefit from skilled physical therapy services in order to: improve ROM, improve strength, improve gait, decrease pain, improve ability to complete ADLs and IADLs. Problems:    [x] ? Pain:  [x] ? ROM:  [x] ? Strength:  [x] ? Function:  [] Other:       STG: (to be met in 12 treatments)  1. ? Pain: Left knee pain improve to 7/10 at max with active movements  2. ? ROM: Left knee extension improve to 0 degrees. Left knee flexion improve to 90 degrees  3. ? Strength: Patient able to complete SLR without quad lag  4. ? Function:  Patient able to independently don/doff shoes and socks  5. Patient to be independent with home exercise program as demonstrated by performance with correct form without cues. LTG: (to be met in 24 treatments)  1. Left knee pain improve to 3/10 at max with walking  2. Gait improve to baseline without antalgia  3. Left knee flexion improve to 110 degrees  4. Patient able to go up/down steps reciprocally  5. Left knee strength grossly 4+/5  6. LEFS score of 25% functionally impaired or less. Patient goals: \"To get back 100% without pain\"    Rehab Potential:  [x] Good  [] Fair  [] Poor   Suggested Professional Referral:  [x] No  [] Yes:  Barriers to Goal Achievement:  [x] No  [] Yes:  Domestic Concerns:  [x] No  [] Yes:    Pt. Education:  [x] Plans/Goals, Risks/Benefits discussed  [x] Home exercise program    Method of Education: [x] Verbal  [x] Demo  [x] Written -- see chart below  Comprehension of Education:  [] Verbalizes understanding. [] Demonstrates understanding. [x] Needs Review. [] Demonstrates/verbalizes understanding of HEP/Ed previously given.     Treatment Plan:  [x] Therapeutic Exercise   46901  [] Iontophoresis: 4 mg/mL Dexamethasone Sodium Phosphate  mAmin  08738   [x] Therapeutic Activity  52382 [x] Vasopneumatic cold with compression  25844    [x] Gait Training   66936 [] Ultrasound   [id number]   [x] Neuromuscular Re-education  [id number] [] Electrical Stimulation Unattended  61668   [x] Manual Therapy  06863 [x] Electrical Stimulation Attended  [id number]   [x] Instruction in HEP  [] Lumbar/Cervical Traction  [id number]   [] Aquatic Therapy   [id number] [] Cold/hotpack    [] Massage   07400      [] Dry Needling, 1 or 2 muscles  26713   [] Biofeedback, first 15 minutes   34154  [] Biofeedback, additional 15 minutes   27256 [] Dry Needling, 3 or more muscles  14081     []  Medication allergies reviewed for use of    Dexamethasone Sodium Phosphate 4mg/ml     with iontophoresis treatments. Pt is not allergic. Frequency:  3 x/week for 24 visits. May decrease to twice weekly as patient improves. Todays Treatment:  Modalities:   Precautions:  Non-weight bearing, braced locked in extension for 6 weeks while walking. Allowed to move 0-90 degrees in therapy. Exercises:  Exercise Reps/ Time Weight/ Level Comments   SAQ 10 x  HEP   Quad set 10 x 3 sec HEP   Prone knee flexion 10 x  HEP    Prone hip extension 10 x  HEP   Prone glut set 10 x  HEP   Seated knee flexion 10 x 3 sec HEP   Scar massage 3 min  HEP, demo and had patient complete in clinic   Edema mobilization 3 min     Other:  Encouragement provided to patient. He understands how to do scar massage and edema massage around knee. Specific Instructions for next treatment:  Conservative due to medial meniscal repair. Mat exercises, standing OKC. After 6 weeks and physician clearance, OK for weight bearing exercises. Goal is to obtain 0-90 degrees ROM. Add vaso due to edema. May need attended estim if quad contraction does not improve. Gait training when OK for weight bearing. Measure right knee flex/ext/hamstring flexibility and edema.       Evaluation Complexity:  History (Personal factors, comorbidities) [] 0 [x] 1-2 [] 3+   Exam (limitations, restrictions) [] 1-2 [x] 3 [] 4+   Clinical presentation (progression) [x] Stable [] Evolving [] Unstable   Decision Making [x] Low [] Moderate [] High    [x] Low Complexity [] Moderate Complexity [] High Complexity       Treatment Charges: Mins Units   [x] Evaluation       [x]  Low       []  Moderate       []  High 20 1   []  Modalities     [x]  Ther Exercise 30 2   [x]  Manual Therapy 6 0   []  Ther Activities     []  Aquatics     []  Vasocompression     []  Other       TOTAL TREATMENT TIME: 64    Time in:1024   Time MP    Electronically signed by: Aydee Soliz PT        Physician Signature:________________________________Date:__________________  By signing above or cosigning this note, I have reviewed this plan of care and certify a need for medically necessary rehabilitation services.      *PLEASE SIGN ABOVE AND FAX BACK ALL PAGES*

## 2020-12-08 ENCOUNTER — HOSPITAL ENCOUNTER (OUTPATIENT)
Dept: PHYSICAL THERAPY | Age: 30
Setting detail: THERAPIES SERIES
Discharge: HOME | End: 2020-12-08
Payer: MEDICARE

## 2020-12-08 NOTE — FLOWSHEET NOTE
[x] [facility]) [facility] &  Therapy  [address]    [phone]  F: [phone]   [] [address]  [facility]   [address]  P: [phone]  F: [phone]  [] [address] &  Therapy  [address] [facility]  P: [phone]  F: [phone] [] [address]  P: [phone]  F: [phone]  [] [address]  [facility]   [address]   [place]: [phone]  F: [phone]   [] [place] [address] [facility] [address]  [place]: [phone]   F: [phone]     Physical Therapy Cancel/No Show note    Date: 2020  Patient: Ernst Darling  : 1990  MRN: [id number]    Cancels/No Shows to date:     For today's appointment patient:    [x]  Cancelled    [] Rescheduled appointment    [] No-show     Reason given by patient:    []  Patient ill    []  Conflicting appointment    [] No transportation      [] Conflict with work    [x] No reason given    [] Weather related    [] COVID-19    [] Other:      Comments:        [x] Next appointment was confirmed    Electronically signed by: Kaitlin Macdonald PTA

## 2020-12-10 ENCOUNTER — HOSPITAL ENCOUNTER (OUTPATIENT)
Dept: PHYSICAL THERAPY | Age: 30
Setting detail: THERAPIES SERIES
Discharge: HOME | End: 2020-12-10
Payer: MEDICARE

## 2020-12-10 PROCEDURE — 97016 VASOPNEUMATIC DEVICE THERAPY: CPT

## 2020-12-10 PROCEDURE — 97110 THERAPEUTIC EXERCISES: CPT

## 2020-12-10 NOTE — FLOWSHEET NOTE
[x] [facility]) [facility] &  Therapy  [address]  [phone]  F: [phone] [] [address]  [place] [address]   [address]  P: [phone]  F: [phone] [] [address] &  Therapy  [address]  P: [phone]  F: [phone] [] [address]  P: [phone]  F: [phone] [] [address]  [facility]   [address]   [place]: [phone]  F: [phone]      Physical Therapy Daily Treatment Note    Date:  12/10/2020  Patient Name:  Emily Hein    :  1990  MRN: [id number]  Physician: Dr. Kaitlin Carbajal, DO, Dr. Mateo Fish: Khushi Carrion, 30 visits  Medical Diagnosis: s/p ACL reconstruction               Rehab Codes: M 25.562, M 25.662, R 60.0, M 62.81, R 26.2  Onset date: 20               Next 's appt.: 20  Visit# / total visits:      Cancels/No Shows: 1/0    Subjective:    Pain:  [x] Yes  [] No Location: L knee Pain Rating: (0-10 scale) 7-8/10  Pain altered Tx:  [x] No  [] Yes  Action:  Comments: Patient states pain in knee is 7-8/10 upon arrival this date with stiffness. Objective:  Modalities: Game ready to left knee supine wedge under legs for 15 minutes after exercises 34 degrees, medium pressure. Precautions: Non-weight bearing, braced locked in extension for 6 weeks while walking. Allowed to move 0-90 degrees in therapy.   Exercises:  Exercise Reps/ Time Weight/ Level Comments   Nustep 5 mins L1 Knee brace 0-90         Seated      LAQs 10x     Hamstring stretch 3x 20 sec stool   Seated knee flexion            Supine      Quad sets 10x 3 sec    SAQs 10x     Heel slides 10x  Orange slide tube   SLR 10x     Add sets 10x 3 sec    Bridges 10x Prone      Knee flexion stretch 3x 20 sec    Knee flexion 10x     Hip extension 10x     Glut set 10x 3 sec    Other:    L Knee ROM:  Extension lacking 11°, flexion 75°    Right knee:   Flexion 126°, extension 0°  Hamstring flexibility lacking 5°  Edema:   Supra 40.5  Joint line 37  Infra 34    Treatment Charges: Mins Units   []  Modalities     [x]  Ther Exercise 41 3   []  Manual Therapy     []  Ther Activities     []  Aquatics     [x]  Vasocompression 15 1   []  Other     Total Treatment time 56 mins 4       Assessment: [x] Progressing toward goals. Initiated exercises per log this date to increase knee ROM and strengthening. Reviewed exercises previously given to patient as exercises are still new to patient. Knee ROM limited and quad weakness noted. [] No change. [] Other:    [x] Patient would continue to benefit from skilled physical therapy services in order to: improve ROM, improve strength, improve gait, decrease pain, improve ability to complete ADLs and IADLs.      STG: (to be met in 12 treatments)  1. ? Pain: Left knee pain improve to 7/10 at max with active movements  2. ? ROM: Left knee extension improve to 0 degrees. Left knee flexion improve to 90 degrees  3. ? Strength: Patient able to complete SLR without quad lag  4. ? Function:  Patient able to independently don/doff shoes and socks  5. Patient to be independent with home exercise program as demonstrated by performance with correct form without cues.     LTG: (to be met in 24 treatments)  1. Left knee pain improve to 3/10 at max with walking  2. Gait improve to baseline without antalgia  3. Left knee flexion improve to 110 degrees  4. Patient able to go up/down steps reciprocally  5. Left knee strength grossly 4+/5  6. LEFS score of 25% functionally impaired or less. Patient goals: \"To get back 100% without pain\"    Pt.  Education:  [x] Yes  [] No  [x] Reviewed Prior HEP/Ed  Method of Education: [x] Verbal  [x] Demo  [] Written  Comprehension of Education:  [x] Verbalizes understanding. [x] Demonstrates understanding. [x] Needs review. [] Demonstrates/verbalizes HEP/Ed previously given. Plan: [x] Continue current frequency toward long and short term goals. [x] Specific Instructions for subsequent treatments: Conservative due to medial meniscal repair. standing OKC. After 6 weeks and physician clearance, OK for weight bearing exercises. Goal is to obtain 0-90 degrees ROM. Add vaso due to edema. May need attended estim if quad contraction does not improve. Gait training when OK for weight bearing. Measure right knee flex/ext/hamstring flexibility and edema.       Time In: 8:04 am            Time Out: 9:05 am    Electronically signed by:  Terri Riedel, PTA

## 2020-12-11 ENCOUNTER — TELEMEDICINE (OUTPATIENT)
Dept: FAMILY MEDICINE CLINIC | Age: 30
End: 2020-12-11
Payer: MEDICARE

## 2020-12-11 ENCOUNTER — TELEPHONE (OUTPATIENT)
Dept: FAMILY MEDICINE CLINIC | Age: 30
End: 2020-12-11

## 2020-12-11 PROCEDURE — G8427 DOCREV CUR MEDS BY ELIG CLIN: HCPCS | Performed by: FAMILY MEDICINE

## 2020-12-11 PROCEDURE — 99213 OFFICE O/P EST LOW 20 MIN: CPT | Performed by: FAMILY MEDICINE

## 2020-12-11 ASSESSMENT — ENCOUNTER SYMPTOMS
COLOR CHANGE: 0
RHINORRHEA: 0
SINUS PRESSURE: 0
SHORTNESS OF BREATH: 0
NAUSEA: 0
SORE THROAT: 0
ABDOMINAL PAIN: 0
ABDOMINAL DISTENTION: 0
DIARRHEA: 0
BACK PAIN: 0
EYE DISCHARGE: 0
EYE PAIN: 0
CONSTIPATION: 0
CHEST TIGHTNESS: 0
VOMITING: 0
VOICE CHANGE: 0

## 2020-12-11 ASSESSMENT — PATIENT HEALTH QUESTIONNAIRE - PHQ9
SUM OF ALL RESPONSES TO PHQ QUESTIONS 1-9: 0
SUM OF ALL RESPONSES TO PHQ QUESTIONS 1-9: 0
1. LITTLE INTEREST OR PLEASURE IN DOING THINGS: 0
2. FEELING DOWN, DEPRESSED OR HOPELESS: 0
SUM OF ALL RESPONSES TO PHQ9 QUESTIONS 1 & 2: 0
SUM OF ALL RESPONSES TO PHQ QUESTIONS 1-9: 0

## 2020-12-11 NOTE — TELEPHONE ENCOUNTER
Dr. Romero Williamson called and stated that the patient was requesting pain medication and she was trying to explain to him that she does not prescribe and was going to discuss other options. Pt left his VV and Dr. Romero Williamson wanted to me see if he was done with the visit. After speaking with the patient he said he was done with her and finding a new doctor and hung up on me. Dr Romero Williamson was informed.

## 2020-12-11 NOTE — PROGRESS NOTES
Subjective:      Patient ID: Chinita Scheuermann is a 26 y.o. male. VV with patient after obtaining consent. HPI  Here for follow up of chronic pain issues, Vit D deficiency as well as mood and anxiety disorders related to chronic pain. Has had multiple problems with his knees ( ACL tear and repair) as well as B/L feet for which he has seen podiatrists for a while. States recently had ACl repair and left knee meniscus repair by dr Kesha Castro nov 20 th. Has been going for pt 3 times a week till. States still has a brace and cannot put weight on the left leg.pain is always about a 9/10- takes percocets 5 mg every 6 hours . States he does not have any appt with ortho at this time and thinks he may need more rx of the pain meds to help him through,  . When I mentioned I will not be able to prescribe pain meds for him and that he will need to continue seeing his orthopedic for that he got upset and signed out of the chart. I will have office contact him to see if he wants to speak to me again . I suggested cymbalta that may help with his chronic pain but he states his mood is fine and does not want to try anything else   Review of Systems   Constitutional: Negative for activity change, appetite change and fatigue. HENT: Negative for congestion, dental problem, hearing loss, rhinorrhea, sinus pressure, sneezing, sore throat, tinnitus and voice change. Eyes: Negative for pain, discharge and visual disturbance. Respiratory: Negative for chest tightness and shortness of breath. Cardiovascular: Positive for leg swelling. Negative for chest pain and palpitations. Gastrointestinal: Negative for abdominal distention, abdominal pain, constipation, diarrhea, nausea and vomiting. Endocrine: Negative for cold intolerance and heat intolerance. Genitourinary: Negative for decreased urine volume, difficulty urinating, frequency and urgency. Musculoskeletal: Positive for arthralgias, gait problem and myalgias. Negative for back pain and joint swelling. Skin: Negative for color change, pallor and rash. Allergic/Immunologic: Negative for environmental allergies and food allergies. Neurological: Negative for dizziness, tremors, weakness and headaches. Hematological: Negative for adenopathy. Does not bruise/bleed easily. Psychiatric/Behavioral: Negative for agitation, behavioral problems and sleep disturbance. The patient is not nervous/anxious. Objective:   Physical Exam  Constitutional:       Appearance: Normal appearance. HENT:      Head: Normocephalic and atraumatic. Pulmonary:      Effort: Pulmonary effort is normal.   Neurological:      General: No focal deficit present. Mental Status: He is alert and oriented to person, place, and time. Mental status is at baseline. Psychiatric:         Mood and Affect: Mood normal.         Behavior: Behavior normal.         Thought Content: Thought content normal.         Judgment: Judgment normal.         Assessment:       Diagnosis Orders   1. S/P bilateral foot surgery     2. Bipolar 1 disorder (HCC)     3. Pain in both feet     4. Vitamin D deficiency     5. Hypertension, unspecified type     6. JOANNA (generalized anxiety disorder)     7. Overweight (BMI 25.0-29.9)     8. Adjustment disorder, unspecified type           Plan:      No orders of the defined types were placed in this encounter. Outpatient Encounter Medications as of 12/11/2020   Medication Sig Dispense Refill    vitamin D (ERGOCALCIFEROL) 1.25 MG (57073 UT) CAPS capsule       ibuprofen (ADVIL;MOTRIN) 800 MG tablet Take 1 tablet by mouth every 8 hours as needed for Pain 30 tablet 0     No facility-administered encounter medications on file as of 12/11/2020.             Amelie Crespo MD

## 2020-12-15 ENCOUNTER — HOSPITAL ENCOUNTER (OUTPATIENT)
Dept: PHYSICAL THERAPY | Age: 30
Setting detail: THERAPIES SERIES
Discharge: HOME | End: 2020-12-15
Payer: MEDICARE

## 2020-12-15 PROCEDURE — 97110 THERAPEUTIC EXERCISES: CPT

## 2020-12-15 NOTE — FLOWSHEET NOTE
[x] [facility]) - [facility] &  Therapy  [address]  [phone]  F: [phone] [] [address]  [facility] [address]   [address]  P: [phone]  F: [phone] [] [address] &  Therapy  [address]  P: [phone]  F: [phone] [] [address]  P: [phone]  F: [phone] [] [address]  [facility]   [address]   [place]: [phone]  F: [phone]      Physical Therapy Daily Treatment Note    Date:  12/15/2020  Patient Name:  Caleb Mcgowan    :  1990  MRN: [id number]  Physician: Dr. Cesario Alexandra, DO, Dr. Miguel Angel Méndez: Eileen Mcginnis, 30 visits  Medical Diagnosis: s/p ACL reconstruction               Rehab Codes: M 25.562, M 25.662, R 60.0, M 62.81, R 26.2  Onset date: 20               Next 's appt.: 20  Visit# / total visits: 3/24     Cancels/No Shows: 1/0    Subjective:    Pain:  [x] Yes  [] No Location: L knee Pain Rating: (0-10 scale) 6/10  Pain altered Tx:  [x] No  [] Yes  Action:  Comments: Patient arrived 15 minutes late this date. No soreness after last session. Patient notes he went to the ER yesterday to get pain meds. Patient noting that he feels like he can put weight on his leg now, educated patient he is unable until he sees the doctor. Objective:  Modalities: Game ready to left knee supine wedge under legs for 15 minutes after exercises 34 degrees, medium pressure. --Patient declined this date   Precautions: Non-weight bearing, braced locked in extension for 6 weeks while walking. Allowed to move 0-90 degrees in therapy.   Exercises:  Exercise Reps/ Time Weight/ Level Comments   Nustep   5 mins L1 Knee brace 0-90         Standing   Open chain  added 3/10 at max with walking  2. Gait improve to baseline without antalgia  3. Left knee flexion improve to 110 degrees  4. Patient able to go up/down steps reciprocally  5. Left knee strength grossly 4+/5  6. LEFS score of 25% functionally impaired or less. Patient goals: \"To get back 100% without pain\"    Pt. Education:  [x] Yes  [] No  [x] Reviewed Prior HEP/Ed  Method of Education: [x] Verbal  [x] Demo  [] Written  Comprehension of Education:  [x] Verbalizes understanding. [x] Demonstrates understanding. [x] Needs review. [] Demonstrates/verbalizes HEP/Ed previously given. Plan: [x] Continue current frequency toward long and short term goals. [x] Specific Instructions for subsequent treatments: Conservative due to medial meniscal repair. After 6 weeks and physician clearance, OK for weight bearing exercises. Goal is to obtain 0-90 degrees ROM. Add vaso due to edema. May need attended estim if quad contraction does not improve. Gait training when OK for weight bearing.       Time In: 10:15 am            Time Out: 10:52 am    Electronically signed by:  Juliocesar Forde PTA

## 2020-12-21 RX ORDER — OXYCODONE HYDROCHLORIDE AND ACETAMINOPHEN 5; 325 MG/1; MG/1
1 TABLET ORAL EVERY 8 HOURS PRN
Qty: 21 TABLET | Refills: 0 | OUTPATIENT
Start: 2020-12-21 | End: 2020-12-28

## 2020-12-21 NOTE — TELEPHONE ENCOUNTER
Called back patient. Dr Daniel Mckay is not going to refill pain medication.  Advised tylenol for pain

## 2020-12-21 NOTE — TELEPHONE ENCOUNTER
Left ACLR with medial meniscal root repair    DOS: 11/20/2020    Last refill 12/1/2020    Was given 8 tablets at [facility] on 12/14/2020

## 2020-12-21 NOTE — TELEPHONE ENCOUNTER
Patient called requesting a refill of his pain medication. It appears he was recently seen at another ED on 12/14/20 for pain. Patient states he has pain with physical therapy. He was suppose to see Dr. Kristen Voss for follow up on the 24th however has not rescheduled.     Please call the patient regarding his refill request. Thanks

## 2020-12-29 ENCOUNTER — APPOINTMENT (OUTPATIENT)
Dept: PHYSICAL THERAPY | Age: 30
End: 2020-12-29
Payer: MEDICARE

## 2020-12-31 ENCOUNTER — APPOINTMENT (OUTPATIENT)
Dept: PHYSICAL THERAPY | Age: 30
End: 2020-12-31
Payer: MEDICARE

## 2021-01-18 ENCOUNTER — OFFICE VISIT (OUTPATIENT)
Dept: ORTHOPEDIC SURGERY | Age: 31
End: 2021-01-18

## 2021-01-18 VITALS
HEIGHT: 68 IN | DIASTOLIC BLOOD PRESSURE: 86 MMHG | BODY MASS INDEX: 28.49 KG/M2 | HEART RATE: 73 BPM | SYSTOLIC BLOOD PRESSURE: 135 MMHG | WEIGHT: 188 LBS | TEMPERATURE: 97.2 F

## 2021-01-18 DIAGNOSIS — Z98.890 S/P ACL RECONSTRUCTION: Primary | ICD-10-CM

## 2021-01-18 PROCEDURE — 99024 POSTOP FOLLOW-UP VISIT: CPT | Performed by: ORTHOPAEDIC SURGERY

## 2021-01-18 ASSESSMENT — ENCOUNTER SYMPTOMS
CHEST TIGHTNESS: 0
COUGH: 0
NAUSEA: 0
APNEA: 0
VOMITING: 0
COLOR CHANGE: 0
CONSTIPATION: 0
RESPIRATORY NEGATIVE: 1
DIARRHEA: 0
ABDOMINAL PAIN: 0
SHORTNESS OF BREATH: 0
ABDOMINAL DISTENTION: 0

## 2021-01-18 NOTE — PROGRESS NOTES
[facility]  [address]  [place] [address]  Dept: [phone]  Dept Fax: [phone]        Post Operative Follow Up    Subjective:     Chief Complaint   Patient presents with    Knee Pain     Left ACL Repair DOS- 11/20/20     Post Op Surgery:     The patient is here for a follow up after having a Left Knee ACL reconstruction arthroscopic with partial lateral meniscectomy and meniscal medial root repair. The date of surgery was on 11/20/2020. Therefore we are 8 weeks post op. Currently the patient's pain is controlled with ibuprofen and Tylenol. Physical therapy was attended twice and the rest of the appointments he no showed and has been discharged. Patient presents today with brace that is in good repair. Patient states they are feeling better. He went to the [address] [place] [address] on 12/14/2020 for acute knee pain and stated that he ran out of pain medication. He received 8 percocet and was told to follow up with our office and this is the first time he has been seen. Review of Systems   Constitutional: Positive for activity change. Negative for appetite change, fatigue and fever. Respiratory: Negative. Negative for apnea, cough, chest tightness and shortness of breath. Cardiovascular: Negative. Negative for chest pain, palpitations and leg swelling. Gastrointestinal: Negative for abdominal distention, abdominal pain, constipation, diarrhea, nausea and vomiting. Genitourinary: Negative for difficulty urinating, dysuria and hematuria. Musculoskeletal: Positive for arthralgias and gait problem. Negative for joint swelling and myalgias. Skin: Negative for color change and rash. Neurological: Negative for dizziness, weakness, numbness and headaches. Psychiatric/Behavioral: Negative for sleep disturbance.        I have reviewed the CC, HPI, ROS, PMH, FHX, Social History, and if not present in this note, I have reviewed in the patient's chart. I agree with the documentation provided by other staff and have reviewed their documentation prior to providing my signature indicating agreement. Vitals:   /86   Pulse 73   Temp 97.2 °F (36.2 °C)   Ht 5' 8\" (1.727 m)   Wt 188 lb (85.3 kg)   BMI 28.59 kg/m²  Body mass index is 28.59 kg/m². Physical Examination:     Orthopedics:    GENERAL: Alert and oriented X3 in no acute distress. SKIN: Intact without lesions or ulcerations. Incision is well healed with no signs of infection. NEURO: Intact to sensory and motor testing. VASC: Capillary refill is less than 3 seconds. Post Op Exam:    LOCATION: Left knee  SITE: Distal neurocirculatory status is intact. EXAM: Sensation is intact to light touch, there is full motor function of the extremity. ROM: 10/102 degrees. Negative Lachman's with a solid end point. Procedures:     Procedure:  No    Radiology:   No results found. Assessment:     1. S/P ACL reconstruction      Plan:   Post Op Treatment : Patient had the treatment regimen reviewed today 8 weeks s/p Left Knee ACL reconstruction arthroscopic with partial lateral meniscectomy and meniscal medial root repair. I reviewed the films with the patient. We discussed some of the etiologies and natural histories of recovery after an ACL reconstruction. We also discussed the various treatment alternatives including anti-inflammatory medications, physical therapy, injections, further imaging studies and as a last resort surgery. During today's visit, we discussed that is really important that he gets his knee straight. He can stop wearing his brace. I feel it important that he returns to physical therapy. He would like to switch physical therapy places. He will be given an external referral for physical therapy. We showed him a couple exercises that he could do on his own to get his knee straight.  The patient then stated that they understand the plan and at this time, the patient has opted getting a new physical therapy prescription. An Rx refill  was not given and he should not need narcotic pain medications for therapy. It was emphasized again that the patient should not be playing basketball or doing any type of physical activity until he is at least 6 months postop. Patient should return to the office in 4 weeks to f/u with  Cyril Barksdale PA-C and at that visit, patient should completely straight and at least 115 degrees of bend. The patient will call the office immediately with any problems that may arise. No orders of the defined types were placed in this encounter. No orders of the defined types were placed in this encounter. Cyril FELICIANO PA-C, am scribing for and in the presence of Mazin ARAMBULA OJodi. 1/18/2021  12:17 PM      I, Mazin Torres DO, have personally seen this patient and I have reviewed the CC, PMH, FHX and Social History as provided by other clinical staff. I reassessed the HPI and ROS as scribed by Cyril Barksdale PA-C in my presence and it is both accurate and complete. Thereafter, I personally performed the PE, reviewed the imaging and established the DX and POC. I agree with the documentation provided by the Physician Assistant. I have reviewed all documentation in its entirety prior to providing my signature indicating agreement. Any areas of disagreement are noted on the chart.     Electronically signed by Janell Landa DO on 1/24/2021 at 12:09 PM        Electronically signed by Piedad Tomas PA-C, on 1/18/2021 at 12:17 PM

## 2021-03-17 ENCOUNTER — OFFICE VISIT (OUTPATIENT)
Dept: PODIATRY | Age: 31
End: 2021-03-17
Payer: MEDICARE

## 2021-03-17 VITALS — RESPIRATION RATE: 16 BRPM | WEIGHT: 188 LBS | TEMPERATURE: 98.1 F | BODY MASS INDEX: 28.49 KG/M2 | HEIGHT: 68 IN

## 2021-03-17 DIAGNOSIS — D23.71 BENIGN NEOPLASM OF SKIN OF RIGHT FOOT: ICD-10-CM

## 2021-03-17 DIAGNOSIS — R26.2 DIFFICULTY WALKING: ICD-10-CM

## 2021-03-17 DIAGNOSIS — B35.1 DERMATOPHYTOSIS OF NAIL: ICD-10-CM

## 2021-03-17 DIAGNOSIS — M79.672 BILATERAL FOOT PAIN: Primary | ICD-10-CM

## 2021-03-17 DIAGNOSIS — M79.671 BILATERAL FOOT PAIN: Primary | ICD-10-CM

## 2021-03-17 DIAGNOSIS — L60.0 INGROWN NAIL: ICD-10-CM

## 2021-03-17 DIAGNOSIS — D23.72 BENIGN NEOPLASM OF SKIN OF LOWER LIMB, INCLUDING HIP, LEFT: ICD-10-CM

## 2021-03-17 PROCEDURE — 99213 OFFICE O/P EST LOW 20 MIN: CPT | Performed by: PODIATRIST

## 2021-03-17 PROCEDURE — G8427 DOCREV CUR MEDS BY ELIG CLIN: HCPCS | Performed by: PODIATRIST

## 2021-03-17 PROCEDURE — G8419 CALC BMI OUT NRM PARAM NOF/U: HCPCS | Performed by: PODIATRIST

## 2021-03-17 PROCEDURE — G8484 FLU IMMUNIZE NO ADMIN: HCPCS | Performed by: PODIATRIST

## 2021-03-17 PROCEDURE — 17110 DESTRUCTION B9 LES UP TO 14: CPT | Performed by: PODIATRIST

## 2021-03-17 PROCEDURE — 11721 DEBRIDE NAIL 6 OR MORE: CPT | Performed by: PODIATRIST

## 2021-03-17 PROCEDURE — 4004F PT TOBACCO SCREEN RCVD TLK: CPT | Performed by: PODIATRIST

## 2021-03-17 NOTE — PROGRESS NOTES
[facility]YSICIANS  [facility]  [address] [place] [address]  Dept: [phone]  Dept Fax: [phone]     PAIN PROGRESS NOTE  Date of patient's visit: 3/17/2021  Patient's Name:  Felice Elder YOB: 1990            Patient Care Team:  Trisha Cotter MD as PCP - General (Family Medicine)  Trisha Cotter MD as PCP - [facility] Empaneled Provider  Kizzy Butts DPM as Physician (Podiatry)  Anupam Barnett DPM as Physician (Podiatry)      Chief Complaint   Patient presents with    Benign Neoplasm       Subjective: This Felice Elder comes to clinic for foot and nail care. Pt currently has complaint of thickened, painful, elongated nails that he/she cannot manage by themselves. Pt. Relates pain to nails with shoe gear. Pt's primary care physician is Jagdeep Pak MD last seen 12/11/20. Pt has a new complaint of increased painful skin lesion toya feet. Pt has tried changing shoes but it has not helped the pain    Past Medical History:   Diagnosis Date    Anxiety     Bipolar 1 disorder ([address]     Diarrhea     Resolved (Written 05/14/2019)    Eczema     Hypertension     Lazy eye, right     Poor historian        Allergies   Allergen Reactions    Seasonal      Current Outpatient Medications on File Prior to Visit   Medication Sig Dispense Refill    vitamin D (ERGOCALCIFEROL) 1.25 MG (93199 UT) CAPS capsule       ibuprofen (ADVIL;MOTRIN) 800 MG tablet Take 1 tablet by mouth every 8 hours as needed for Pain 30 tablet 0     No current facility-administered medications on file prior to visit. Review of Systems. Review of Systems:   History obtained from chart review and the patient  General ROS: negative for - chills, fatigue, fever, night sweats or weight gain  Constitutional: Negative for chills, diaphoresis, fatigue, fever and unexpected weight change. Musculoskeletal: Positive for arthralgias, gait problem and joint swelling.   Neurological ROS: negative for - behavioral changes, confusion, headaches or seizures. Negative for weakness and numbness. Dermatological ROS: negative for - mole changes, rash  Cardiovascular: Negative for leg swelling. Gastrointestinal: Negative for constipation, diarrhea, nausea and vomiting. Objective:  Dermatologic Exam:  Soft tissue lesion to the plantar right and left foot with central core and petechiae. Pain on palpation of lesion. Skin No rashes or nodules noted. .   Skin is thin, with flaky sloughing skin as well as decreased hair growth to the lower leg  Small red hemosiderin deposits seen dorsal foot   Musculoskeletal:     1st MPJ ROM decreased, Bilateral.  Muscle strength 5/5, Bilateral.  Pain present upon palpation of toenails 1-5, Bilateral. decreased medial longitudinal arch, Bilateral.  Ankle ROM decreased,Bilateral.    Dorsally contracted digits present digits 2, Bilateral.     Vascular: DP pulses 1/4 bilateral.  PT pulses 0/4 bilateral.   CFT <5 seconds, Bilateral.  Hair growth absent to the level of the digits, Bilateral.  Edema present, Bilateral.  Varicosities absent, Bilateral. Erythema absent, Bilateral    Neurological: Sensation diminshed to light touch to level of digits, Bilateral.  Protective sensation intact 6/10 sites via 5.07/10g Matthews-Spike Monofilament, Bilateral.  negative Tinel's, Bilateral.  negative Valleix sign, Bilateral.      Integument: Warm, dry, supple, Bilateral.  Open lesion absent, Bilateral.  Interdigital maceration absent to web spaces 4, Bilateral.  Nails 1-5 left and 1-5 right thickened > 3.0 mm, dystrophic and crumbly, discolored with subungual debris. Fissures absent, Bilateral.   General: AAO x 3 in NAD.     Derm  Toenail Description  Sites of Onychomycosis Involvement (Check affected area)  [x] [x] [x] [x] [x] [x] [x] [x] [x] [x]  5 4 3 2 1 1 2 3 4 5                          Right Left    Thickness  [x] [x] [x] [x] [x] [x] [x] [x] [x] [x]  5 4 3 2 1 1 2 3 4 5                         Right                                        Left    Dystrophic Changes   [x] [x] [x] [x] [x] [x] [x] [x] [x] [x]  5 4 3 2 1 1 2 3 4 5                         Right                                        Left    Color  [x] [x] [x] [x] [x] [x] [x] [x] [x] [x]  5 4 3 2 1 1 2 3 4 5                          Right                                        Left    Incurvation/Ingrowin   [] [] [] [] [] [] [] [] [] []  5 4 3 2 1 1 2 3 4 5                         Right                                        Left    Inflammation/Pain   [x] [x] [x] [x] [x] [x] [x] [x] [x] [x]  5 4 3  2 1 1 2 3 4 5                         Right                                        Left       Nails are painful 1-10 with direct palpation. Q7   []Yes  []No                Q8   [x]Yes  []No                     Q9   []Yes    []No  Assessment:  26 y.o. male with:    Diagnosis Orders   1. Bilateral foot pain  55118 - NE DESTRUCTION BENIGN LESIONS UP TO 14    37270 - NE DEBRIDEMENT OF NAILS, 6 OR MORE   2. Difficulty walking  51094 - NE DEBRIDEMENT OF NAILS, 6 OR MORE   3. Benign neoplasm of skin of right foot  82706 - NE DESTRUCTION BENIGN LESIONS UP TO 14   4. Benign neoplasm of skin of lower limb, including hip, left  61093 - NE DESTRUCTION BENIGN LESIONS UP TO 14   5. Dermatophytosis of nail  48824 - NE DEBRIDEMENT OF NAILS, 6 OR MORE   6. Ingrown nail  76611 - NE DEBRIDEMENT OF NAILS, 6 OR MORE         Plan:   Pt was evaluated and examined. Patient was given personalized discharge instructions. The lesion was partially excised and Pyrogallic acid was applied under occlusion. The patient will leave in place for 24-48 hours and than remove. The patient tolerated the procedure well and without complication. Nails 1-10 were debrided in length and thickness sharply with a nail nipper and  without incident.  Pt will follow up in 9 weeks or sooner if any problems arise. Diagnosis was discussed with the pt and all of their questions were answered in detail. Proper foot hygiene and care was discussed with the pt. Patient to check feet daily and contact the office with any questions/problems/concerns. Other comorbidity noted and will be managed by PCP. Pain waiver discussed with patient and confirmed.    3/17/2021      Electronically signed by Bianka Rider DPM on 3/17/2021 at 10:57 AM  3/17/2021

## 2021-05-24 ENCOUNTER — OFFICE VISIT (OUTPATIENT)
Dept: PODIATRY | Age: 31
End: 2021-05-24
Payer: MEDICARE

## 2021-05-24 VITALS — HEIGHT: 68 IN | WEIGHT: 188 LBS | RESPIRATION RATE: 16 BRPM | BODY MASS INDEX: 28.49 KG/M2

## 2021-05-24 DIAGNOSIS — D23.71 BENIGN NEOPLASM OF SKIN OF RIGHT FOOT: ICD-10-CM

## 2021-05-24 DIAGNOSIS — M79.671 BILATERAL FOOT PAIN: Primary | ICD-10-CM

## 2021-05-24 DIAGNOSIS — M79.672 BILATERAL FOOT PAIN: Primary | ICD-10-CM

## 2021-05-24 DIAGNOSIS — D23.72 BENIGN NEOPLASM OF SKIN OF LOWER LIMB, INCLUDING HIP, LEFT: ICD-10-CM

## 2021-05-24 PROCEDURE — 17110 DESTRUCTION B9 LES UP TO 14: CPT | Performed by: PODIATRIST

## 2021-05-24 NOTE — PROGRESS NOTES
[facility] PHYSICIANS  [facility]  [address] [facility] [address] [place] [address]  Dept: [phone]  Dept Fax: [phone]     FOOT PAIN & BENIGN NEOPLASM PROGRESS NOTE  Date of patient's visit: 5/24/2021  Patient's Name:  Efrain De León YOB: 1990            Patient Care Team:  Genaro Villasenor MD as PCP - General (Family Medicine)  Genaro Villasenor MD as PCP - [facility]  Zay Gary DPM as Physician (Podiatry)  Srinath Alvarado DPM as Physician (Podiatry)          Chief Complaint   Patient presents with    Benign Neoplasm    Foot Pain       Subjective: This Efrain De León comes to clinic for foot and nail care. Pt's primary care physician is Mateus Sullivan, [address] [place] [address] seen 12/11/2020. He states skin lesions are painful to toya feet. Pain worse with walking. Past Medical History:   Diagnosis Date    Anxiety     Bipolar 1 disorder ([facility] [address]     Diarrhea     Resolved (Written 05/14/2019)    Eczema     Hypertension     Lazy eye, right     Poor historian          Allergies   Allergen Reactions    Seasonal      Current Outpatient Medications on File Prior to Visit   Medication Sig Dispense Refill    vitamin D (ERGOCALCIFEROL) 1.25 MG (56519 UT) CAPS capsule       ibuprofen (ADVIL;MOTRIN) 800 MG tablet Take 1 tablet by mouth every 8 hours as needed for Pain 30 tablet 0     No current facility-administered medications on file prior to visit. Review of Systems    Review of Systems:   History obtained from chart review and the patient  General ROS: negative for - chills, fatigue, fever, night sweats or weight gain  Constitutional: Negative for chills, diaphoresis, fatigue, fever and unexpected weight change. Musculoskeletal: Positive for arthralgias, gait problem and joint swelling. Neurological ROS: negative for - behavioral changes, confusion, headaches or seizures. Negative for weakness and numbness.    Dermatological ROS: negative for - mole changes, rash  Cardiovascular: Negative for leg swelling. Gastrointestinal: Negative for constipation, diarrhea, nausea and vomiting. Objective:  Dermatologic Exam:  Soft tissue lesion to the plantar right and left foot with central core and petechiae. Pain on palpation of lesion. Skin No rashes or nodules noted. .   Skin is thin, with flaky sloughing skin as well as decreased hair growth to the lower leg  Small red hemosiderin deposits seen dorsal foot   Musculoskeletal:     1st MPJ ROM decreased, Bilateral.  Muscle strength 5/5, Bilateral.  Pain present upon palpation of toenails 1-5, Bilateral. decreased medial longitudinal arch, Bilateral.  Ankle ROM decreased,Bilateral.    Dorsally contracted digits present digits 2, Bilateral.     Vascular: DP pulses 1/4 bilateral.  PT pulses 0/4 bilateral.   CFT <5 seconds, Bilateral.  Hair growth absent to the level of the digits, Bilateral.  Edema present, Bilateral.  Varicosities absent, Bilateral. Erythema absent, Bilateral    Neurological: Sensation diminshed to light touch to level of digits, Bilateral.  Protective sensation intact 6/10 sites via 5.07/10g [place]-Spike Monofilament, Bilateral.  negative Tinel's, Bilateral.  negative Valleix sign, Bilateral.      Integument: Warm, dry, supple, Bilateral.  Open lesion absent, Bilateral.  Interdigital maceration absent to web spaces 4, Bilateral.  Nails 1-5 left and 1-5 right thickened > 3.0 mm, dystrophic and crumbly, discolored with subungual debris. Fissures absent, Bilateral.   General: AAO x 3 in NAD.     Derm  Toenail Description  Sites of Onychomycosis Involvement (Check affected area)  [x] [x] [x] [x] [x] [x] [x] [x] [x] [x]  5 4 3 2 1 1 2 3 4 5                          Right                                        Left    Thickness  [x] [x] [x] [x] [x] [x] [x] [x] [x] [x]  5 4 3 2 1 1 2 3 4 5                         Right                                        Left    Dystrophic Changes [x] [x] [x] [x] [x] [x] [x] [x] [x] [x]  5 4 3 2 1 1 2 3 4 5                         Right                                        Left    Color  [x] [x] [x] [x] [x] [x] [x] [x] [x] [x]  5 4 3 2 1 1 2 3 4 5                          Right                                        Left    Incurvation/Ingrowin   [] [] [] [] [] [] [] [] [] []  5 4 3 2 1 1 2 3 4 5                         Right                                        Left    Inflammation/Pain   [x] [x] [x] [x] [x] [x] [x] [x] [x] [x]  5 4 3  2 1 1 2 3 4 5                         Right                                        Left       Nails are painful 1-10 with direct palpation. Q7   []Yes  []No                Q8   [x]Yes  []No                     Q9   []Yes    []No    Assessment:  26 y.o. male with:    Diagnosis Orders   1. Bilateral foot pain  64489 - [place] DESTRUCTION BENIGN LESIONS UP TO 14   2. Benign neoplasm of skin of right foot  08769 - [place] DESTRUCTION BENIGN LESIONS UP TO 14   3. Benign neoplasm of skin of lower limb, including hip, left  60785 - [place] DESTRUCTION BENIGN LESIONS UP TO 14         Plan:   Pt was evaluated and examined. Patient was given personalized discharge instructions. Diagnosis was discussed with the pt and all of their questions were answered in detail. Proper foot hygiene and care was discussed with the pt. Patient to check feet daily and contact the office with any questions/problems/concerns. Other comorbidity noted and will be managed by PCP. Pain waiver discussed with patient and confirmed. 5/24/2021    The lesion was partially debrided and silver nitrate was applied with an apperature pad under occlusion. The patient will leave in place for 24-48 hours and than remove. The patient tolerated the procedure well and without complication.    Pt will follow up in 9 weeks         Electronically signed by Desmond Mills DPM on 5/24/2021 at 8:17 AM  5/24/2021

## 2021-10-11 ENCOUNTER — OFFICE VISIT (OUTPATIENT)
Dept: PODIATRY | Age: 31
End: 2021-10-11
Payer: MEDICARE

## 2021-10-11 VITALS — BODY MASS INDEX: 28.49 KG/M2 | RESPIRATION RATE: 16 BRPM | HEIGHT: 68 IN | WEIGHT: 188 LBS

## 2021-10-11 DIAGNOSIS — D23.72 BENIGN NEOPLASM OF SKIN OF LOWER LIMB, INCLUDING HIP, LEFT: ICD-10-CM

## 2021-10-11 DIAGNOSIS — D23.71 BENIGN NEOPLASM OF SKIN OF RIGHT FOOT: ICD-10-CM

## 2021-10-11 DIAGNOSIS — M79.672 BILATERAL FOOT PAIN: Primary | ICD-10-CM

## 2021-10-11 DIAGNOSIS — B35.1 DERMATOPHYTOSIS OF NAIL: ICD-10-CM

## 2021-10-11 DIAGNOSIS — R26.2 DIFFICULTY WALKING: ICD-10-CM

## 2021-10-11 DIAGNOSIS — M79.671 BILATERAL FOOT PAIN: Primary | ICD-10-CM

## 2021-10-11 PROCEDURE — 17110 DESTRUCTION B9 LES UP TO 14: CPT | Performed by: PODIATRIST

## 2021-10-11 PROCEDURE — G8419 CALC BMI OUT NRM PARAM NOF/U: HCPCS | Performed by: PODIATRIST

## 2021-10-11 PROCEDURE — G8427 DOCREV CUR MEDS BY ELIG CLIN: HCPCS | Performed by: PODIATRIST

## 2021-10-11 PROCEDURE — 99213 OFFICE O/P EST LOW 20 MIN: CPT | Performed by: PODIATRIST

## 2021-10-11 PROCEDURE — 11721 DEBRIDE NAIL 6 OR MORE: CPT | Performed by: PODIATRIST

## 2021-10-11 PROCEDURE — 4004F PT TOBACCO SCREEN RCVD TLK: CPT | Performed by: PODIATRIST

## 2021-10-11 PROCEDURE — G8484 FLU IMMUNIZE NO ADMIN: HCPCS | Performed by: PODIATRIST

## 2021-10-11 NOTE — PROGRESS NOTES
[facility] PHYSICIANS  [facility]  [address] [facility] [address] [place] [address]  Dept: [phone]  Dept Fax: [phone]     PAIN PROGRESS NOTE  Date of patient's visit: 10/11/2021  Patient's Name:  Yady German YOB: 1990            Patient Care Team:  Laura Chakraborty MD as PCP - General (Family Medicine)  Laura Chakraborty MD as PCP - [address] [place] [address] [facility]  Beckie Nguyen DPM as Physician (Podiatry)  Yfn Don DPM as Physician (Podiatry)      Chief Complaint   Patient presents with    Foot Pain    Benign Neoplasm    Nail Problem       Subjective: This Yady German comes to clinic for foot and nail care. Pt currently has complaint of thickened, painful, elongated nails that he/she cannot manage by themselves. Pt. Relates pain to nails with shoe gear. Pt's primary care physician is Narinder Stewart MD last seen 12/11/2020. Pt has a new complaint of increased painful skin lesions to toya feet. Pt has tried changing shoes but it has not helped the pain    Past Medical History:   Diagnosis Date    Anxiety     B[facility] 1 [facility] ([facility] [address]     Diarrhea     Resolved (Written 05/14/2019)    Eczema     Hypertension     Lazy eye, right     Poor historian        Allergies   Allergen Reactions    Seasonal      Current Outpatient Medications on File Prior to Visit   Medication Sig Dispense Refill    vitamin D (ERGOCALCIFEROL) 1.25 MG (60530 UT) CAPS capsule       ibuprofen (ADVIL;MOTRIN) 800 MG tablet Take 1 tablet by mouth every 8 hours as needed for Pain 30 tablet 0     No current facility-administered medications on file prior to visit. Review of Systems. Review of Systems:   History obtained from chart review and the patient  General ROS: negative for - chills, fatigue, fever, night sweats or weight gain  Constitutional: Negative for chills, diaphoresis, fatigue, fever and unexpected weight change.   Musculoskeletal: Positive for arthralgias, gait Left    Thickness  [x] [x] [x] [x] [x] [x] [x] [x] [x] [x]  5 4 3 2 1 1 2 3 4 5                         Right                                        Left    Dystrophic Changes   [x] [x] [x] [x] [x] [x] [x] [x] [x] [x]  5 4 3 2 1 1 2 3 4 5                         Right                                        Left    Color  [x] [x] [x] [x] [x] [x] [x] [x] [x] [x]  5 4 3 2 1 1 2 3 4 5                          Right                                        Left    Incurvation/Ingrowin   [] [] [] [] [] [] [] [] [] []  5 4 3 2 1 1 2 3 4 5                         Right                                        Left    Inflammation/Pain   [x] [x] [x] [x] [x] [x] [x] [x] [x] [x]  5 4 3  2 1 1 2 3 4 5                         Right                                        Left       Nails are painful 1-10 with direct palpation. Q7   []Yes  []No                Q8   [x]Yes  []No                     Q9   []Yes    []No  Assessment:  33 y.o. male with:    Diagnosis Orders   1. Bilateral foot pain  40146 - [place] DESTRUCTION BENIGN LESIONS UP TO 14    95668 - [place] DEBRIDEMENT OF NAILS, 6 OR MORE   2. Benign neoplasm of skin of right foot  36334 - [place] DESTRUCTION BENIGN LESIONS UP TO 14   3. Benign neoplasm of skin of lower limb, including hip, left  44910 - [place] DESTRUCTION BENIGN LESIONS UP TO 14   4. Dermatophytosis of nail  85815 - [place] DEBRIDEMENT OF NAILS, 6 OR MORE   5. Difficulty walking  19944 - [place] DESTRUCTION BENIGN LESIONS UP TO 14    69021 - [place] DEBRIDEMENT OF NAILS, 6 OR MORE         Plan:   Pt was evaluated and examined. Patient was given personalized discharge instructions. The lesions were partially excised via 15 blade and silver nitrate was applied under occlusion. The patient tolerated the procedure well and without complication. Advised patient to use vasoline to the area after tomorrow to prevent surrounding tissue irritation.      Nails 1-10 were debrided in length and thickness sharply with a nail nipper and  without incident. Pt will follow up in 9 weeks or sooner if any problems arise. Diagnosis was discussed with the pt and all of their questions were answered in detail. Proper foot hygiene and care was discussed with the pt. Patient to check feet daily and contact the office with any questions/problems/concerns. Other comorbidity noted and will be managed by PCP. Pain waiver discussed with patient and confirmed.    10/11/2021      Electronically signed by Abbie Coello DPM on 10/11/2021 at 1:29 PM  10/11/2021

## 2021-11-01 ENCOUNTER — OFFICE VISIT (OUTPATIENT)
Dept: PODIATRY | Age: 31
End: 2021-11-01
Payer: MEDICARE

## 2021-11-01 VITALS — WEIGHT: 188 LBS | RESPIRATION RATE: 16 BRPM | HEIGHT: 68 IN | BODY MASS INDEX: 28.49 KG/M2

## 2021-11-01 DIAGNOSIS — M79.671 BILATERAL FOOT PAIN: Primary | ICD-10-CM

## 2021-11-01 DIAGNOSIS — M79.672 BILATERAL FOOT PAIN: Primary | ICD-10-CM

## 2021-11-01 DIAGNOSIS — D23.71 BENIGN NEOPLASM OF SKIN OF RIGHT FOOT: ICD-10-CM

## 2021-11-01 DIAGNOSIS — D23.72 BENIGN NEOPLASM OF SKIN OF LOWER LIMB, INCLUDING HIP, LEFT: ICD-10-CM

## 2021-11-01 PROCEDURE — 17110 DESTRUCTION B9 LES UP TO 14: CPT | Performed by: PODIATRIST

## 2021-11-01 PROCEDURE — G8484 FLU IMMUNIZE NO ADMIN: HCPCS | Performed by: PODIATRIST

## 2021-11-01 PROCEDURE — 99213 OFFICE O/P EST LOW 20 MIN: CPT | Performed by: PODIATRIST

## 2021-11-01 PROCEDURE — G8427 DOCREV CUR MEDS BY ELIG CLIN: HCPCS | Performed by: PODIATRIST

## 2021-11-01 PROCEDURE — 4004F PT TOBACCO SCREEN RCVD TLK: CPT | Performed by: PODIATRIST

## 2021-11-01 PROCEDURE — G8419 CALC BMI OUT NRM PARAM NOF/U: HCPCS | Performed by: PODIATRIST

## 2021-11-01 NOTE — PROGRESS NOTES
[facility] PHYSICIANS  [facility]  [address] [place] [address]  Dept: [phone]  Dept Fax: [phone]     FOOT PAIN & BENIGN NEOPLASM PROGRESS NOTE  Date of patient's visit: 11/1/2021  Patient's Name:  Aisha Engel YOB: 1990            Patient Care Team:  Margi Gaitan MD as PCP - General (Family Medicine)  Margi Gaitan MD as PCP - [facility] Empaneled Provider  Eduardo Thao DPM as Physician (Podiatry)  Gary Lee DPM as Physician (Podiatry)          Chief Complaint   Patient presents with    Benign Neoplasm    Foot Pain       Subjective: This Aisha Engel comes to clinic for foot and nail care. Pt currently has complaint of thickened, painful, elongated nails that he/she cannot manage by themselves. Pt. Relates pain to nails with shoe gear. Pt's primary care physician is Roland Stiles [address] [place] [address] seen 12/11/2020. Past Medical History:   Diagnosis Date    Anxiety     Bipolar 1 disorder ([address]     Diarrhea     Resolved (Written 05/14/2019)    Eczema     Hypertension     Lazy eye, right     Poor historian      Pt has a new complaint of increased painful skin lesion to toya feet. Pt has tried changing shoes but it has not helped the pain        Allergies   Allergen Reactions    Seasonal      Current Outpatient Medications on File Prior to Visit   Medication Sig Dispense Refill    vitamin D (ERGOCALCIFEROL) 1.25 MG (31526 UT) CAPS capsule       ibuprofen (ADVIL;MOTRIN) 800 MG tablet Take 1 tablet by mouth every 8 hours as needed for Pain 30 tablet 0     No current facility-administered medications on file prior to visit. Review of Systems    Review of Systems:   History obtained from chart review and the patient  General ROS: negative for - chills, fatigue, fever, night sweats or weight gain  Constitutional: Negative for chills, diaphoresis, fatigue, fever and unexpected weight change.   Musculoskeletal: Positive for arthralgias, gait problem and joint swelling. Neurological ROS: negative for - behavioral changes, confusion, headaches or seizures. Negative for weakness and numbness. Dermatological ROS: negative for - mole changes, rash  Cardiovascular: Negative for leg swelling. Gastrointestinal: Negative for constipation, diarrhea, nausea and vomiting. Objective:  Dermatologic Exam:  Soft tissue lesion to the plantar right and left foot x 4 with central core and petechiae. Pain on palpation of lesion. Skin No rashes or nodules noted. .   Skin is thin, with flaky sloughing skin as well as decreased hair growth to the lower leg  Small red hemosiderin deposits seen dorsal foot   Musculoskeletal:     1st MPJ ROM decreased, Bilateral.  Muscle strength 5/5, Bilateral.  Pain present upon palpation of toenails 1-5, Bilateral. decreased medial longitudinal arch, Bilateral.  Ankle ROM decreased,Bilateral.    Dorsally contracted digits present digits 2, Bilateral.     Vascular: DP pulses 1/4 bilateral.  PT pulses 0/4 bilateral.   CFT <5 seconds, Bilateral.  Hair growth absent to the level of the digits, Bilateral.  Edema present, Bilateral.  Varicosities absent, Bilateral. Erythema absent, Bilateral    Neurological: Sensation diminshed to light touch to level of digits, Bilateral.  Protective sensation intact 6/10 sites via 5.07/10g [place]-Spike Monofilament, Bilateral.  negative Tinel's, Bilateral.  negative Valleix sign, Bilateral.      Integument: Warm, dry, supple, Bilateral.  Open lesion absent, Bilateral.  Interdigital maceration absent to web spaces 4, Bilateral.  Nails 1-5 left and 1-5 right thickened > 3.0 mm, dystrophic and crumbly, discolored with subungual debris. Fissures absent, Bilateral.   General: AAO x 3 in NAD.     Derm  Toenail Description  Sites of Onychomycosis Involvement (Check affected area)  [x] [x] [x] [x] [x] [x] [x] [x] [x] [x]  5 4 3 2 1 1 2 3 4 5                          Right Left    Thickness  [x] [x] [x] [x] [x] [x] [x] [x] [x] [x]  5 4 3 2 1 1 2 3 4 5                         Right                                        Left    Dystrophic Changes   [x] [x] [x] [x] [x] [x] [x] [x] [x] [x]  5 4 3 2 1 1 2 3 4 5                         Right                                        Left    Color  [x] [x] [x] [x] [x] [x] [x] [x] [x] [x]  5 4 3 2 1 1 2 3 4 5                          Right                                        Left    Incurvation/Ingrowin   [] [] [] [] [] [] [] [] [] []  5 4 3 2 1 1 2 3 4 5                         Right                                        Left    Inflammation/Pain   [x] [x] [x] [x] [x] [x] [x] [x] [x] [x]  5 4 3  2 1 1 2 3 4 5                         Right                                        Left       Nails are painful 1-10 with direct palpation. Q7   []Yes  []No                Q8   [x]Yes  []No                     Q9   []Yes    []No    Assessment:  33 y.o. male with:    Diagnosis Orders   1. Bilateral foot pain  66894 - [place] DESTRUCTION BENIGN LESIONS UP TO 14   2. Benign neoplasm of skin of right foot  26544 - [place] DESTRUCTION BENIGN LESIONS UP TO 14   3. Benign neoplasm of skin of lower limb, including hip, left  01092 - [place] DESTRUCTION BENIGN LESIONS UP TO 14         Plan:   Pt was evaluated and examined. Patient was given personalized discharge instructions. Diagnosis was discussed with the pt and all of their questions were answered in detail. Proper foot hygiene and care was discussed with the pt. Patient to check feet daily and contact the office with any questions/problems/concerns. Other comorbidity noted and will be managed by PCP. Pain waiver discussed with patient and confirmed. The lesion was partially debrided and silver nitrate was applied with an apperature pad under occlusion. The patient will leave in place for 24-48 hours and than remove. The patient tolerated the procedure well and without complication.    Pt will follow up in 9 weeks         Electronically signed by Karli Matthews DPM on 11/1/2021 at 8:30 AM  11/1/2021

## 2021-11-17 ENCOUNTER — TELEPHONE (OUTPATIENT)
Dept: GASTROENTEROLOGY | Age: 31
End: 2021-11-17

## 2021-11-17 ENCOUNTER — OFFICE VISIT (OUTPATIENT)
Dept: FAMILY MEDICINE CLINIC | Age: 31
End: 2021-11-17

## 2021-11-17 VITALS
DIASTOLIC BLOOD PRESSURE: 80 MMHG | BODY MASS INDEX: 28.52 KG/M2 | HEIGHT: 68 IN | HEART RATE: 68 BPM | WEIGHT: 188.2 LBS | TEMPERATURE: 96.8 F | OXYGEN SATURATION: 99 % | SYSTOLIC BLOOD PRESSURE: 123 MMHG

## 2021-11-17 DIAGNOSIS — E55.9 VITAMIN D DEFICIENCY: ICD-10-CM

## 2021-11-17 DIAGNOSIS — F41.1 GAD (GENERALIZED ANXIETY DISORDER): ICD-10-CM

## 2021-11-17 DIAGNOSIS — I10 HYPERTENSION, UNSPECIFIED TYPE: Primary | ICD-10-CM

## 2021-11-17 DIAGNOSIS — E66.3 OVERWEIGHT (BMI 25.0-29.9): ICD-10-CM

## 2021-11-17 DIAGNOSIS — F31.9 BIPOLAR 1 DISORDER (HCC): ICD-10-CM

## 2021-11-17 DIAGNOSIS — R10.11 RIGHT UPPER QUADRANT ABDOMINAL PAIN: ICD-10-CM

## 2021-11-17 PROBLEM — M79.672 PAIN IN BOTH FEET: Status: RESOLVED | Noted: 2019-08-06 | Resolved: 2021-11-17

## 2021-11-17 PROBLEM — M79.671 PAIN IN BOTH FEET: Status: RESOLVED | Noted: 2019-08-06 | Resolved: 2021-11-17

## 2021-11-17 PROCEDURE — 99999 PR OFFICE/OUTPT VISIT,PROCEDURE ONLY: CPT | Performed by: FAMILY MEDICINE

## 2021-11-17 SDOH — ECONOMIC STABILITY: FOOD INSECURITY: WITHIN THE PAST 12 MONTHS, YOU WORRIED THAT YOUR FOOD WOULD RUN OUT BEFORE YOU GOT MONEY TO BUY MORE.: NEVER TRUE

## 2021-11-17 SDOH — ECONOMIC STABILITY: FOOD INSECURITY: WITHIN THE PAST 12 MONTHS, THE FOOD YOU BOUGHT JUST DIDN'T LAST AND YOU DIDN'T HAVE MONEY TO GET MORE.: NEVER TRUE

## 2021-11-17 ASSESSMENT — SOCIAL DETERMINANTS OF HEALTH (SDOH): HOW HARD IS IT FOR YOU TO PAY FOR THE VERY BASICS LIKE FOOD, HOUSING, MEDICAL CARE, AND HEATING?: NOT HARD AT ALL

## 2021-11-17 NOTE — TELEPHONE ENCOUNTER
Pt's wife called, and asked if her  needs a referral to make a NP appt. I pulled up the pt's chart, and let the wife know that her  does not have an updated communication form on file, and I cannot discuss anything further with her. Wife put pt on the phone. Pt states he thinks it's his gallbladder. I let the pt know that we don't handle gallbladder issues. Pt states he has an appt with his PCP today. I advised the pt to go to his appt first, and see what his PCP thinks it is. I told the pt that if his PCP thought it was GI issues to call us back, and we'll make the appt. I told the pt I didn't want to ton an appt if it was not GI related. Pt has been having abd pain.

## 2021-12-01 ENCOUNTER — OFFICE VISIT (OUTPATIENT)
Dept: GASTROENTEROLOGY | Age: 31
End: 2021-12-01
Payer: MEDICARE

## 2021-12-01 VITALS
SYSTOLIC BLOOD PRESSURE: 137 MMHG | WEIGHT: 180 LBS | BODY MASS INDEX: 27.37 KG/M2 | TEMPERATURE: 98.3 F | DIASTOLIC BLOOD PRESSURE: 85 MMHG | HEART RATE: 75 BPM

## 2021-12-01 DIAGNOSIS — R10.11 ABDOMINAL PAIN, RIGHT UPPER QUADRANT: Primary | ICD-10-CM

## 2021-12-01 PROCEDURE — G8419 CALC BMI OUT NRM PARAM NOF/U: HCPCS | Performed by: INTERNAL MEDICINE

## 2021-12-01 PROCEDURE — G8484 FLU IMMUNIZE NO ADMIN: HCPCS | Performed by: INTERNAL MEDICINE

## 2021-12-01 PROCEDURE — G8427 DOCREV CUR MEDS BY ELIG CLIN: HCPCS | Performed by: INTERNAL MEDICINE

## 2021-12-01 PROCEDURE — 99214 OFFICE O/P EST MOD 30 MIN: CPT | Performed by: INTERNAL MEDICINE

## 2021-12-01 PROCEDURE — 4004F PT TOBACCO SCREEN RCVD TLK: CPT | Performed by: INTERNAL MEDICINE

## 2021-12-01 ASSESSMENT — ENCOUNTER SYMPTOMS
ABDOMINAL DISTENTION: 0
RECTAL PAIN: 0
VOMITING: 0
BLOOD IN STOOL: 0
DIARRHEA: 0
WHEEZING: 0
ANAL BLEEDING: 0
COUGH: 0
ABDOMINAL PAIN: 1
TROUBLE SWALLOWING: 1
NAUSEA: 1
CHOKING: 1
CONSTIPATION: 0

## 2021-12-01 NOTE — PROGRESS NOTES
Reason for Referral:   No referring provider defined for this encounter. Chief Complaint   Patient presents with    Abdominal Pain     Patient anastasia, referred for abd pain. He states it has been hurting for about 1 yr. He states the pain is under his right rib.  he believesit is from his gallbladder. He has not had any testing done yet. HISTORY OF PRESENT ILLNESS: Cam Obregon is a 33 y.o. male , referred for evaluation of* abd pain    here for the first time    abd pain started when he got out of long-term 2012 he said, patient he was not eating normal diet and present once he started eating normal diet he started having problems and its been going on for years as mentioned since 2012     almost daily , comes and goes multiple ER visit they just give him GI cocktail and do not do any studies   no f/c    no bleeding   No GERD   No weight loss no bleeding a review system with him otherwise unremarkable     past Medical,Family, and Social History reviewed and does contribute to the patient presentingcondition. Patient's PMH/PSH,SH,PSYCH Hx, MEDs, ALLERGIES, and ROS were all reviewed and updated in the appropriate sections.     PAST MEDICAL HISTORY:  Past Medical History:   Diagnosis Date    Anxiety     Bipolar 1 disorder ([facility] [address]     Diarrhea     Resolved (Written 05/14/2019)    Eczema     Hypertension     Lazy eye, right     Pain in both feet 8/6/2019    Pain in left foot     Poor historian        Past Surgical History:   Procedure Laterality Date    ANTERIOR CRUCIATE LIGAMENT REPAIR Left 11/20/2020    LEFT KNEE ACL RECONSTRUCTION ARTHROSCOPIC WITH PARTIAL LATERAL MENISECTOMY AND MENISCAL MEDIAL ROOT REPAIR performed by Jennifer Carpio DO at [address] Right 12/1/2017    FOOT BUNIONECTOMY OLIVER / HAMMERTOE REPAIR 2ND TOE & MPJ RELEASE 2ND METATARSAL performed by Michelle Julien DPM at [address] Bilateral 5/16/2019    LEFT FOOT LAPIDUS BUNIONECTOMY AND 2ND METATARSAL 2ND DIGIT ARTHROPLASTY, RIGHT 2ND METATARSAL CONDYLECTOMY performed by Lola Masterson DPM at [place] [address] Right     removed part of foreign body    FOOT SURGERY Bilateral 05/16/2019     LEFT FOOT LAPIDUS BUNIONECTOMY AND 2ND METATARSAL 2ND DIGIT ARTHROPLASTY, RIGHT 2ND METATARSAL CONDYLECTOMY (Bilateral )    FRACTURE SURGERY Right 5/31/2019    RIGHT  HAND CRPP, RIGHT 3RD, 4TH, 5TH METOCARPAL DISLOCATION performed by Analisa Mars DO at [address] [place] [address] HAND SURGERY Right 05/31/2019    RIGHT  HAND CRPP, RIGHT 3RD, 4TH, 5TH        CURRENT MEDICATIONS:  No current outpatient medications on file. ALLERGIES:   Allergies   Allergen Reactions    Seasonal        FAMILY HISTORY:       Adopted: Yes   Family history unknown: Yes         SOCIAL HISTORY:   Social History     Socioeconomic History    Marital status:      Spouse name: Not on file    Number of children: Not on file    Years of education: Not on file    Highest education level: Not on file   Occupational History    Not on file   Tobacco Use    Smoking status: Current Every Day Smoker     Packs/day: 0.25     Years: 4.00     Pack years: 1.00     Types: Cigarettes     Start date: 11/17/2012    Smokeless tobacco: Never Used    Tobacco comment: 3-4 cig. a day   Vaping Use    Vaping Use: Never used   Substance and Sexual Activity    Alcohol use: Yes     Comment: Rare    Drug use: Not Currently     Frequency: 7.0 times per week     Types: Marijuana Agto [place])    Sexual activity: Not on file   Other Topics Concern    Not on file   Social History Narrative    Not on file     Social Determinants of Health     Financial Resource Strain: Low Risk     Difficulty of Paying Living Expenses: Not hard at all   Food Insecurity: No Food Insecurity    Worried About Running Out of Food in the Last Year: Never true    Arnoldo of Food in the Last Year: Never true   Transportation Needs:     Lack of Transportation (Medical):  Not on file    Lack of Transportation (Non-Medical): Not on file   Physical Activity:     Days of Exercise per Week: Not on file    Minutes of Exercise per Session: Not on file   Stress:     Feeling of Stress : Not on file   Social Connections:     Frequency of Communication with Friends and Family: Not on file    Frequency of Social Gatherings with Friends and Family: Not on file    Attends Congregation Services: Not on file    Active Member of [address] [place] [address] or Organizations: Not on file    Attends Club or Organization Meetings: Not on file    Marital Status: Not on file   Intimate Partner Violence:     Fear of Current or Ex-Partner: Not on file    Emotionally Abused: Not on file    Physically Abused: Not on file    Sexually Abused: Not on file   Housing Stability:     Unable to Pay for Housing in the Last Year: Not on file    Number of [place] in the Last Year: Not on file    Unstable Housing in the Last Year: Not on file       REVIEW OF SYSTEMS: A 12-point review of systemswas obtained and pertinent positives and negatives were enumerated above in the history of present illness. All other reviewed systems / symptoms were negative. Review of Systems   Constitutional: Negative for appetite change, fatigue and unexpected weight change. HENT: Positive for trouble swallowing (occasional). Respiratory: Positive for choking. Negative for cough and wheezing. Cardiovascular: Negative for chest pain, palpitations and leg swelling. Gastrointestinal: Positive for abdominal pain and nausea. Negative for abdominal distention, anal bleeding, blood in stool, constipation, diarrhea, rectal pain and vomiting. Genitourinary: Negative for difficulty urinating. Allergic/Immunologic: Negative for environmental allergies and food allergies. Neurological: Negative for dizziness, weakness, light-headedness, numbness and headaches. Hematological: Does not bruise/bleed easily.    Psychiatric/Behavioral: Negative for sleep disturbance. The patient is not nervous/anxious. LABORATORY DATA: Reviewed  Lab Results   Component Value Date    WBC 5.6 05/14/2019    HGB 14.6 05/14/2019    HCT 42.9 05/14/2019    MCV 95.3 05/14/2019     05/14/2019     01/24/2019    K 4.2 01/24/2019     (H) 01/24/2019    CO2 20 01/24/2019    BUN 16 01/24/2019    CREATININE 0.94 01/24/2019    LABALBU 4.5 01/24/2019    BILITOT 0.38 01/24/2019    ALKPHOS 93 01/24/2019    AST 24 01/24/2019    ALT 24 01/24/2019         Lab Results   Component Value Date    RBC 4.50 05/14/2019    HGB 14.6 05/14/2019    MCV 95.3 05/14/2019    MCH 32.4 05/14/2019    MCHC 34.0 05/14/2019    RDW 13.0 05/14/2019    MPV 11.6 05/14/2019    BASOPCT 0 05/14/2019    LYMPHSABS 1.84 05/14/2019    MONOSABS 0.55 05/14/2019    NEUTROABS 2.93 05/14/2019    EOSABS 0.20 05/14/2019    BASOSABS <0.03 05/14/2019         DIAGNOSTIC TESTING:     No results found. /85   Pulse 75   Temp 98.3 °F (36.8 °C)   Wt 180 lb (81.6 kg)   BMI 27.37 kg/m²     PHYSICAL EXAMINATION: Vital signs reviewed per the nursing documentation. Body mass index is 27.37 kg/m². Physical Exam  Vitals and nursing note reviewed. Constitutional:       General: He is not in acute distress. Appearance: He is well-developed. He is not diaphoretic. HENT:      Head: Normocephalic and atraumatic. Eyes:      General: No scleral icterus. Pupils: Pupils are equal, round, and reactive to light. Neck:      Thyroid: No thyromegaly. Vascular: No JVD. Trachea: No tracheal deviation. Cardiovascular:      Rate and Rhythm: Normal rate and regular rhythm. Heart sounds: Normal heart sounds. No murmur heard. Pulmonary:      Effort: Pulmonary effort is normal. No respiratory distress. Breath sounds: Normal breath sounds. No wheezing. Abdominal:      General: Bowel sounds are normal. There is no distension. Palpations: Abdomen is soft. There is no mass. Tenderness: There is no abdominal tenderness. There is no guarding or rebound. Musculoskeletal:         General: No tenderness. Normal range of motion. Cervical back: Normal range of motion and neck supple. Skin:     General: Skin is warm. Coloration: Skin is not pale. Findings: No erythema or rash. Comments: He is not diaphoretic   Neurological:      Mental Status: He is alert and oriented to person, place, and time. Deep Tendon Reflexes: Reflexes are normal and symmetric. Psychiatric:         Behavior: Behavior normal.         Thought Content: Thought content normal.         Judgment: Judgment normal.         IMPRESSION: Mr. Saint Clair is a 33 y.o. male with      Diagnosis Orders   1. Abdominal pain, right upper quadrant  Hepatic Function Panel    CBC Auto Differential    Lipase    EGD    CT ABDOMEN PELVIS W IV CONTRAST     Proceed with the above and take it from there      Diet/life style/natural hx /complication of the dx were all explained in details   Past medical, past surgical, social history, psychiatric history, medications or allergies, all reviewed and  updated        Thank you for allowing me to participate in the care of Mr. Saint Clair. For any further questions please do not hesitate to contact me. I have reviewed and agree with the MA/RN ROS. Note is dictated utilizing voice recognition software. Unfortunately this leads to occasional typographical errors. Please contact our office if you have any questions.     Mar Benton MD  [facility]  O: #[phone]

## 2021-12-03 NOTE — TELEPHONE ENCOUNTER
Spoke with . [facility] in regards to his COVID test and PAT PC. Pt was notified that he is scheduled at [facility] on 12/5 @11 for his COVID test and was notified the hospital will be calling him on 12/6 @ 12:15PM. Pt states he verbally understands.

## 2021-12-06 ENCOUNTER — HOSPITAL ENCOUNTER (OUTPATIENT)
Dept: PREADMISSION TESTING | Age: 31
Discharge: HOME | End: 2021-12-10
Payer: MEDICARE

## 2021-12-06 VITALS — WEIGHT: 181 LBS | BODY MASS INDEX: 27.43 KG/M2 | HEIGHT: 68 IN

## 2021-12-06 NOTE — TELEPHONE ENCOUNTER
Writer spoke to pt over the phone in regards to missing covid test yesterday. Pt states he didn't have his car yesterday and was wondering if he could do covid test today 12/6/21. Writer spoke to [facility] surgery and they state anesthesia does not allow covid tests after four days in case the results don't come back in time. Writer asked  for a rapid test the day of proc and she sched pt for a rapid covid test the day of the proc. Writer informed pt his covid test will be done the day of proc. Writer also informed pt we had to change arrival & proc time d/t cancellations in the sched. Pt is asked to arrive at 7:15am and 9:15am proc time. Pt states he is able to arrive at that time.

## 2021-12-06 NOTE — PROGRESS NOTES

## 2021-12-06 NOTE — PROGRESS NOTES
Patient relates that he is going to have his lab work ordered by Dr. Gerardo Elena in [address] drawn today.

## 2021-12-07 ENCOUNTER — HOSPITAL ENCOUNTER (OUTPATIENT)
Age: 31
Discharge: HOME | End: 2021-12-07
Payer: MEDICARE

## 2021-12-07 DIAGNOSIS — R10.11 ABDOMINAL PAIN, RIGHT UPPER QUADRANT: ICD-10-CM

## 2021-12-07 LAB
ABSOLUTE EOS #: 0.2 K/UL (ref 0–0.44)
ABSOLUTE IMMATURE GRANULOCYTE: <0.03 K/UL (ref 0–0.3)
ABSOLUTE LYMPH #: 1.65 K/UL (ref 1.1–3.7)
ABSOLUTE MONO #: 0.6 K/UL (ref 0.1–1.2)
ALBUMIN SERPL-MCNC: 4.2 G/DL (ref 3.5–5.2)
ALBUMIN/GLOBULIN RATIO: 1.9 (ref 1–2.5)
ALP BLD-CCNC: 105 U/L (ref 40–129)
ALT SERPL-CCNC: 13 U/L (ref 5–41)
AST SERPL-CCNC: 17 U/L
BASOPHILS # BLD: 0 % (ref 0–2)
BASOPHILS ABSOLUTE: <0.03 K/UL (ref 0–0.2)
BILIRUB SERPL-MCNC: 0.24 MG/DL (ref 0.3–1.2)
BILIRUBIN DIRECT: 0.1 MG/DL
BILIRUBIN, INDIRECT: 0.14 MG/DL (ref 0–1)
DIFFERENTIAL TYPE: NORMAL
EOSINOPHILS RELATIVE PERCENT: 4 % (ref 1–4)
GLOBULIN: ABNORMAL G/DL (ref 1.5–3.8)
HCT VFR BLD CALC: 46.8 % (ref 40.7–50.3)
HEMOGLOBIN: 15.4 G/DL (ref 13–17)
IMMATURE GRANULOCYTES: 0 %
LIPASE: 58 U/L (ref 13–60)
LYMPHOCYTES # BLD: 30 % (ref 24–43)
MCH RBC QN AUTO: 32.8 PG (ref 25.2–33.5)
MCHC RBC AUTO-ENTMCNC: 32.9 G/DL (ref 28.4–34.8)
MCV RBC AUTO: 99.8 FL (ref 82.6–102.9)
MONOCYTES # BLD: 11 % (ref 3–12)
NRBC AUTOMATED: 0 PER 100 WBC
PDW BLD-RTO: 14.2 % (ref 11.8–14.4)
PLATELET # BLD: 240 K/UL (ref 138–453)
PLATELET ESTIMATE: NORMAL
PMV BLD AUTO: 12.5 FL (ref 8.1–13.5)
RBC # BLD: 4.69 M/UL (ref 4.21–5.77)
RBC # BLD: NORMAL 10*6/UL
SEG NEUTROPHILS: 55 % (ref 36–65)
SEGMENTED NEUTROPHILS ABSOLUTE COUNT: 3.03 K/UL (ref 1.5–8.1)
TOTAL PROTEIN: 6.4 G/DL (ref 6.4–8.3)
WBC # BLD: 5.5 K/UL (ref 3.5–11.3)
WBC # BLD: NORMAL 10*3/UL

## 2021-12-07 PROCEDURE — 36415 COLL VENOUS BLD VENIPUNCTURE: CPT

## 2021-12-07 PROCEDURE — 85025 COMPLETE CBC W/AUTO DIFF WBC: CPT

## 2021-12-07 PROCEDURE — 83690 ASSAY OF LIPASE: CPT

## 2021-12-07 PROCEDURE — 80076 HEPATIC FUNCTION PANEL: CPT

## 2021-12-08 ENCOUNTER — ANESTHESIA EVENT (OUTPATIENT)
Dept: ENDOSCOPY | Age: 31
End: 2021-12-08
Payer: MEDICARE

## 2021-12-09 ENCOUNTER — ANESTHESIA (OUTPATIENT)
Dept: ENDOSCOPY | Age: 31
End: 2021-12-09
Payer: MEDICARE

## 2021-12-09 ENCOUNTER — HOSPITAL ENCOUNTER (OUTPATIENT)
Age: 31
Setting detail: OUTPATIENT SURGERY
Discharge: HOME | End: 2021-12-09
Attending: INTERNAL MEDICINE | Admitting: INTERNAL MEDICINE
Payer: MEDICARE

## 2021-12-09 VITALS
HEART RATE: 53 BPM | DIASTOLIC BLOOD PRESSURE: 83 MMHG | WEIGHT: 181 LBS | HEIGHT: 68 IN | SYSTOLIC BLOOD PRESSURE: 132 MMHG | RESPIRATION RATE: 22 BRPM | TEMPERATURE: 98.4 F | BODY MASS INDEX: 27.43 KG/M2 | OXYGEN SATURATION: 98 %

## 2021-12-09 VITALS — SYSTOLIC BLOOD PRESSURE: 110 MMHG | DIASTOLIC BLOOD PRESSURE: 59 MMHG | OXYGEN SATURATION: 99 %

## 2021-12-09 LAB
SARS-COV-2, RAPID: NOT DETECTED
SPECIMEN DESCRIPTION: NORMAL

## 2021-12-09 PROCEDURE — 88305 TISSUE EXAM BY PATHOLOGIST: CPT

## 2021-12-09 PROCEDURE — 2709999900 HC NON-CHARGEABLE SUPPLY: Performed by: INTERNAL MEDICINE

## 2021-12-09 PROCEDURE — 6360000002 HC RX W HCPCS: Performed by: NURSE ANESTHETIST, CERTIFIED REGISTERED

## 2021-12-09 PROCEDURE — 43239 EGD BIOPSY SINGLE/MULTIPLE: CPT | Performed by: INTERNAL MEDICINE

## 2021-12-09 PROCEDURE — 87635 SARS-COV-2 COVID-19 AMP PRB: CPT

## 2021-12-09 PROCEDURE — 3700000000 HC ANESTHESIA ATTENDED CARE: Performed by: INTERNAL MEDICINE

## 2021-12-09 PROCEDURE — 7100000001 HC PACU RECOVERY - ADDTL 15 MIN: Performed by: INTERNAL MEDICINE

## 2021-12-09 PROCEDURE — 3700000001 HC ADD 15 MINUTES (ANESTHESIA): Performed by: INTERNAL MEDICINE

## 2021-12-09 PROCEDURE — 2500000003 HC RX 250 WO HCPCS: Performed by: NURSE ANESTHETIST, CERTIFIED REGISTERED

## 2021-12-09 PROCEDURE — 3609012400 HC EGD TRANSORAL BIOPSY SINGLE/MULTIPLE: Performed by: INTERNAL MEDICINE

## 2021-12-09 PROCEDURE — 7100000000 HC PACU RECOVERY - FIRST 15 MIN: Performed by: INTERNAL MEDICINE

## 2021-12-09 PROCEDURE — 2580000003 HC RX 258: Performed by: ANESTHESIOLOGY

## 2021-12-09 RX ORDER — LIDOCAINE HYDROCHLORIDE 20 MG/ML
INJECTION, SOLUTION EPIDURAL; INFILTRATION; INTRACAUDAL; PERINEURAL PRN
Status: DISCONTINUED | OUTPATIENT
Start: 2021-12-09 | End: 2021-12-09 | Stop reason: SDUPTHER

## 2021-12-09 RX ORDER — OXYCODONE HYDROCHLORIDE AND ACETAMINOPHEN 5; 325 MG/1; MG/1
2 TABLET ORAL PRN
Status: DISCONTINUED | OUTPATIENT
Start: 2021-12-09 | End: 2021-12-09 | Stop reason: HOSPADM

## 2021-12-09 RX ORDER — LIDOCAINE HYDROCHLORIDE 10 MG/ML
1 INJECTION, SOLUTION EPIDURAL; INFILTRATION; INTRACAUDAL; PERINEURAL
Status: DISCONTINUED | OUTPATIENT
Start: 2021-12-09 | End: 2021-12-09 | Stop reason: HOSPADM

## 2021-12-09 RX ORDER — PROPOFOL 10 MG/ML
INJECTION, EMULSION INTRAVENOUS PRN
Status: DISCONTINUED | OUTPATIENT
Start: 2021-12-09 | End: 2021-12-09 | Stop reason: SDUPTHER

## 2021-12-09 RX ORDER — SODIUM CHLORIDE 9 MG/ML
25 INJECTION, SOLUTION INTRAVENOUS PRN
Status: DISCONTINUED | OUTPATIENT
Start: 2021-12-09 | End: 2021-12-09 | Stop reason: HOSPADM

## 2021-12-09 RX ORDER — OXYCODONE HYDROCHLORIDE AND ACETAMINOPHEN 5; 325 MG/1; MG/1
1 TABLET ORAL PRN
Status: DISCONTINUED | OUTPATIENT
Start: 2021-12-09 | End: 2021-12-09 | Stop reason: HOSPADM

## 2021-12-09 RX ORDER — LABETALOL HYDROCHLORIDE 5 MG/ML
5 INJECTION, SOLUTION INTRAVENOUS EVERY 10 MIN PRN
Status: DISCONTINUED | OUTPATIENT
Start: 2021-12-09 | End: 2021-12-09 | Stop reason: HOSPADM

## 2021-12-09 RX ORDER — SODIUM CHLORIDE, SODIUM LACTATE, POTASSIUM CHLORIDE, CALCIUM CHLORIDE 600; 310; 30; 20 MG/100ML; MG/100ML; MG/100ML; MG/100ML
INJECTION, SOLUTION INTRAVENOUS CONTINUOUS
Status: DISCONTINUED | OUTPATIENT
Start: 2021-12-09 | End: 2021-12-09 | Stop reason: HOSPADM

## 2021-12-09 RX ORDER — SODIUM CHLORIDE 0.9 % (FLUSH) 0.9 %
10 SYRINGE (ML) INJECTION EVERY 12 HOURS SCHEDULED
Status: DISCONTINUED | OUTPATIENT
Start: 2021-12-09 | End: 2021-12-09 | Stop reason: HOSPADM

## 2021-12-09 RX ORDER — ONDANSETRON 2 MG/ML
4 INJECTION INTRAMUSCULAR; INTRAVENOUS
Status: DISCONTINUED | OUTPATIENT
Start: 2021-12-09 | End: 2021-12-09 | Stop reason: HOSPADM

## 2021-12-09 RX ORDER — DIPHENHYDRAMINE HYDROCHLORIDE 50 MG/ML
12.5 INJECTION INTRAMUSCULAR; INTRAVENOUS
Status: DISCONTINUED | OUTPATIENT
Start: 2021-12-09 | End: 2021-12-09 | Stop reason: HOSPADM

## 2021-12-09 RX ORDER — SODIUM CHLORIDE 0.9 % (FLUSH) 0.9 %
10 SYRINGE (ML) INJECTION PRN
Status: DISCONTINUED | OUTPATIENT
Start: 2021-12-09 | End: 2021-12-09 | Stop reason: HOSPADM

## 2021-12-09 RX ADMIN — LIDOCAINE HYDROCHLORIDE 40 MG: 20 INJECTION, SOLUTION EPIDURAL; INFILTRATION; INTRACAUDAL; PERINEURAL at 09:32

## 2021-12-09 RX ADMIN — PROPOFOL 240 MG: 10 INJECTION, EMULSION INTRAVENOUS at 09:32

## 2021-12-09 RX ADMIN — SODIUM CHLORIDE, POTASSIUM CHLORIDE, SODIUM LACTATE AND CALCIUM CHLORIDE: 600; 310; 30; 20 INJECTION, SOLUTION INTRAVENOUS at 08:37

## 2021-12-09 ASSESSMENT — ENCOUNTER SYMPTOMS
TROUBLE SWALLOWING: 0
WHEEZING: 0
SHORTNESS OF BREATH: 0
APNEA: 0
SINUS PAIN: 0
CHEST TIGHTNESS: 0
SORE THROAT: 0
SINUS PRESSURE: 0
COUGH: 0
BACK PAIN: 0
RHINORRHEA: 0

## 2021-12-09 ASSESSMENT — PAIN - FUNCTIONAL ASSESSMENT: PAIN_FUNCTIONAL_ASSESSMENT: 0-10

## 2021-12-09 ASSESSMENT — PULMONARY FUNCTION TESTS
PIF_VALUE: 1
PIF_VALUE: 0
PIF_VALUE: 0
PIF_VALUE: 1
PIF_VALUE: 0
PIF_VALUE: 1
PIF_VALUE: 2
PIF_VALUE: 0
PIF_VALUE: 1
PIF_VALUE: 0
PIF_VALUE: 0
PIF_VALUE: 1
PIF_VALUE: 1

## 2021-12-09 ASSESSMENT — PAIN SCALES - GENERAL: PAINLEVEL_OUTOF10: 0

## 2021-12-09 NOTE — H&P
HISTORY and [id number]       NAME:  Tacos Cavazos  MRN: [id number]   YOB: 1990   Date: 12/9/2021   Age: 33 y.o. Gender: male       COMPLAINT AND PRESENT HISTORY:   Tacos Cavazos  is a 33 y.o. male presenting today for an EGD. Pt reports having RUQ pain over the past few years. Pain is rated on average 6/10 in severity, intermittently, cramping in nature in RUQ non radiating. He reports no aggravating or alleviating factors. He does state he did time in half-way and once released he began eating a higher fat diet and thinks maybe that is the cause. He denies any N/V, no D/C, no bloody or tarry stools. No family hx of colon cancer, no other GI issues. Pt has a PMHX significant for lazy eye    Pt has been npo since midnight. Pt does not wear dentures. Pt denies any hx of MRSA infection  Pt not currently taking any blood thinners or anticoagulants  Pt denies any personal or FHx of complications with anesthesia. Pt denies any acute symptoms of illness at this time including no SOB, CP, fever, URI or UTI symptoms. RECENT IMAGING    No results found.      PAST MEDICAL HISTORY     Past Medical History:   Diagnosis Date    Abdominal pain     right upper quadrant    Anxiety     Bipolar 1 disorder ([facility] [address]     Diarrhea     Resolved (Written 05/14/2019)    Eczema     Hypertension     no medications    Lazy eye, right     Pain in both feet 8/6/2019    Pain in left foot     Poor historian        SURGICAL HISTORY       Past Surgical History:   Procedure Laterality Date    ANTERIOR CRUCIATE LIGAMENT REPAIR Left 11/20/2020    LEFT KNEE ACL RECONSTRUCTION ARTHROSCOPIC WITH PARTIAL LATERAL MENISECTOMY AND MENISCAL MEDIAL ROOT REPAIR performed by Davey Love DO at [address] Right 12/1/2017    FOOT BUNIONECTOMY OLIVER / HAMMERTOE REPAIR 2ND TOE & MPJ RELEASE 2ND METATARSAL performed by Neli Lee DPM at [address] Bilateral 5/16/2019    LEFT FOOT LAPIDUS BUNIONECTOMY AND 2ND METATARSAL 2ND DIGIT ARTHROPLASTY, RIGHT 2ND METATARSAL CONDYLECTOMY performed by Gregorio Fox DPM at [facility] Right     removed part of foreign body    FOOT SURGERY Bilateral 05/16/2019     LEFT FOOT LAPIDUS BUNIONECTOMY AND 2ND METATARSAL 2ND DIGIT ARTHROPLASTY, RIGHT 2ND METATARSAL CONDYLECTOMY (Bilateral )    FRACTURE SURGERY Right 5/31/2019    RIGHT  HAND CRPP, RIGHT 3RD, 4TH, 5TH METOCARPAL DISLOCATION performed by Dell Greco DO at [address] [place] [address] HAND SURGERY Right 05/31/2019    RIGHT  HAND CRPP, RIGHT 3RD, 4TH, 5TH        FAMILY HISTORY       Family History   Adopted: Yes   Family history unknown: Yes       SOCIAL HISTORY       Social History     Socioeconomic History    Marital status:      Spouse name: Not on file    Number of children: Not on file    Years of education: Not on file    Highest education level: Not on file   Occupational History    Not on file   Tobacco Use    Smoking status: Current Every Day Smoker     Packs/day: 0.25     Years: 4.00     Pack years: 1.00     Types: Cigarettes     Start date: 11/17/2012    Smokeless tobacco: Never Used    Tobacco comment: 3-4 cig. a day   Vaping Use    Vaping Use: Never used   Substance and Sexual Activity    Alcohol use: Yes     Comment: Rare    Drug use: Not Currently     Frequency: 7.0 times per week     Types: Marijuana Natalie Polo)    Sexual activity: Not on file   Other Topics Concern    Not on file   Social History Narrative    Not on file     Social Determinants of Health     Financial Resource Strain: Low Risk     Difficulty of Paying Living Expenses: Not hard at all   Food Insecurity: No Food Insecurity    Worried About Running Out of Food in the Last Year: Never true    Arnoldo of Food in the Last Year: Never true   Transportation Needs:     Lack of Transportation (Medical): Not on file    Lack of Transportation (Non-Medical):  Not on file   Physical Activity:     Days of

## 2021-12-09 NOTE — ANESTHESIA POSTPROCEDURE EVALUATION
Department of Anesthesiology  Postprocedure Note    Patient: Kat Pang  MRN: [id number]  YOB: 1990  Date of evaluation: 12/9/2021  Time:  2:20 PM     Procedure Summary     Date: 12/09/21 Room / Location: [place] [address] 03 / [facility]    Anesthesia Start: 4737 Anesthesia Stop: 8742    Procedure: EGD BIOPSY (N/A Esophagus) Diagnosis:       (ABDOMINAL PAIN-RIGHT UPPER QUADRANT)      (**RAPID COVID TEST ON ADMIT**)    Surgeons: Simon Soto MD Responsible Provider: Annie Amado MD    Anesthesia Type: general ASA Status: 2          Anesthesia Type: general    Yudith Phase I: Yudith Score: 10    Yudith Phase II:      Last vitals: Reviewed and per EMR flowsheets.        Anesthesia Post Evaluation    Comments: POST- ANESTHESIA EVALUATION       Pt Name: Kat Pang  MRN: [id number]  YOB: 1990  Date of evaluation: 12/9/2021  Time:  2:20 PM      /83   Pulse 53   Temp 98.4 °F (36.9 °C)   Resp 22   Ht 5' 8\" (1.727 m)   Wt 181 lb (82.1 kg)   SpO2 98%   BMI 27.52 kg/m²      Consciousness Level  Awake  Cardiopulmonary Status  Stable  Pain Adequately Treated YES  Nausea / Vomiting  NO  Adequate Hydration  YES  Anesthesia Related Complications NONE      Electronically signed by Annie Amado MD on 12/9/2021 at 2:20 PM

## 2021-12-09 NOTE — ANESTHESIA PRE PROCEDURE
Department of Anesthesiology  Preprocedure Note       Name:  Wesley Candelaria   Age:  33 y.o.  :  1990                                          MRN:  [id number]         Date:  2021      Surgeon: Khadra Fairchild):  Luz Suarez MD    Procedure: Procedure(s):  EGD    Medications prior to admission:   Prior to Admission medications    Not on File       Current medications:    Current Facility-Administered Medications   Medication Dose Route Frequency Provider Last Rate Last Admin    lactated ringers infusion   IntraVENous Continuous Wilian Bruce MD        sodium chloride flush 0.9 % injection 10 mL  10 mL IntraVENous 2 times per day Wilian Bruce MD        sodium chloride flush 0.9 % injection 10 mL  10 mL IntraVENous PRN Wilian Bruce MD        0.9 % sodium chloride infusion  25 mL IntraVENous PRN Wilian Bruce MD        lidocaine PF 1 % injection 1 mL  1 mL IntraDERmal Once PRN Wilian Bruce MD           Allergies: Allergies   Allergen Reactions    Seasonal        Problem List:    Patient Active Problem List   Diagnosis Code    Bunion, left M21.612    Hammertoe of second toe of right foot M20.41    Metatarsalgia of right foot M77.41    CMC (carpometacarpal joint) dislocation S63.056A    Contracture of joint of left foot M24.575    Hypertrophy of bone, left ankle and foot M89.372    Contracture of joint of right foot M24.574    Lower extremity pain, inferior, right M79.604    Lazy eye, right H53.001    Bilateral pes planus M21.41, M21.42    Callus of foot L84    S/P bilateral foot surgery Z98.890    Bipolar 1 disorder (HCC) F31.9    Vitamin D deficiency E55.9    Hypertension I10    JOANNA (generalized anxiety disorder) F41.1    Overweight (BMI 25.0-29. 9) E66.3    Adjustment disorder F43.20    Right upper quadrant abdominal pain R10.11       Past Medical History:        Diagnosis Date    Abdominal pain     right upper quadrant    Anxiety     Bipolar 1 disorder ([address]     Eczema     Lazy eye, right     Pain in both feet 08/06/2019    Pain in left foot     Poor historian        Past Surgical History:        Procedure Laterality Date    ANTERIOR CRUCIATE LIGAMENT REPAIR Left 11/20/2020    LEFT KNEE ACL RECONSTRUCTION ARTHROSCOPIC WITH PARTIAL LATERAL MENISECTOMY AND MENISCAL MEDIAL ROOT REPAIR performed by Narendra Lyon DO at [address] Right 12/1/2017    FOOT BUNIONECTOMY OLIVER / HAMMERTOE REPAIR 2ND TOE & MPJ RELEASE 2ND METATARSAL performed by Alex Mathews DPM at [address] Bilateral 5/16/2019    LEFT FOOT LAPIDUS BUNIONECTOMY AND 2ND METATARSAL 2ND DIGIT ARTHROPLASTY, RIGHT 2ND METATARSAL CONDYLECTOMY performed by Derian Nobles DPM at [facility] Right     removed part of foreign body    FOOT SURGERY Bilateral 05/16/2019     LEFT FOOT LAPIDUS BUNIONECTOMY AND 2ND METATARSAL 2ND DIGIT ARTHROPLASTY, RIGHT 2ND METATARSAL CONDYLECTOMY (Bilateral )    FRACTURE SURGERY Right 5/31/2019    RIGHT  HAND CRPP, RIGHT 3RD, 4TH, 5TH METOCARPAL DISLOCATION performed by Narendra Lyon DO at [address] [place] [address] HAND SURGERY Right 05/31/2019    RIGHT  HAND CRPP, RIGHT 3RD, 4TH, 5TH        Social History:    Social History     Tobacco Use    Smoking status: Current Every Day Smoker     Packs/day: 0.25     Years: 4.00     Pack years: 1.00     Types: Cigarettes     Start date: 11/17/2012    Smokeless tobacco: Never Used    Tobacco comment: 3-4 cig. a day   Substance Use Topics    Alcohol use: Yes     Comment: Rare                                Ready to quit: Not Answered  Counseling given: Not Answered  Comment: 3-4 cig. a day      Vital Signs (Current): There were no vitals filed for this visit.                                            BP Readings from Last 3 Encounters:   12/01/21 137/85   11/17/21 123/80   01/18/21 135/86       NPO Status:                                                                                 BMI:   Wt Readings from Last 3 Encounters:   12/06/21 181 lb (82.1 kg)   12/01/21 180 lb (81.6 kg)   11/17/21 188 lb 3.2 oz (85.4 kg)     There is no height or weight on file to calculate BMI.    CBC:   Lab Results   Component Value Date    WBC 5.5 12/07/2021    RBC 4.69 12/07/2021    HGB 15.4 12/07/2021    HCT 46.8 12/07/2021    MCV 99.8 12/07/2021    RDW 14.2 12/07/2021     12/07/2021       CMP:   Lab Results   Component Value Date     01/24/2019    K 4.2 01/24/2019     01/24/2019    CO2 20 01/24/2019    BUN 16 01/24/2019    CREATININE 0.94 01/24/2019    GFRAA >60 01/24/2019    LABGLOM >60 01/24/2019    GLUCOSE 93 01/24/2019    PROT 6.4 12/07/2021    CALCIUM 9.2 01/24/2019    BILITOT 0.24 12/07/2021    ALKPHOS 105 12/07/2021    AST 17 12/07/2021    ALT 13 12/07/2021       POC Tests: No results for input(s): POCGLU, POCNA, POCK, POCCL, POCBUN, POCHEMO, POCHCT in the last 72 hours.     Coags: No results found for: PROTIME, INR, APTT    HCG (If Applicable): No results found for: PREGTESTUR, PREGSERUM, HCG, HCGQUANT     ABGs: No results found for: PHART, PO2ART, QEQ9BMY, SLV9PNG, BEART, D1ZNNXDD     Type & Screen (If Applicable):  No results found for: LABABO, LABRH    Drug/Infectious Status (If Applicable):  No results found for: HIV, HEPCAB    COVID-19 Screening (If Applicable):   Lab Results   Component Value Date    COVID19 Not Detected 12/09/2021    COVID19 Not Detected 11/16/2020           Anesthesia Evaluation  Patient summary reviewed and Nursing notes reviewed no history of anesthetic complications:   Airway: Mallampati: II  TM distance: >3 FB   Neck ROM: full  Mouth opening: > = 3 FB Dental: normal exam         Pulmonary:Negative Pulmonary ROS and normal exam  breath sounds clear to auscultation                             Cardiovascular:    (+) hypertension:,         Rhythm: regular  Rate: normal                    Neuro/Psych:   (+) psychiatric history:depression/anxiety             GI/Hepatic/Renal:   (+) GERD:,           Endo/Other: Abdominal:             Vascular: negative vascular ROS. Other Findings:             Anesthesia Plan      general     ASA 2       Induction: intravenous. MIPS: Prophylactic antiemetics administered. Anesthetic plan and risks discussed with patient. Plan discussed with CRNA.                   Christine Albrecht MD   12/9/2021

## 2021-12-10 LAB — SURGICAL PATHOLOGY REPORT: NORMAL

## 2021-12-23 ENCOUNTER — TELEPHONE (OUTPATIENT)
Dept: GASTROENTEROLOGY | Age: 31
End: 2021-12-23

## 2021-12-23 RX ORDER — CLARITHROMYCIN 500 MG/1
500 TABLET, COATED ORAL 2 TIMES DAILY
Qty: 28 TABLET | Refills: 0 | Status: SHIPPED | OUTPATIENT
Start: 2021-12-23 | End: 2022-01-13 | Stop reason: SDUPTHER

## 2021-12-23 RX ORDER — OMEPRAZOLE 20 MG/1
20 CAPSULE, DELAYED RELEASE ORAL 2 TIMES DAILY
Qty: 28 CAPSULE | Refills: 0 | Status: SHIPPED | OUTPATIENT
Start: 2021-12-23 | End: 2022-01-13 | Stop reason: SDUPTHER

## 2021-12-23 RX ORDER — AMOXICILLIN 500 MG/1
1000 TABLET, FILM COATED ORAL 2 TIMES DAILY
Qty: 56 TABLET | Refills: 0 | Status: SHIPPED | OUTPATIENT
Start: 2021-12-23 | End: 2022-01-13 | Stop reason: SDUPTHER

## 2021-12-23 NOTE — TELEPHONE ENCOUNTER
Writer prepared and signed script per Dr Emily Alejo. Patient is called and notified of results. Pharmacy is confirmed. All questions are answered.

## 2022-01-10 DIAGNOSIS — A04.8 H. PYLORI INFECTION: Primary | ICD-10-CM

## 2022-01-13 RX ORDER — AMOXICILLIN 500 MG/1
1000 TABLET, FILM COATED ORAL 2 TIMES DAILY
Qty: 56 TABLET | Refills: 0 | Status: SHIPPED | OUTPATIENT
Start: 2022-01-13 | End: 2022-01-27

## 2022-01-13 RX ORDER — CLARITHROMYCIN 500 MG/1
500 TABLET, COATED ORAL 2 TIMES DAILY
Qty: 28 TABLET | Refills: 0 | Status: SHIPPED | OUTPATIENT
Start: 2022-01-13 | End: 2022-01-27

## 2022-01-13 RX ORDER — OMEPRAZOLE 20 MG/1
20 CAPSULE, DELAYED RELEASE ORAL 2 TIMES DAILY
Qty: 28 CAPSULE | Refills: 0 | Status: SHIPPED | OUTPATIENT
Start: 2022-01-13 | End: 2022-01-27

## 2022-01-13 NOTE — TELEPHONE ENCOUNTER
Sherrie House called stating that the patient is wanting his H. Pylori medication sent to [address] [place] [address] on [facility]. Never got picked up the first time. Has a lot of stomach pain. Also, asking what he can do in the meantime. Epic would not let me add the new pharmacy.

## 2022-01-13 NOTE — TELEPHONE ENCOUNTER
Writer was on the phone with the patient the same time Cornelius Garcia was talking to Tom. Patient stated that he never received the treatment therapy for the H Pylori back in December. Writer informed the patient that the therapy treatment will be reordered and to follow the directions on the bottles. Writer also informed the patient of the breathe test and that will need to be performed at the end of February to see if the H Pylori was cured. Patient verbalized understanding and thanked the writer. There is nothing else giving or directed for the patient to do at this time.

## 2022-03-25 ENCOUNTER — TELEPHONE (OUTPATIENT)
Dept: GASTROENTEROLOGY | Age: 32
End: 2022-03-25

## 2022-04-21 ENCOUNTER — HOSPITAL ENCOUNTER (EMERGENCY)
Age: 32
Discharge: HOME | End: 2022-04-21
Attending: EMERGENCY MEDICINE
Payer: MEDICARE

## 2022-04-21 VITALS
DIASTOLIC BLOOD PRESSURE: 108 MMHG | HEART RATE: 99 BPM | OXYGEN SATURATION: 95 % | WEIGHT: 180 LBS | SYSTOLIC BLOOD PRESSURE: 147 MMHG | TEMPERATURE: 99.1 F | HEIGHT: 68 IN | RESPIRATION RATE: 18 BRPM | BODY MASS INDEX: 27.28 KG/M2

## 2022-04-21 DIAGNOSIS — S61.211A LACERATION OF LEFT INDEX FINGER WITHOUT FOREIGN BODY WITHOUT DAMAGE TO NAIL, INITIAL ENCOUNTER: Primary | ICD-10-CM

## 2022-04-21 DIAGNOSIS — S61.213A LACERATION OF LEFT MIDDLE FINGER WITHOUT FOREIGN BODY WITHOUT DAMAGE TO NAIL, INITIAL ENCOUNTER: ICD-10-CM

## 2022-04-21 PROCEDURE — 90471 IMMUNIZATION ADMIN: CPT | Performed by: STUDENT IN AN ORGANIZED HEALTH CARE EDUCATION/TRAINING PROGRAM

## 2022-04-21 PROCEDURE — 6370000000 HC RX 637 (ALT 250 FOR IP): Performed by: STUDENT IN AN ORGANIZED HEALTH CARE EDUCATION/TRAINING PROGRAM

## 2022-04-21 PROCEDURE — 90715 TDAP VACCINE 7 YRS/> IM: CPT | Performed by: STUDENT IN AN ORGANIZED HEALTH CARE EDUCATION/TRAINING PROGRAM

## 2022-04-21 PROCEDURE — 99284 EMERGENCY DEPT VISIT MOD MDM: CPT

## 2022-04-21 PROCEDURE — 6360000002 HC RX W HCPCS: Performed by: STUDENT IN AN ORGANIZED HEALTH CARE EDUCATION/TRAINING PROGRAM

## 2022-04-21 RX ORDER — CEPHALEXIN 500 MG/1
500 CAPSULE ORAL ONCE
Status: COMPLETED | OUTPATIENT
Start: 2022-04-21 | End: 2022-04-21

## 2022-04-21 RX ORDER — AMOXICILLIN AND CLAVULANATE POTASSIUM 875; 125 MG/1; MG/1
1 TABLET, FILM COATED ORAL 2 TIMES DAILY
Qty: 20 TABLET | Refills: 0 | Status: SHIPPED | OUTPATIENT
Start: 2022-04-21 | End: 2022-05-01

## 2022-04-21 RX ADMIN — TETANUS TOXOID, REDUCED DIPHTHERIA TOXOID AND ACELLULAR PERTUSSIS VACCINE, ADSORBED 0.5 ML: 5; 2.5; 8; 8; 2.5 SUSPENSION INTRAMUSCULAR at 01:29

## 2022-04-21 RX ADMIN — CEPHALEXIN 500 MG: 500 CAPSULE ORAL at 01:28

## 2022-04-21 ASSESSMENT — PAIN - FUNCTIONAL ASSESSMENT: PAIN_FUNCTIONAL_ASSESSMENT: 0-10

## 2022-04-21 ASSESSMENT — PAIN SCALES - GENERAL: PAINLEVEL_OUTOF10: 8
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
## 2022-04-21 NOTE — ED PROVIDER NOTES
[facility]     Emergency Department     Faculty Attestation    I performed a history and physical examination of the patient and discussed management with the resident. I reviewed the resident´s note and agree with the documented findings and plan of care. Any areas of disagreement are noted on the chart. I was personally present for the key portions of any procedures. I have documented in the chart those procedures where I was not present during the key portions. I have reviewed the emergency nurses triage note. I agree with the chief complaint, past medical history, past surgical history, allergies, medications, social and family history as documented unless otherwise noted below. For Physician Assistant/ Nurse Practitioner cases/documentation I have personally evaluated this patient and have completed at least one if not all key elements of the E/M (history, physical exam, and MDM). Additional findings are as noted. Patient right-hand-dominant, laceration to the palmar surface of the left index and middle fingers, flexion normal, tetanus needs updated.      Ave Wood MD  04/21/22 7743

## 2022-04-22 ENCOUNTER — TELEPHONE (OUTPATIENT)
Dept: FAMILY MEDICINE CLINIC | Age: 32
End: 2022-04-22

## 2022-04-22 NOTE — TELEPHONE ENCOUNTER
[facility] ([facility]) ED Follow up Call  Called to do a  ED follow up but np answer and no vm is set up   Reason for ED visit:    4/22/2022

## 2022-04-25 ASSESSMENT — ENCOUNTER SYMPTOMS
SHORTNESS OF BREATH: 0
VOMITING: 0
NAUSEA: 0
BACK PAIN: 0
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
## 2022-09-16 ENCOUNTER — TELEPHONE (OUTPATIENT)
Dept: PODIATRY | Age: 32
End: 2022-09-16

## 2023-05-04 NOTE — OP NOTE
PROCEDURE NOTE    DATE OF PROCEDURE: 12/9/2021     SURGEON: Shanika Cha MD  Facility: [facility]  ASSISTANT: None  Anesthesia: MAC  PREOPERATIVE DIAGNOSIS:   Abdominal pain    Diagnosis:  Significant duodenitis with tiny little ulcers    Gastritis biopsies were taken    Irregular Z-line biopsies were taken            POSTOPERATIVE DIAGNOSIS: As described below    OPERATION: Upper GI endoscopy with Biopsy    ANESTHESIA: Moderate Sedation     ESTIMATED BLOOD LOSS: Less than 50 ml    COMPLICATIONS: None. SPECIMENS:  Was Obtained:     Significant duodenitis with tiny little ulcers    Gastritis biopsies were taken    Irregular Z-line biopsies were taken    HISTORY: The patient is a 33y.o. year old male with history of above preop diagnosis. I recommended esophagogastroduodenoscopy with possible biopsy and I explained the risk, benefits, expected outcome, and alternatives to the procedure. Risks included but are not limited to bleeding, infection, respiratory distress, hypotension, and perforation of the esophagus, stomach, or duodenum. Patient understands and is in agreement. The patient was counseled at length about the risks of pedro Covid-19 during their perioperative period and any recovery window from their procedure. The patient was made aware that pedro Covid-19  may worsen their prognosis for recovering from their procedure  and lend to a higher morbidity and/or mortality risk. All material risks, benefits, and reasonable alternatives including postponing the procedure were discussed. The patient does wish to proceed with the procedure at this time. PROCEDURE: The patient was given IV conscious sedation. The patient's SPO2 remained above 90% throughout the procedure. The gastroscope was inserted orally and advanced under direct vision through the esophagus, through the stomach, through the pylorus, and into the descending duodenum. Post sedation note : The What Type Of Note Output Would You Prefer (Optional)?: Standard Output How Severe Is Your Acne?: mild patient's SPO2 remained above 90% throughout the procedure. the vital signs remained stable , and no immediate complication form the procedure noted, patient will be ready for d/c when criteria is met . Findings:    Retropharyngeal area was grossly normal appearing    Esophagus: abnormal:     Irregular Z-line biopsies were taken    Stomach:    Fundus: abnormal: Gastritis biopsies were taken    Body: abnormal: Gastritis biopsies were taken    Antrum: abnormal: Gastritis biopsies were taken    Duodenum:     Descending: abnormal: Significant duodenitis with tiny little ulcers    Bulb: abnormal: Significant duodenitis with tiny little ulcers    The scope was removed and the patient tolerated the procedure well. Recommendations/Plan:   1. F/U Biopsies  2. F/U In Office in 3-4 weeks  3.  Discussed with the family    Electronically signed by Army Angus MD  on 12/9/2021 at 9:37 AM Is This A New Presentation, Or A Follow-Up?: Acne

## 2024-06-20 ENCOUNTER — HOSPITAL ENCOUNTER (EMERGENCY)
Age: 34
Discharge: HOME | End: 2024-06-20
Attending: EMERGENCY MEDICINE

## 2024-06-20 VITALS
RESPIRATION RATE: 16 BRPM | HEART RATE: 84 BPM | OXYGEN SATURATION: 99 % | TEMPERATURE: 98.2 F | SYSTOLIC BLOOD PRESSURE: 142 MMHG | DIASTOLIC BLOOD PRESSURE: 80 MMHG

## 2024-06-20 DIAGNOSIS — Z11.3 SCREENING FOR STD (SEXUALLY TRANSMITTED DISEASE): Primary | ICD-10-CM

## 2024-06-20 PROCEDURE — 87661 TRICHOMONAS VAGINALIS AMPLIF: CPT

## 2024-06-20 PROCEDURE — 87491 CHLMYD TRACH DNA AMP PROBE: CPT

## 2024-06-20 PROCEDURE — 87591 N.GONORRHOEAE DNA AMP PROB: CPT

## 2024-06-20 PROCEDURE — 99283 EMERGENCY DEPT VISIT LOW MDM: CPT

## 2024-06-20 NOTE — ED PROVIDER NOTES
[facility] ED     Emergency Department     Faculty Attestation        I performed a history and physical examination of the patient and discussed management with the resident. I reviewed the resident’s note and agree with the documented findings and plan of care. Any areas of disagreement are noted on the chart. I was personally present for the key portions of any procedures. I have documented in the chart those procedures where I was not present during the key portions. I have reviewed the emergency nurses triage note. I agree with the chief complaint, past medical history, past surgical history, allergies, medications, social and family history as documented unless otherwise noted below.  For Physician Assistant/ Nurse Practitioner cases/documentation I have personally evaluated this patient and have completed at least one if not all key elements of the E/M (history, physical exam, and MDM). Additional findings are as noted.      Vital Signs: BP: (!) 142/80  Pulse: 84  Respirations: 16  Temp: 98.2 °F (36.8 °C) SpO2: 99 %  PCP:  Miriam Raymundo MD  Note Started: 6/20/24, 11:48 AM EDT    Pertinent Comments:         Critical Care  None      (Please note that portions of this note were completed with a voice recognition program. Efforts were made to edit the dictations but occasionally words are mis-transcribed. Whenever words are used in this note in any gender, they shall be construed as though they were used in the gender appropriate to the circumstances; and whenever words are used in this note in the singular or plural form, they shall be construed as though they were used in the form appropriate to the circumstances.)    Urban Shah MD [facility]  Attending Emergency Medicine Physician           Urban Shah MD  06/20/24 [id number]

## 2024-06-20 NOTE — PROGRESS NOTES
I signed up for this patient in error. I did not see this patient. I did not participate in the evlauation or treatment of this patient.    Electronically signed by Rajesh Baeza DO on 6/20/2024 at 1:37 PM

## 2024-06-20 NOTE — DISCHARGE INSTRUCTIONS
You came in for the STD check. [facility]. If positive, please call your PCP for the treatment or come to the Emergency department.    If you begin to experience any symptoms such as chest pain shortness of breath nausea vomiting dizziness drowsiness abdominal pain loss of consciousness or any other symptoms you find concerning please come to the ED for follow-up evaluation.    If you have been given medication please take them as prescribed. Do not take more medication than recommended at any given time.     Please follow-up with your primary care provider within 5 to 7 days for continued care.     Please feel free return to the hospital if your symptoms worsen or any new concerning symptoms develop.  Follow-up with your primary care physician as needed for all other the concerns.
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
## 2024-06-21 LAB
CHLAMYDIA DNA UR QL NAA+PROBE: NEGATIVE
N GONORRHOEA DNA UR QL NAA+PROBE: NEGATIVE
SOURCE: ABNORMAL
SPECIMEN DESCRIPTION: NORMAL
TRICHOMONAS VAGINALI, MOLECULAR: POSITIVE

## 2025-05-14 ENCOUNTER — TELEPHONE (OUTPATIENT)
Dept: PODIATRY | Age: 35
End: 2025-05-14

## 2025-05-14 NOTE — TELEPHONE ENCOUNTER
Patient last seen in 2021. Believes he has developed NSDS foot(nerve pain) and would like to get in asap sooner than August  Please return call  Thank you

## (undated) DEVICE — PREP-RESISTANT MARKER W/ RULER: Brand: MEDLINE INDUSTRIES, INC.

## (undated) DEVICE — GLOVE SURG SZ 7 L12IN FNGR THK79MIL GRN LTX FREE

## (undated) DEVICE — SUTURE FIBERWIRE SZ 2 W/ TAPERED NEEDLE BLUE L38IN NONABSORB BLU L26.5MM 1/2 CIRCLE AR7200

## (undated) DEVICE — DEFENDO AIR WATER SUCTION AND BIOPSY VALVE KIT FOR  OLYMPUS: Brand: DEFENDO AIR/WATER/SUCTION AND BIOPSY VALVE

## (undated) DEVICE — STANDARD HYPODERMIC NEEDLE,POLYPROPYLENE HUB: Brand: MONOJECT

## (undated) DEVICE — SUPER TURBOVAC 90 INTEGRATED CABLE WAND ICW: Brand: COBLATION

## (undated) DEVICE — INTENDED FOR TISSUE SEPARATION, AND OTHER PROCEDURES THAT REQUIRE A SHARP SURGICAL BLADE TO PUNCTURE OR CUT.: Brand: BARD-PARKER ® CARBON RIB-BACK BLADES

## (undated) DEVICE — PAD COOL W10.9XL11.3IN UNIV REG HOSE WRP ON THER CLD

## (undated) DEVICE — SOLUTION IV IRRIG 500ML 0.9% SODIUM CHL 2F7123

## (undated) DEVICE — SUTURE FIBERLOOP SZ 2-0 L20IN NONABSORBABLE BLU STR NDL AR7234

## (undated) DEVICE — PADDING CAST W4INXL4YD HIGHLY ABSRB THAN COT EZ APPL

## (undated) DEVICE — KIT DRP 4 ARM ACC DISP DA VINCI SI ENDOWRIST

## (undated) DEVICE — BANDAGE GZ W2XL75IN ST RAYON POLY CNFRM STRTCH LTWT

## (undated) DEVICE — DRESSING PETRO W3XL8IN OIL EMUL N ADH GZ KNIT IMPREG CELOS

## (undated) DEVICE — K-WIRE
Type: IMPLANTABLE DEVICE | Site: FOOT | Status: NON-FUNCTIONAL
Brand: ASNIS
Removed: 2019-05-16

## (undated) DEVICE — TOWEL,OR,DSP,ST,BLUE,STD,4/PK,20PK/CS: Brand: MEDLINE

## (undated) DEVICE — SKIN PREP TRAY W/CHG: Brand: MEDLINE INDUSTRIES, INC.

## (undated) DEVICE — BANDAGE COMPR W3INXL5YD WHT BGE POLY COT M E WRP WV HK AND

## (undated) DEVICE — BITEBLOCK 54FR W/ DENT RIM BLOX

## (undated) DEVICE — WAX SURG 2.5GM HEMSTAT BNE BEESWAX PARAFFIN ISO PALMITATE

## (undated) DEVICE — PENCIL ES L3M BTTN SWCH HOLSTER W/ BLDE ELECTRD EDGE

## (undated) DEVICE — SUTURE FIBERSNARE 2 L26IN NONABSORBABLE GRN L12IN FIBERWIRE AR7209SN

## (undated) DEVICE — GARMENT,MEDLINE,DVT,INT,CALF,MED, GEN2: Brand: MEDLINE

## (undated) DEVICE — [AGGRESSIVE PLUS CUTTER, ARTHROSCOPIC SHAVER BLADE,  DO NOT RESTERILIZE,  DO NOT USE IF PACKAGE IS DAMAGED,  KEEP DRY,  KEEP AWAY FROM SUNLIGHT]: Brand: FORMULA

## (undated) DEVICE — BANDAGE COMPR W6INXL5YD WHT BGE POLY COT M E WRP WV HK AND

## (undated) DEVICE — Device

## (undated) DEVICE — GLOVE SURG SZ 65 L12IN FNGR THK87MIL WHT LTX FREE

## (undated) DEVICE — Z DUP USE 2650846 BRACE KNEE UNIV L46 65CM THGH 76CM LTWT EZ TO USE HNG EZ TO

## (undated) DEVICE — GLOVE SURG SZ 75 L12IN FNGR THK87MIL WHT LTX FREE

## (undated) DEVICE — SYRINGE, LUER LOCK, 10ML: Brand: MEDLINE

## (undated) DEVICE — BANDAGE,GAUZE,BULKEE II,4.5"X4.1YD,STRL: Brand: MEDLINE

## (undated) DEVICE — SUTURE ETHLN 5-0 L18IN NONABSORBABLE BLK PS-3 L16MM 3/8 CIR 1668H

## (undated) DEVICE — SURGICAL PROCEDURE KIT BASIN MAJ SET UP

## (undated) DEVICE — HOOK LOCK LATEX FREE ELASTIC BANDAGE 3INX5YD

## (undated) DEVICE — DUP USE 275908 BLADE SAW MICRO 25X5.5X 38MM OSCIL SAG

## (undated) DEVICE — GLOVE SURG SZ 65 L12IN FNGR THK79MIL GRN LTX FREE

## (undated) DEVICE — APPLICATOR MEDICATED 26 CC SOLUTION HI LT ORNG CHLORAPREP

## (undated) DEVICE — STOCKINETTE,SINGLE PLY,4X48,STERILE: Brand: MEDLINE

## (undated) DEVICE — DRAPE SURGICAL HAND PROX AURORA

## (undated) DEVICE — 3M™ WARMING BLANKET, LOWER BODY, 10 PER CASE, 42568: Brand: BAIR HUGGER™

## (undated) DEVICE — FORCEPS BX L240CM JAW DIA2.8MM L CAP W/ NDL MIC MESH TOOTH

## (undated) DEVICE — TUBING PMP L16FT MAIN DISP FOR AR-6400 AR-6475

## (undated) DEVICE — COVER,MAYO STAND,XL,STERILE: Brand: MEDLINE

## (undated) DEVICE — BNDG,ELSTC,MATRIX,STRL,6"X5YD,LF,HOOK&LP: Brand: MEDLINE

## (undated) DEVICE — DRESSING PETRO W3XL3IN OIL EMUL N ADH GZ KNIT IMPREG CELOS

## (undated) DEVICE — FAST-FIX 360 STRAIGHT KNOT                                    PUSHER/CUTTER AND SLOTTED CANNULA SETS: Brand: FAST-FIX

## (undated) DEVICE — Z DISCONTINUED USE 2271144 DRAIN SURG W0.25XL18IN PRECUT UNIF WALL THICKNESS PENROSE

## (undated) DEVICE — GAUZE,SPONGE,FLUFF,6"X6.75",STRL,5/TRAY: Brand: MEDLINE

## (undated) DEVICE — COUNTERSINK SURG DIA2MM CANN ADD ON CPL DISP FOR ASNS MIC

## (undated) DEVICE — BANDAGE COMPR W4INXL5YD WHT BGE POLY COT M E WRP WV HK AND

## (undated) DEVICE — GOWN,AURORA,NON-REINFORCED,2XL: Brand: MEDLINE

## (undated) DEVICE — SINGLE PORT MANIFOLD: Brand: NEPTUNE 2

## (undated) DEVICE — DISC SUCT FLR DLX QUICKSUITE

## (undated) DEVICE — LARGE TEAR CROSS CUT RASP, 7MM X 14MM: Brand: MICROAIRE®

## (undated) DEVICE — PADDING UNDERCAST W3INXL4YD PURE COT FBR LO LINTING WYTEX

## (undated) DEVICE — STRIP,CLOSURE,WOUND,MEDI-STRIP,1/2X4: Brand: MEDLINE

## (undated) DEVICE — 3M™ STERI-STRIP™ REINFORCED ADHESIVE SKIN CLOSURES, R1547, 1/2 IN X 4 IN (12 MM X 100 MM), 6 STRIPS/ENVELOPE: Brand: 3M™ STERI-STRIP™

## (undated) DEVICE — YANKAUER,FLEXIBLE HANDLE,REGLR CAPACITY: Brand: MEDLINE INDUSTRIES, INC.

## (undated) DEVICE — BIT DRL DIA2.1MM AO CPL CANN DISP FOR 3MM SCR ASNS MIC SYS

## (undated) DEVICE — CHLORAPREP 26ML ORANGE

## (undated) DEVICE — [STANDARD 12-FLUTE BARREL BUR, ARTHROSCOPIC SHAVER BLADE,  DO NOT RESTERILIZE,  DO NOT USE IF PACKAGE IS DAMAGED,  KEEP DRY,  KEEP AWAY FROM SUNLIGHT]: Brand: FORMULA

## (undated) DEVICE — SST TWIST DRILL, AO TYPE, 2MM DIA. X 75MM: Brand: MICROAIRE®

## (undated) DEVICE — SUTURE VCRL + SZ 3-0 L27IN ABSRB WHT FS-1 3/8 CIR REV CUT VCP442H

## (undated) DEVICE — SUTURE MCRYL SZ 3-0 L27IN ABSRB UD PS-2 3/8 CIR REV CUT NDL MCP427H

## (undated) DEVICE — PADDING UNDERCAST W4INXL4YD COT FBR LO LINTING WYTEX

## (undated) DEVICE — THIN OFFSET (9.0 X 0.38 X 25.0MM)

## (undated) DEVICE — Z INACTIVE USE 2660664 SOLUTION IRRIG 3000ML 0.9% SOD CHL USP UROMATIC PLAS CONT

## (undated) DEVICE — SUTURE ETHLN SZ 3-0 L18IN NONABSORBABLE BLK L24MM PS-1 3/8 1663G

## (undated) DEVICE — COVER,TABLE,HEAVY DUTY,50"X90",STRL: Brand: MEDLINE

## (undated) DEVICE — BANDAGE,ELASTIC,ESMARK,STERILE,4"X9',LF: Brand: MEDLINE

## (undated) DEVICE — COLLECTOR TISS AUTOLGS

## (undated) DEVICE — BLADE SURG L10MM PARA GRFT KNF FOR ACL/PCL RECON

## (undated) DEVICE — NO USE 18 MONTHS GOWN STD LG  1153D

## (undated) DEVICE — GOWN SURGICALXL REUSE REPROC

## (undated) DEVICE — UNTHREADED GUIDE WIRE
Type: IMPLANTABLE DEVICE | Site: FOOT | Status: NON-FUNCTIONAL
Brand: FIXOS
Removed: 2019-05-16

## (undated) DEVICE — SUTURE VCRL SZ 0 L36IN ABSRB UD L36MM CT-1 1/2 CIR J946H

## (undated) DEVICE — CAP PROTCT GRN REFIL FOR 0.054-0.062IN WIRE W SER PIN BALL

## (undated) DEVICE — SOLUTION IV IRRIG WATER 1000ML POUR BRL 2F7114

## (undated) DEVICE — SUTURE MCRYL SZ 4-0 L27IN ABSRB UD L19MM PS-2 1/2 CIR PRIM Y426H

## (undated) DEVICE — KIT INSTR TRANSTIBIAL CRUCE W/O SAW BLDE DISP

## (undated) DEVICE — SMALL OSC. SAW BLADE, 5.5MM X 18.5MM X 0.38MM: Brand: MICROAIRE®

## (undated) DEVICE — MASTISOL ADHESIVE LIQ 2/3ML

## (undated) DEVICE — BIT DRL DIA17MM AO CPL DISPOSABLE FOR ASNS MIC CANN SCR SYS

## (undated) DEVICE — SYRINGE IRRIG 60ML SFT PLIABLE BLB EZ TO GRP 1 HND USE W/

## (undated) DEVICE — GLOVE SURG SZ 85 L12IN FNGR THK13MIL WHT ISOLEX POLYISOPRENE

## (undated) DEVICE — ENDO KIT W/SYRINGE: Brand: MEDLINE INDUSTRIES, INC.

## (undated) DEVICE — BLANKET WRM W29.9XL79.1IN UP BODY FORC AIR MISTRAL-AIR

## (undated) DEVICE — GLOVE SURG SZ 75 STD WHT LTX SYN POLYMER BEAD REINF ANTI RL

## (undated) DEVICE — SOLUTION IV IRRIG POUR BRL 0.9% SODIUM CHL 2F7124

## (undated) DEVICE — DRAPE C ARM UNIV W41XL74IN CLR PLAS XR VELC CLSR POLY STRP

## (undated) DEVICE — ADHESIVE SKIN CLSR 0.7ML TOP DERMBND ADV

## (undated) DEVICE — TUBING FLD MGMT Y DBL SPIK DUALWAVE

## (undated) DEVICE — ZIMMER® STERILE DISPOSABLE TOURNIQUET CUFF WITH PLC, DUAL PORT, SINGLE BLADDER, 18 IN. (46 CM)

## (undated) DEVICE — SUTURE VCRL SZ 2 L27IN ABSRB VLT L65MM TP-1 1/2 CIR J649G

## (undated) DEVICE — 3M™ WARMING BLANKET, UPPER BODY, 10 PER CASE, 42268: Brand: BAIR HUGGER™

## (undated) DEVICE — TUBING, SUCTION, 1/4" X 12', STRAIGHT: Brand: MEDLINE

## (undated) DEVICE — POUCH INSTR W6.75XL11.5IN FRST 2 PKT ADH FOR ORTH AND

## (undated) DEVICE — HYPODERMIC SAFETY NEEDLE: Brand: MAGELLAN

## (undated) DEVICE — SUTURE VCRL SZ 2-0 L27IN ABSRB UD L26MM CT-2 1/2 CIR J269H

## (undated) DEVICE — 3M™ STERI-STRIP™ COMPOUND BENZOIN TINCTURE 40 BAGS/CARTON 4 CARTONS/CASE C1544: Brand: 3M™ STERI-STRIP™

## (undated) DEVICE — BLADE SURG NO15 S STL STR DISP GLASSVAN

## (undated) DEVICE — 9165 UNIVERSAL PATIENT PLATE: Brand: 3M™

## (undated) DEVICE — ZIMMER® A.T.S. CYLINDRICAL TOURNIQUET CUFF, DUAL PORT, SINGLE BLADDER 18 IN. (46 CM)

## (undated) DEVICE — TOURNIQUET CUF BLD PRESSURE 4X18 IN 2 PRT SINGLE BLDR RED

## (undated) DEVICE — SUTURE FIBERWIRE FIBERSTICK SZ 2-0 L50/12IN NONABSORBABLE AR7209

## (undated) DEVICE — GOWN,SIRUS,NON REINFRCD,LARGE,SET IN SL: Brand: MEDLINE

## (undated) DEVICE — SUTURE VCRL + SZ 4-0 L18IN ABSRB UD L19MM PS-2 3/8 CIR PRIM VCP496H

## (undated) DEVICE — OSCILLATING SAW BLADE, 9MM X 24.6MM X 0.64MM: Brand: MICROAIRE®

## (undated) DEVICE — COUNTERSINK SURG DIA3MM CANN ADD ON CPL DISP FOR ASNS MIC

## (undated) DEVICE — DRILL SURG FLIPCUTTER III

## (undated) DEVICE — SUTURE MCRYL + SZ 4-0 L27IN ABSRB UD L19MM PS-2 3/8 CIR MCP426H

## (undated) DEVICE — SUTURE VCRL + SZ 3-0 L18IN ABSRB VLT FS-2 L19MM 3/8 CIR REV VCP393H

## (undated) DEVICE — GOWN,AURORA,NONRNF,XL,30/CS: Brand: MEDLINE